# Patient Record
Sex: FEMALE | Race: WHITE | NOT HISPANIC OR LATINO | Employment: PART TIME | URBAN - METROPOLITAN AREA
[De-identification: names, ages, dates, MRNs, and addresses within clinical notes are randomized per-mention and may not be internally consistent; named-entity substitution may affect disease eponyms.]

---

## 2017-01-09 ENCOUNTER — ALLSCRIPTS OFFICE VISIT (OUTPATIENT)
Dept: OTHER | Facility: OTHER | Age: 52
End: 2017-01-09

## 2017-02-01 ENCOUNTER — ALLSCRIPTS OFFICE VISIT (OUTPATIENT)
Dept: OTHER | Facility: OTHER | Age: 52
End: 2017-02-01

## 2017-02-13 ENCOUNTER — GENERIC CONVERSION - ENCOUNTER (OUTPATIENT)
Dept: OTHER | Facility: OTHER | Age: 52
End: 2017-02-13

## 2017-02-14 LAB — GLUCOSE SERPL-MCNC: 100 MG/DL

## 2017-02-20 ENCOUNTER — GENERIC CONVERSION - ENCOUNTER (OUTPATIENT)
Dept: OTHER | Facility: OTHER | Age: 52
End: 2017-02-20

## 2017-03-02 ENCOUNTER — ALLSCRIPTS OFFICE VISIT (OUTPATIENT)
Dept: OTHER | Facility: OTHER | Age: 52
End: 2017-03-02

## 2017-03-02 DIAGNOSIS — N63.0 BREAST LUMP: ICD-10-CM

## 2017-03-10 ENCOUNTER — HOSPITAL ENCOUNTER (OUTPATIENT)
Dept: RADIOLOGY | Facility: HOSPITAL | Age: 52
Discharge: HOME | End: 2017-03-10
Payer: COMMERCIAL

## 2017-03-10 DIAGNOSIS — N63.0 BREAST LUMP: ICD-10-CM

## 2017-03-10 PROCEDURE — 76642 ULTRASOUND BREAST LIMITED: CPT

## 2017-03-10 PROCEDURE — G0204 DX MAMMO INCL CAD BI: HCPCS

## 2017-03-13 ENCOUNTER — GENERIC CONVERSION - ENCOUNTER (OUTPATIENT)
Dept: OTHER | Facility: OTHER | Age: 52
End: 2017-03-13

## 2017-04-18 ENCOUNTER — GENERIC CONVERSION - ENCOUNTER (OUTPATIENT)
Dept: OTHER | Facility: OTHER | Age: 52
End: 2017-04-18

## 2017-04-19 ENCOUNTER — GENERIC CONVERSION - ENCOUNTER (OUTPATIENT)
Dept: OTHER | Facility: OTHER | Age: 52
End: 2017-04-19

## 2017-04-19 LAB
A/G RATIO (HISTORICAL): 1.5 (ref 1.2–2.2)
ALBUMIN SERPL BCP-MCNC: 4.4 G/DL (ref 3.5–5.5)
ALP SERPL-CCNC: 92 IU/L (ref 39–117)
ALT SERPL W P-5'-P-CCNC: 13 IU/L (ref 0–32)
AST SERPL W P-5'-P-CCNC: 15 IU/L (ref 0–40)
BILIRUB SERPL-MCNC: 0.3 MG/DL (ref 0–1.2)
BUN SERPL-MCNC: 18 MG/DL (ref 6–24)
BUN/CREA RATIO (HISTORICAL): 16 (ref 9–23)
CALCIUM SERPL-MCNC: 9.2 MG/DL (ref 8.7–10.2)
CHLORIDE SERPL-SCNC: 103 MMOL/L (ref 96–106)
CO2 SERPL-SCNC: 24 MMOL/L (ref 18–29)
CREAT SERPL-MCNC: 1.13 MG/DL (ref 0.57–1)
EGFR AFRICAN AMERICAN (HISTORICAL): 65 ML/MIN/1.73
EGFR-AMERICAN CALC (HISTORICAL): 56 ML/MIN/1.73
GLUCOSE SERPL-MCNC: 94 MG/DL (ref 65–99)
INTERPRETATION (HISTORICAL): NORMAL
PDF IMAGE (HISTORICAL): NORMAL
POTASSIUM SERPL-SCNC: 4.8 MMOL/L (ref 3.5–5.2)
SODIUM SERPL-SCNC: 141 MMOL/L (ref 134–144)
TOT. GLOBULIN, SERUM (HISTORICAL): 3 G/DL (ref 1.5–4.5)
TOTAL PROTEIN (HISTORICAL): 7.4 G/DL (ref 6–8.5)

## 2017-04-26 ENCOUNTER — GENERIC CONVERSION - ENCOUNTER (OUTPATIENT)
Dept: OTHER | Facility: OTHER | Age: 52
End: 2017-04-26

## 2017-05-05 ENCOUNTER — ALLSCRIPTS OFFICE VISIT (OUTPATIENT)
Dept: OTHER | Facility: OTHER | Age: 52
End: 2017-05-05

## 2017-08-25 ENCOUNTER — GENERIC CONVERSION - ENCOUNTER (OUTPATIENT)
Dept: OTHER | Facility: OTHER | Age: 52
End: 2017-08-25

## 2017-10-13 ENCOUNTER — ALLSCRIPTS OFFICE VISIT (OUTPATIENT)
Dept: OTHER | Facility: OTHER | Age: 52
End: 2017-10-13

## 2017-10-14 NOTE — PROGRESS NOTES
Assessment  1  Painful ankle (719 47) (M25 579)   2  Difficulty walking (719 7) (R26 2)   3  Foot pain, bilateral (729 5) (M79 671,M79 672)    Plan   · Voltaren 1 % Transdermal Gel (Diclofenac Sodium); APPLY TO LOWER  EXTREMITIES, 4 GM OF GEL TO AFFECTED AREA 4 TIMES DAILY  DO  NOT APPLY MORE THAN 16 GM DAILY TO ANY ONE AFFECTED JOINT   · Follow-up PRN Evaluation and Treatment  Follow-up  Status: Complete  Done:  20XQY4847 09:32AM   · Schedule Surgery Treatment  Procedure  Status: Hold For - Scheduling  Requested for:  44IYI6274   · Use a heel insert in both shoes ; Status:Complete;   Done: 51UKG7307 09:32AM   · Wear shoes that give your toes plenty of room ; Status:Complete;   Done: 17MHB0096  09:32AM    Discussion/Summary  The patient was counseled regarding instructions for management,-- prognosis,-- patient and family education,-- risks and benefits of treatment options,-- importance of compliance with treatment  Patient is able to Self-Care  Possible side effects of new medications were reviewed with the patient/guardian today  The treatment plan was reviewed with the patient/guardian  The patient/guardian understands and agrees with the treatment plan      Chief Complaint  FOLLOW UP FROM LAST VISIT      History of Present Illness  HPI: Patient has pain in both feet  Patient is osteoarthritis and psoriatic arthritis  She has a painful right bunion  Her left instep hurts  Review of Systems    Constitutional: No fever, no chills, feels well, no tiredness, no recent weight gain or weight loss  Cardiovascular: No complaints of slow heart rate, no fast heart rate, no chest pain, no palpitations, no leg claudication, no lower extremity edema  Active Problems  1  Allergic rhinitis due to pollen (477 0) (J30 1)   2  Anxiety disorder (300 00) (F41 9)   3  Benign essential hypertension (401 1) (I10)   4  BMI 35 0-35 9,adult (V85 35) (Z68 35)   5   Breast lump on right side at 10 o'clock position (611 72) (N63 11)   6  Colon, diverticulosis (562 10) (K57 30)   7  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)   8  Impaired fasting glucose (790 21) (R73 01)   9  Irritable bowel syndrome (564 1) (K58 9)   10  Obesity (278 00) (E66 9)   11  Painful ankle (719 47) (M25 579)   12  Visit for screening mammogram (V76 12) (Z12 31)    Past Medical History   · History of Acute conjunctivitis of right eye (372 00) (H10 31)   · History of Acute maxillary sinusitis (461 0) (J01 00)   · History of Acute maxillary sinusitis, recurrence not specified (461 0) (J01 00)   · History of Acute URI (465 9) (J06 9)   · History of Herniated Intervertebral Disc (722 2)   · History of acute bronchitis (V12 69) (Z87 09)   · History of acute pharyngitis (V12 69) (Z87 09)   · History of dermatitis (V13 3) (Z87 2)   · Influenza vaccine needed (V04 81) (Z23)   · History of Otitis media, unspecified laterality   · History of Pre-op exam (V72 84) (Z01 818)   · History of Screening for diabetes mellitus (DM) (V77 1) (Z13 1)   · History of Urgency of urination (788 63) (R39 15)    Surgical History   · History of Appendectomy   · History of  Section   · History of Exostosectomy Foot For Spur   · History of Knee Surgery   · History of Tubal Ligation    Family History  Mother    · Family history of Diabetes Mellitus (V18 0)   · Family history of Heart Disease (V17 49)  Father    · Family history of Atrial Fibrillation    Social History   · Never a smoker    Current Meds   1  Aspirin Low Dose 81 MG TABS; TAKE 1 TABLET DAILY; Therapy: 17YAT2766 to (Evaluate:2013); Last Rx:2013 Ordered   2  ClonazePAM 0 5 MG Oral Tablet; 1/2 po Q6 hours prn anxiety; Therapy: 86SYC2862 to (Last Rx:51Nee5540)  Requested for: 2016 Ordered   3  Metaxalone 800 MG Oral Tablet; TAKE 1 TABLET 3 TIMES DAILY AS NEEDED; Therapy: 79HFI1828 to (Evaluate:93Jzu4190)  Requested for: 45FEJ0616; Last   Rx:2017 Ordered   4   Omeprazole 20 MG Oral Capsule Delayed Release; Therapy: 39EGP9410 to (Evaluate:24Ssz2716) Recorded   5  Sertraline HCl - 100 MG Oral Tablet; take 1 tablet by mouth once daily; Therapy: 03WXN1896 to (03 17 74 30 53)  Requested for: 97DYV9434; Last   Rx:28Oct2016 Ordered   6  Verapamil HCl  MG Oral Capsule Extended Release 24 Hour; take 1 capsule by   mouth once daily; Therapy: 28XZA1476 to (03 17 74 30 53)  Requested for: 70LXN3235; Last   Rx:28Oct2016 Ordered   7  VESIcare 10 MG Oral Tablet; TAKE 1 TABLET Bedtime; Therapy: 43JDT5692 to (03 17 74 30 53)  Requested for: 67LXK4405; Last   Rx:28Oct2016 Ordered    Allergies  1  Penicillins  2  Bee sting    Vitals   Recorded: 08ORS6283 56:42LX   Systolic 087   Diastolic 96   Height 5 ft 5 5 in   Weight 218 lb    BMI Calculated 35 73   BSA Calculated 2 06     Physical Exam  Left Foot: Appearance: Normal except as noted:   Tenderness: None except the proximal first metatarsal,-- Heel cuneiform,-- extensor digitorum tendon,-- flexor hallucis longus tendon,-- lisfranc joint-- and-- medial longitudinal arch  ROM: subtalar motion was restricted  Right Foot: Appearance: Normal except as noted: pes planus  Great toe deformities include a bunion  Hallux rigidus noted, with dorsal spurring of the first metatarsophalangeal joint  Tenderness: None except the great toe,-- distal first metatarsal-- and-- medial forefoot  Left Ankle: ROM: limited ROM in all planes   Right Ankle: ROM: limited ROM in all planes   Neurological Exam: performed  Light touch was intact bilaterally  Vibratory sensation was intact bilaterally  Response to monofilament test was intact bilaterally  Deep tendon reflexes: patellar reflex present bilaterally-- and-- achilles reflex present bilaterally  Vascular Exam: performed Dorsalis pedis pulses were present bilaterally  Posterior tibial pulses were present bilaterally  Elevation Pallor: absent bilaterally  Dependence rubor was absent bilaterally   Edema: severe bilaterally-- and-- 5/7 pitting edema bilateral  Negative Homans sign  Toenails: All of the toenails were elongated  Hyperkeratosis: none  Shoe Gear Evaluation: performed ()  Recommendation(s): custom inlays  Results/Data  I personally reviewed the films/images/results in the office today  My interpretation follows  X-ray Review 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 noted  Osteoarthritis Lisfranc joint bilateral noted as well  Procedure  Patient will do as directed and wear the orthotics daily  Trigger point injection was done into the left foot  Through a dorsal approach in the area of the first metatarsophalangeal joint, 1 cc of Kenalog-10 was injected without complication  Patient is been referred back to rheumatology   We will add Voltaren gel      Future Appointments    Date/Time Provider Specialty Site   11/07/2017 02:45 PM Marli Tian, [address] [place] [address]     Signatures   Electronically signed by : Oziel Vicente DPM; Oct 13 2017  9:32AM EST                       (Author)

## 2017-10-18 ENCOUNTER — GENERIC CONVERSION - ENCOUNTER (OUTPATIENT)
Dept: OTHER | Facility: OTHER | Age: 52
End: 2017-10-18

## 2017-10-18 LAB
A/G RATIO (HISTORICAL): 1.3 (ref 1.2–2.2)
ALBUMIN SERPL BCP-MCNC: 4.3 G/DL (ref 3.5–5.5)
ALP SERPL-CCNC: 100 IU/L (ref 39–117)
ALT SERPL W P-5'-P-CCNC: 12 IU/L (ref 0–32)
AST SERPL W P-5'-P-CCNC: 14 IU/L (ref 0–40)
BILIRUB SERPL-MCNC: 0.4 MG/DL (ref 0–1.2)
BUN SERPL-MCNC: 20 MG/DL (ref 6–24)
BUN/CREA RATIO (HISTORICAL): 20 (ref 9–23)
CALCIUM SERPL-MCNC: 9.4 MG/DL (ref 8.7–10.2)
CHLORIDE SERPL-SCNC: 99 MMOL/L (ref 96–106)
CHOLEST SERPL-MCNC: 169 MG/DL (ref 100–199)
CO2 SERPL-SCNC: 23 MMOL/L (ref 18–29)
CREAT SERPL-MCNC: 1.01 MG/DL (ref 0.57–1)
EGFR AFRICAN AMERICAN (HISTORICAL): 74 ML/MIN/1.73
EGFR-AMERICAN CALC (HISTORICAL): 64 ML/MIN/1.73
GLUCOSE SERPL-MCNC: 86 MG/DL (ref 65–99)
HDLC SERPL-MCNC: 51 MG/DL
LDLC SERPL CALC-MCNC: 85 MG/DL (ref 0–99)
POTASSIUM SERPL-SCNC: 4.6 MMOL/L (ref 3.5–5.2)
SODIUM SERPL-SCNC: 137 MMOL/L (ref 134–144)
TOT. GLOBULIN, SERUM (HISTORICAL): 3.2 G/DL (ref 1.5–4.5)
TOTAL PROTEIN (HISTORICAL): 7.5 G/DL (ref 6–8.5)
TRIGL SERPL-MCNC: 166 MG/DL (ref 0–149)

## 2017-10-19 ENCOUNTER — GENERIC CONVERSION - ENCOUNTER (OUTPATIENT)
Dept: OTHER | Facility: OTHER | Age: 52
End: 2017-10-19

## 2017-10-19 LAB
INTERPRETATION (HISTORICAL): NORMAL
TSH SERPL DL<=0.05 MIU/L-ACNC: 3.33 UIU/ML (ref 0.45–4.5)

## 2017-10-23 ENCOUNTER — ALLSCRIPTS OFFICE VISIT (OUTPATIENT)
Dept: OTHER | Facility: OTHER | Age: 52
End: 2017-10-23

## 2017-10-26 ENCOUNTER — ALLSCRIPTS OFFICE VISIT (OUTPATIENT)
Dept: OTHER | Facility: OTHER | Age: 52
End: 2017-10-26

## 2017-10-27 NOTE — PROGRESS NOTES
Assessment  1  Other acute sinusitis (461 8) (J01 80)    Plan  Other acute sinusitis    · Azithromycin 250 MG Oral Tablet; TAKE 2 TABLETS ON DAY 1 THEN TAKE 1  TABLET A DAY FOR 4 DAYS   · MethylPREDNISolone 4 MG Oral Tablet Therapy Pack; as directed    Chief Complaint  head and chest congestion harsh occasional productive cough sinus pressure achy at times rmklpn      History of Present Illness  pt states she was here monday with congestion achyness cough  pt states she also had pink eye  pink eye was treatedstates her symptoms are getting worsefever no chills       Review of Systems    Constitutional: feeling poorly, but-- as noted in HPI  Eyes: No complaints of eye pain, no red eyes, no eyesight problems, no discharge, no dry eyes, no itching of eyes  ENT: nasal discharge-- and-- hoarseness, but-- as noted in HPI  Cardiovascular: No complaints of slow heart rate, no fast heart rate, no chest pain, no palpitations, no leg claudication, no lower extremity edema  Respiratory: cough, but-- as noted in HPI  Active Problems  1  Acute conjunctivitis of both eyes (372 00) (H10 33)   2  Adult BMI 36 0-36 9 kg/sq m (V85 36) (Z68 36)   3  Allergic rhinitis due to pollen (477 0) (J30 1)   4  Anxiety disorder (300 00) (F41 9)   5  Benign essential hypertension (401 1) (I10)   6  Breast lump on right side at 10 o'clock position (611 72) (N63 11)   7  Colon, diverticulosis (562 10) (K57 30)   8  Difficulty walking (719 7) (R26 2)   9  Foot pain, bilateral (729 5) (M79 671,M79 672)   10  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)   11  Impaired fasting glucose (790 21) (R73 01)   12  Irritable bowel syndrome (564 1) (K58 9)   13  Obesity (278 00) (E66 9)   14  Painful ankle (719 47) (M25 579)   15  Visit for screening mammogram (V76 12) (Z12 31)    Past Medical History  1  History of Acute conjunctivitis of right eye (372 00) (H10 31)   2  History of Acute maxillary sinusitis (461 0) (J01 00)   3   History of Acute maxillary sinusitis, recurrence not specified (461 0) (J01 00)   4  History of Acute URI (465 9) (J06 9)   5  History of Herniated Intervertebral Disc (722 2)   6  History of acute bronchitis (V12 69) (Z87 09)   7  History of acute pharyngitis (V12 69) (Z87 09)   8  History of dermatitis (V13 3) (Z87 2)   9  Influenza vaccine needed (V04 81) (Z23)   10  History of Otitis media, unspecified laterality   11  History of Pre-op exam (V72 84) (Z01 818)   12  History of Screening for diabetes mellitus (DM) (V77 1) (Z13 1)   13  History of Urgency of urination (788 63) (R39 15)    The active problems and past medical history were reviewed and updated today  Surgical History  1  History of Appendectomy   2  History of  Section   3  History of Exostosectomy Foot For Spur   4  History of Knee Surgery   5  History of Tubal Ligation    The surgical history was reviewed and updated today  Family History  Mother    1  Family history of Diabetes Mellitus (V18 0)   2  Family history of Heart Disease (V17 49)  Father    3  Family history of Atrial Fibrillation    The family history was reviewed and updated today  Social History   · Never a smoker  The social history was reviewed and updated today  Current Meds   1  Aspirin Low Dose 81 MG TABS; TAKE 1 TABLET DAILY; Therapy: 43YCR3784 to (Evaluate:2013); Last Rx:2013 Ordered   2  ClonazePAM 0 5 MG Oral Tablet; 1/2 po Q6 hours prn anxiety; Therapy: 19KFM4231 to (Last Rx:09Dwv2023)  Requested for: 2016 Ordered   3  Metaxalone 800 MG Oral Tablet; TAKE 1 TABLET 3 TIMES DAILY AS NEEDED; Therapy: 59WBY1939 to (Evaluate:71Vlq8926)  Requested for: 83OMB2229; Last   Rx:75Buh5937 Ordered   4  Omeprazole 20 MG Oral Capsule Delayed Release; Therapy: 79AGA5745 to (Evaluate:04Qva2578) Recorded   5  Sertraline HCl - 100 MG Oral Tablet; take 1 tablet by mouth once daily;    Therapy: 88QCZ7287 to ([phone])  Requested for: 52AMK7193; Last Rx: 28Oct2016 Ordered   6  Verapamil HCl  MG Oral Capsule Extended Release 24 Hour; take 1 capsule by   mouth once daily; Therapy: 52LEN5381 to [phone])  Requested for: 60NIU6971; Last   Rx:28Oct2016 Ordered   7  VESIcare 10 MG Oral Tablet; TAKE 1 TABLET Bedtime; Therapy: 91FNF9860 to [phone])  Requested for: 96VPU8861; Last   Rx:28Oct2016 Ordered   8  Voltaren 1 % Transdermal Gel; APPLY TO LOWER EXTREMITIES, 4 GM OF GEL TO   AFFECTED AREA 4 TIMES DAILY  DO NOT APPLY MORE THAN 16 GM DAILY TO ANY   ONE AFFECTED JOINT; Therapy: 38VCB6944 to (Evaluate:63Rlw2014)  Requested for: 49UDC6524; Last   Rx:13Oct2017 Ordered    The medication list was reviewed and updated today  Allergies  1  Penicillins  2  Bee sting    Vitals  Vital Signs    Recorded: 46KCL6748 03:56PM   Temperature 97 2 F   Heart Rate 76   Respiration 18   Systolic 869   Diastolic 72   Height 5 ft 5 5 in   Weight 221 lb    BMI Calculated 36 22   BSA Calculated 2 08     Physical Exam    Constitutional   General appearance: No acute distress, well appearing and well nourished  Eyes   Conjunctiva and lids: No swelling, erythema or discharge  Pupils and irises: Equal, round and reactive to light  Ears, Nose, Mouth, and Throat   Otoscopic examination: Tympanic membranes translucent with normal light reflex  Canals patent without erythema  Nasal mucosa, septum, and turbinates: Abnormal   The bilateral nasal mucosa was red  Oropharynx: Abnormal   The posterior pharynx was erythematous  Pulmonary   Auscultation of lungs: Clear to auscultation  Cardiovascular   Auscultation of heart: Normal rate and rhythm, normal S1 and S2, without murmurs  Lymphatic   Palpation of lymph nodes in neck: No lymphadenopathy           Future Appointments    Date/Time Provider Specialty Site   11/10/2017 03:00 PM Penelope Kahn DO Family Medicine [facility]     Signatures   Electronically signed by : Nicole Doherty DO; Oct 26 2017  4:38PM EST                       (Author)

## 2017-11-20 ENCOUNTER — GENERIC CONVERSION - ENCOUNTER (OUTPATIENT)
Dept: OTHER | Facility: OTHER | Age: 52
End: 2017-11-20

## 2018-01-10 NOTE — RESULT NOTES
Verified Results  (1) COMPREHENSIVE METABOLIC PANEL [id number] 89:52BI Aaron Dowen     Test Name Result Flag Reference   Glucose, Serum 94 mg/dL  65-99   BUN 18 mg/dL  6-24   Creatinine, Serum 1 13 mg/dL H 0 57-1 00   BUN/Creatinine Ratio 16  9-23   Sodium, Serum 141 mmol/L  134-144   Potassium, Serum 4 8 mmol/L  3 5-5 2   Chloride, Serum 103 mmol/L     Carbon Dioxide, Total 24 mmol/L  18-29   Calcium, Serum 9 2 mg/dL  8 7-10 2   Protein, Total, Serum 7 4 g/dL  6 0-8 5   Albumin, Serum 4 4 g/dL  3 5-5 5   Globulin, Total 3 0 g/dL  1 5-4 5   A/G Ratio 1 5  1 2-2 2   Bilirubin, Total 0 3 mg/dL  0 0-1 2   Alkaline Phosphatase, S 92 IU/L     AST (SGOT) 15 IU/L  0-40   ALT (SGPT) 13 IU/L  0-32   eGFR If NonAfricn Am 56 mL/min/1 73 L >59   eGFR If Africn Am 65 mL/min/1 73  >59       Signatures   Electronically signed by : Anders Cole;  Apr 26 2017  2:15PM EST                       (Author)

## 2018-01-11 NOTE — RESULT NOTES
Message   Appointment 10/28/16   Please print and put in my folder  Dr Ivan Rowland      Verified Results  (1) COMPREHENSIVE METABOLIC PANEL [id number] 72:14LO Dale Moore     Test Name Result Flag Reference   Glucose, Serum 96 mg/dL  65-99   BUN 11 mg/dL  6-24   Creatinine, Serum 1 03 mg/dL H 0 57-1 00   eGFR If NonAfricn Am 63 mL/min/1 73  >59   eGFR If Africn Am 73 mL/min/1 73  >59   BUN/Creatinine Ratio 11  9-23   Sodium, Serum 141 mmol/L  136-144   **Please note reference interval change**   Potassium, Serum 4 5 mmol/L  3 5-5 2   **Please note reference interval change**   Chloride, Serum 102 mmol/L     **Please note reference interval change**   Carbon Dioxide, Total 23 mmol/L  18-29   Calcium, Serum 9 2 mg/dL  8 7-10 2   Protein, Total, Serum 7 2 g/dL  6 0-8 5   Albumin, Serum 4 3 g/dL  3 5-5 5   Globulin, Total 2 9 g/dL  1 5-4 5   A/G Ratio 1 5  1 1-2 5   Bilirubin, Total 0 3 mg/dL  0 0-1 2   Alkaline Phosphatase, S 80 IU/L     AST (SGOT) 17 IU/L  0-40   ALT (SGPT) 12 IU/L  0-32     (1) LIPID PANEL FASTING W DIRECT LDL REFLEX 19Oct2016 07:39AM Dale Moore     Test Name Result Flag Reference   Cholesterol, Total 171 mg/dL  100-199   Triglycerides 93 mg/dL  0-149   HDL Cholesterol 54 mg/dL  >39   According to ATP-III Guidelines, HDL-C >59 mg/dL is considered a  negative risk factor for CHD  LDL Cholesterol Calc 98 mg/dL  0-99     (1) HEMOGLOBIN A1C 19Oct2016 07:39AM Dale Moore     Test Name Result Flag Reference   Hemoglobin A1c 5 5 %  4 8-5 6   Pre-diabetes: 5 7 - 6 4           Diabetes: >6 4           Glycemic control for adults with diabetes: <7 0     [facility]) Cardiovascular Risk Assessment 19Oct2016 07:39AM Dale Moore     Test Name Result Flag Reference   Interpretation Note     Supplement report is available  PDF Image

## 2018-01-12 NOTE — PROGRESS NOTES
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)   8  Impaired fasting glucose (790 21) (R73 01)   9  Irritable bowel syndrome (564 1) (K58 9)   10  Obesity (278 00) (E66 9)   11  Pre-op exam (V72 84) (Z01 818)   12  Visit for screening mammogram (V76 12) (Z12 31)    Current Meds   1  Aspirin Low Dose 81 MG TABS; TAKE 1 TABLET DAILY; Therapy: 69HXW2700 to (Evaluate:17Jun2013); Last Rx:19Mar2013 Ordered   2  ClonazePAM 0 5 MG Oral Tablet; 1/2 po Q6 hours prn anxiety; Therapy: 53COQ8045 to (Last Rx:64Tcn3155)  Requested for: 33Mhi9289 Ordered   3  Omeprazole 20 MG Oral Capsule Delayed Release; Therapy: 20IMY5654 to (Evaluate:83Cqj5435) Recorded   4  Sertraline HCl - 100 MG Oral Tablet; take 1 tablet by mouth once daily; Therapy: 34FOX9599 to (797 8650)  Requested for: 56Tlo6679; Last   Rx:20Moq5769 Ordered   5  Verapamil HCl  MG Oral Capsule Extended Release 24 Hour; take 1 capsule by   mouth once daily; Therapy: 98PQN3528 to (Maxine Castaneda)  Requested for: 30Dec0749; Last   Rx:73Vcb8986 Ordered   6  VESIcare 10 MG Oral Tablet; TAKE 1 TABLET Bedtime; Therapy: 48EQF0494 to (Evaluate:68Lzl7199)  Requested for: 15Gjf3296; Last   Rx:96Okw1308 Ordered    Allergies    1  Penicillins    2   Bee sting    Plan  Health Maintenance    · PPD    Future Appointments    Date/Time Provider Specialty Site   10/28/2016 02:15 PM Mayelin Cabezas[address]   09/29/2016 03:00 PM [employer]     Signatures   Electronically signed by : Emilia Ruiz DO; Sep 27 2016  9:45PM EST                       (Review)

## 2018-01-13 VITALS
SYSTOLIC BLOOD PRESSURE: 122 MMHG | BODY MASS INDEX: 35.2 KG/M2 | HEIGHT: 66 IN | WEIGHT: 219 LBS | HEART RATE: 76 BPM | DIASTOLIC BLOOD PRESSURE: 76 MMHG | RESPIRATION RATE: 16 BRPM | TEMPERATURE: 98 F

## 2018-01-13 VITALS
DIASTOLIC BLOOD PRESSURE: 96 MMHG | WEIGHT: 218 LBS | HEIGHT: 66 IN | BODY MASS INDEX: 35.03 KG/M2 | SYSTOLIC BLOOD PRESSURE: 124 MMHG

## 2018-01-13 NOTE — RESULT NOTES
Discussion/Summary   Appointment 11/7/17   Please print and put in my folder  Dr Rupa Cope      Verified Results  (1) COMPREHENSIVE METABOLIC PANEL [id number] 06:10LP [employer]     Test Name Result Flag Reference   Glucose, Serum 86 mg/dL  65-99   BUN 20 mg/dL  6-24   Creatinine, Serum 1 01 mg/dL H 0 57-1 00   BUN/Creatinine Ratio 20  9-23   Sodium, Serum 137 mmol/L  134-144   Potassium, Serum 4 6 mmol/L  3 5-5 2   Chloride, Serum 99 mmol/L     Carbon Dioxide, Total 23 mmol/L  18-29   Calcium, Serum 9 4 mg/dL  8 7-10 2   Protein, Total, Serum 7 5 g/dL  6 0-8 5   Albumin, Serum 4 3 g/dL  3 5-5 5   Globulin, Total 3 2 g/dL  1 5-4 5   A/G Ratio 1 3  1 2-2 2   Bilirubin, Total 0 4 mg/dL  0 0-1 2   Alkaline Phosphatase, S 100 IU/L     AST (SGOT) 14 IU/L  0-40   ALT (SGPT) 12 IU/L  0-32   eGFR If NonAfricn Am 64 mL/min/1 73  >59   eGFR If Africn Am 74 mL/min/1 73  >59     (1) LIPID PANEL FASTING W DIRECT LDL REFLEX 18Oct2017 09:07AM [employer]     Test Name Result Flag Reference   Cholesterol, Total 169 mg/dL  100-199   Triglycerides 166 mg/dL H 0-149   HDL Cholesterol 51 mg/dL  >39   LDL Cholesterol Calc 85 mg/dL  0-99     (1) TSH 18Oct2017 09:07AM [employer]     Test Name Result Flag Reference   TSH 3 330 uIU/mL  0 450-4 500     [facility]) Cardiovascular Risk Assessment 11GIN0372 09:07AM [employer]     Test Name Result Flag Reference   Interpretation Note     Supplement report is available  PDF Image

## 2018-01-13 NOTE — RESULT NOTES
Verified Results  *US BREAST RIGHT LIMITED (DIAGNOSTIC) [id number] 01:20PM Rosangela Chauhan Order Number: [id number]    - Patient Instructions: To schedule this appointment, please contact Central Scheduling at [phone]  Test Name Result Flag Reference   US BREAST RIGHT LIMITED (Report)     Patient History:   Family history of breast cancer at age 61 in maternal    grandmother  Patient's BMI is 33 7  Reason for exam: clinical finding  Mammo Diagnostic Bilateral W CAD: March 10, 2017 - Check In #:    [de-identified]   Bilateral MLO and CC view(s) were taken  XCCL view(s) were taken   of the right breast      Technologist: MOLLY Bustamante   Prior study comparison: July 28, 2016, mammo diagnostic right W    CAD performed at [place] [address]  July 19, 2016, mammo screening bilateral W CAD performed at [place] [address]  July 27, 2015, left breast diagnostic    mammogram performed at [place] [address]  July 16, 2015, left breast screening mammogram performed at [place] [address]  July 15, 2014, bilateral    screening mammogram performed at [place] [address]  July 11, 2013, bilateral screening mammogram performed    at [place] [address]  June 20, 2012, left    breast diagnostic mammogram performed at [place] [address]  There are scattered fibroglandular densities  There are no    suspicious mass lesions, areas of architectural distortion, or    pleomorphic microcalcifications is either breast to suggest    malignancy  Within the right axillary tail at the site of    palpable concern, 2 adjacent benign-appearing lymph nodes are    identified, stable in size and morphology  Targeted ultrasound    performed the same date confirms this impression  US Breast Right Limited: March 10, 2017 - Check In #: [de-identified]   Standard views       Technologist: Rhett Monroy F  Stas, BS RDMS RVT RDCS   A uniformly echogenic layer of fibroglandular tissue  Targeted    ultrasound of the right axillary tail at the site of palpable    concern confirms 2 adjacent benign fatty-replaced lymph nodes    measuring 1 4 x 0 9 x 1 9 cm and 0 6 x 0 3 x 0 9 cm,    respectively  Each node is reniform in shape with an echogenic    fatty hilum and a thin hypoechoic cortex  No suspicious solid or   cystic mass lesions, areas of architectural distortion, or    abnormal acoustic shadowing in the upper outer breast to suggest    malignancy  Benign right intramammary lymph nodes  ACR BI-RADSï¾® Assessments: BiRad:2 - Benign (Overall)   Diag Mammogram: BiRad:2 - benign finding in the right breast    Right breast Right Brst US: BiRad:2 - benign finding in the left    breast      Recommendation:   Clinical management of the right breast    Routine screening mammogram of both breasts in 1 year  Analyzed by CAD     Transcription Location: [facility] 98: [id number]     Risk Value(s):   Tyrer-Cuzick 10 Year: 4 200%, Tyrer-Cuzick Lifetime: 15 700%,    Myriad Table: 1 5%, SHERI 5 Year: 0 9%, NCI Lifetime: 7 8%   Signed by:   Clayton Valero MD   3/10/17     MAMMO DIAGNOSTIC BILATERAL W CAD [id number] 01:19PM China Cruzvalery Order Number: [id number]    - Patient Instructions: To schedule this appointment, please contact Central Scheduling at [phone]  Do not wear any perfume, powder, lotion or deodorant on breast or underarm area  Please bring your doctors order, referral (if needed) and insurance information with you on the day of the test  Failure to bring this information may result in this test being rescheduled  Arrive 15 minutes prior to your appointment time to register  On the day of your test, please bring any prior mammogram or breast studies with you that were not performed at a [facility]   Failure to bring prior exams may result in your test needing to be rescheduled  Test Name Result Flag Reference   MAMMO DIAGNOSTIC BILATERAL W CAD (Report)     Patient History:   Family history of breast cancer at age 61 in maternal    grandmother  Patient's BMI is 33 7  Reason for exam: clinical finding  Mammo Diagnostic Bilateral W CAD: March 10, 2017 - Check In #:    [de-identified]   Bilateral MLO and CC view(s) were taken  XCCL view(s) were taken   of the right breast      Technologist: MOLLY Novak   Prior study comparison: July 28, 2016, mammo diagnostic right W    CAD performed at [facility]  July 19, 2016, mammo screening bilateral W CAD performed at [facility]  July 27, 2015, left breast diagnostic    mammogram performed at [facility]  July 16, 2015, left breast screening mammogram performed at [facility]  July 15, 2014, bilateral    screening mammogram performed at [facility]  July 11, 2013, bilateral screening mammogram performed    at [facility]  June 20, 2012, left    breast diagnostic mammogram performed at [facility]  There are scattered fibroglandular densities  There are no    suspicious mass lesions, areas of architectural distortion, or    pleomorphic microcalcifications is either breast to suggest    malignancy  Within the right axillary tail at the site of    palpable concern, 2 adjacent benign-appearing lymph nodes are    identified, stable in size and morphology  Targeted ultrasound    performed the same date confirms this impression  US Breast Right Limited: March 10, 2017 - Check In #: [de-identified]   Standard views  Technologist: MODE Yadav RDMS RVT RDCS   A uniformly echogenic layer of fibroglandular tissue   Targeted    ultrasound of the right axillary tail at the site of palpable    concern confirms 2 adjacent benign fatty-replaced lymph nodes    measuring 1 4 x 0 9 x 1 9 cm and 0 6 x 0 3 x 0 9 cm,    respectively  Each node is reniform in shape with an echogenic    fatty hilum and a thin hypoechoic cortex  No suspicious solid or   cystic mass lesions, areas of architectural distortion, or    abnormal acoustic shadowing in the upper outer breast to suggest    malignancy  Benign right intramammary lymph nodes  ACR BI-RADSï¾® Assessments: BiRad:2 - Benign (Overall)   Diag Mammogram: BiRad:2 - benign finding in the right breast    Right breast Right Brst US: BiRad:2 - benign finding in the left    breast      Recommendation:   Clinical management of the right breast    Routine screening mammogram of both breasts in 1 year  Analyzed by CAD     Transcription Location: [facility] 98: [id number]     Risk Value(s):   Tyrer-Cuzick 10 Year: 4 200%, Tyrer-Cuzick Lifetime: 15 700%,    Myriad Table: 1 5%, SHERI 5 Year: 0 9%, NCI Lifetime: 7 8%   Signed by:   Li Barron MD   3/10/17       Discussion/Summary   Borden,   Your breast ultrasound and mammogram are normal   If you still feel the lump in your armpit please follow up in the office     Dr Ac Alfaro

## 2018-01-13 NOTE — RESULT NOTES
Discussion/Summary   Appointment 5/4/17   Please print and put in my folder  Dr Kelby Najjar      Verified Results  (1) HEMOGLOBIN A1C 18Apr2017 07:19AM [facility]     Test Name Result Flag Reference   Hemoglobin A1c 5 5 %  4 8-5 6   Pre-diabetes: 5 7 - 6 4           Diabetes: >6 4           Glycemic control for adults with diabetes: <7 0     () [facility] CKD Program [id number] 07:19AM [facility]     Test Name Result Flag Reference   Interpretation Note     -------------------------------  CHRONIC KIDNEY DISEASE:  EGFR, BLOOD PRESSURE, AND PROTEINURIA ASSESSMENT  Patient's eGFR was previously outside the scope of the  program and now is 64 mL/min/1 73mE2 corresponding to CKD  stage 3a  Multiply eGFR by 1 159 if patient is   American  The regression of eGFR with time is not  statistically significant  Current eGFR is 56 mL/min/1 73mE2  corresponding to CKD stage 3a  Potassium is within goal and  has not changed significantly, was 4 5 and now is 4 8  mmol/L  Hemoglobin A1C is 5 5 %; diabetic status is unknown  Refer to ADA guidelines for clinical practice  recommendations  EGFR, BLOOD PRESSURE, AND PROTEINURIA TREATMENT SUGGESTIONS  -  Guidelines recommend a target blood pressure of 140/90 mmHg  or less in CKD patients to reduce cardiovascular risk and  CKD progression  Assessment of albuminuria (urine  albumin:creatinine ratio or urine protein:creatinine ratio  preferred) is recommended at least annually in CKD patients  for staging and disease prognosis  EGFR, BLOOD PRESSURE, AND PROTEINURIA FOLLOW-UP  -  fasting Renal Panel within 12 months; Spot Urine Panel is  recommended by KDOQI guidelines, at least yearly;  -  BONE and MINERAL ASSESSMENT  Calcium is within goal and has not changed significantly,  was 9 2 and now is 9 2 mg/dL   Carbon Dioxide is within goal  and has not changed significantly, was 23 and now is 24  mmol/L  KDOQI guidelines recommend the measurement of  25-hydroxy vitamin D in patients with CKD  BONE and MINERAL TREATMENT SUGGESTIONS  -  Interpretations require simultaneous measurements of serum  calcium and phosphorus  BONE and MINERAL FOLLOW-UP  -  fasting PTH with Renal Panel and 25-Hydroxy Vitamin D are  recommended by KDOQI guidelines, at least yearly;  -  LIPIDS ASSESSMENT  Most recent order does not include a fasting Lipid Panel  LIPIDS FOLLOW-UP  -  fasting Lipid Panel within 6 months;  -  ANEMIA ASSESSMENT  Most recent order does not include a CBC Panel or iron  studies  ANEMIA FOLLOW-UP  -  CBC is recommended by KDOQI guidelines, at least yearly;  -------------------------------  DISCLAIMER  These assessments and treatment suggestions are provided as  a convenience in support of the physician-patient  relationship and are not intended to replace the physician's  clinical judgment  They are derived from the national  guidelines in addition to other evidence and expert opinion  The clinician should consider this information within the  context of clinical opinion and the individual patient  SEE GUIDANCE FOR CHRONIC KIDNEY DISEASE PROGRAM: National  Kidney Foundation Kidney Disease Outcomes Quality Initiative  (KDOQI (TM)), with its limitations and disclaimers, are at  www kidney  org/professionals/KDOQI  Kidney Disease Improving  Global Outcomes (KDIGO) clinical practice guidelines are at  http://kdigo  org/home/guidelines/  The members of  Gerald Perez national advisory panel are listed at  www  [employer] com  This program is intended for patients who  have been diagnosed with stages 3, 4, or pre-dialysis 5 CKD  It is not intended for children, pregnant patients, or  transplant patients  PDF Image

## 2018-01-13 NOTE — MISCELLANEOUS
Message  Return to work or school:   Fabio Barba is under my professional care  She was seen in my office on 10/23/2017   She is able to return to work on  10/25/2017       GABBY Aguilar        Future Appointments    Signatures   Electronically signed by : ASA Aguilar; Oct 23 2017  3:45PM EST                       (Author)    Electronically signed by : Xavi Rees DO; Oct 23 2017  4:56PM EST                       (Review)

## 2018-01-14 VITALS
RESPIRATION RATE: 18 BRPM | BODY MASS INDEX: 35.52 KG/M2 | HEIGHT: 66 IN | HEART RATE: 76 BPM | DIASTOLIC BLOOD PRESSURE: 72 MMHG | WEIGHT: 221 LBS | TEMPERATURE: 97.2 F | SYSTOLIC BLOOD PRESSURE: 110 MMHG

## 2018-01-14 VITALS
HEIGHT: 66 IN | RESPIRATION RATE: 16 BRPM | SYSTOLIC BLOOD PRESSURE: 120 MMHG | BODY MASS INDEX: 35.2 KG/M2 | DIASTOLIC BLOOD PRESSURE: 82 MMHG | TEMPERATURE: 98.5 F | HEART RATE: 80 BPM | WEIGHT: 219 LBS

## 2018-01-14 VITALS
HEIGHT: 66 IN | WEIGHT: 220 LBS | DIASTOLIC BLOOD PRESSURE: 72 MMHG | HEART RATE: 82 BPM | RESPIRATION RATE: 20 BRPM | SYSTOLIC BLOOD PRESSURE: 118 MMHG | BODY MASS INDEX: 35.36 KG/M2 | TEMPERATURE: 97.8 F

## 2018-01-15 VITALS
SYSTOLIC BLOOD PRESSURE: 106 MMHG | BODY MASS INDEX: 35.03 KG/M2 | HEIGHT: 66 IN | RESPIRATION RATE: 16 BRPM | HEART RATE: 84 BPM | DIASTOLIC BLOOD PRESSURE: 64 MMHG | WEIGHT: 218 LBS | TEMPERATURE: 98.7 F

## 2018-01-15 NOTE — RESULT NOTES
Message   Appointment 4/26/16   Please print and put in my folder   Dr Corey Cisneros     Verified Results  [facility]) Lipid Panel With LDL/HDL Ratio 04Apr2016 09:02AM Julieta New     Test Name Result Flag Reference   Cholesterol, Total 192 mg/dL  100-199   Triglycerides 121 mg/dL  0-149   HDL Cholesterol 51 mg/dL  >39   According to ATP-III Guidelines, HDL-C >59 mg/dL is considered a  negative risk factor for CHD  VLDL Cholesterol Dennys 24 mg/dL  5-40   LDL Cholesterol Calc 117 mg/dL H 0-99   LDL/HDL Ratio 2 3 ratio units  0 0-3 2   LDL/HDL Ratio                                                             Men  Women                                               1/2 Avg  Risk  1 0    1 5                                                   Avg Risk  3 6    3 2                                                2X Avg  Risk  6 2    5 0                                                3X Avg  Risk  8 0    6 1     (1) COMPREHENSIVE METABOLIC PANEL [id number] 79:79LT Julieta New     Test Name Result Flag Reference   Glucose, Serum 101 mg/dL H 65-99   BUN 17 mg/dL  6-24   Creatinine, Serum 1 03 mg/dL H 0 57-1 00   eGFR If NonAfricn Am 63 mL/min/1 73  >59   eGFR If Africn Am 73 mL/min/1 73  >59   BUN/Creatinine Ratio 17  9-23   Sodium, Serum 135 mmol/L  134-144   Potassium, Serum 4 5 mmol/L  3 5-5 2   Chloride, Serum 102 mmol/L     Carbon Dioxide, Total 20 mmol/L  18-29   Calcium, Serum 9 0 mg/dL  8 7-10 2   Protein, Total, Serum 7 4 g/dL  6 0-8 5   Albumin, Serum 4 3 g/dL  3 5-5 5   Globulin, Total 3 1 g/dL  1 5-4 5   A/G Ratio 1 4  1 1-2 5   Bilirubin, Total 0 3 mg/dL  0 0-1 2   Alkaline Phosphatase, S 85 IU/L     AST (SGOT) 17 IU/L  0-40   ALT (SGPT) 17 IU/L  0-32     [facility]) Cardiovascular Risk Assessment 04Apr2016 09:02AM Julieta New     Test Name Result Flag Reference   Interpretation Note     Supplement report is available  PDF Image

## 2018-01-22 VITALS
RESPIRATION RATE: 16 BRPM | HEIGHT: 66 IN | TEMPERATURE: 98.4 F | WEIGHT: 223 LBS | DIASTOLIC BLOOD PRESSURE: 78 MMHG | SYSTOLIC BLOOD PRESSURE: 122 MMHG | BODY MASS INDEX: 35.84 KG/M2 | HEART RATE: 78 BPM

## 2018-01-22 VITALS
HEART RATE: 82 BPM | TEMPERATURE: 97.9 F | HEIGHT: 66 IN | SYSTOLIC BLOOD PRESSURE: 122 MMHG | DIASTOLIC BLOOD PRESSURE: 72 MMHG | WEIGHT: 228 LBS | RESPIRATION RATE: 20 BRPM | BODY MASS INDEX: 36.64 KG/M2

## 2018-04-13 ENCOUNTER — OFFICE VISIT (OUTPATIENT)
Dept: PODIATRY | Facility: CLINIC | Age: 53
End: 2018-04-13
Payer: COMMERCIAL

## 2018-04-13 VITALS
HEIGHT: 66 IN | BODY MASS INDEX: 37.77 KG/M2 | WEIGHT: 235 LBS | SYSTOLIC BLOOD PRESSURE: 131 MMHG | RESPIRATION RATE: 17 BRPM | HEART RATE: 72 BPM | DIASTOLIC BLOOD PRESSURE: 85 MMHG

## 2018-04-13 DIAGNOSIS — M54.16 RADICULOPATHY OF LUMBAR REGION: ICD-10-CM

## 2018-04-13 DIAGNOSIS — Q66.50 CONGENITAL PES PLANUS, UNSPECIFIED LATERALITY: ICD-10-CM

## 2018-04-13 DIAGNOSIS — M72.2 PLANTAR FASCIITIS: Primary | ICD-10-CM

## 2018-04-13 DIAGNOSIS — M25.571 ARTHRALGIA OF RIGHT FOOT: ICD-10-CM

## 2018-04-13 DIAGNOSIS — M21.961 ACQUIRED DEFORMITY OF RIGHT FOOT: ICD-10-CM

## 2018-04-13 DIAGNOSIS — M20.11 HALLUX VALGUS OF RIGHT FOOT: ICD-10-CM

## 2018-04-13 PROCEDURE — 20605 DRAIN/INJ JOINT/BURSA W/O US: CPT | Performed by: PODIATRIST

## 2018-04-13 PROCEDURE — 20550 NJX 1 TENDON SHEATH/LIGAMENT: CPT | Performed by: PODIATRIST

## 2018-04-13 PROCEDURE — 99212 OFFICE O/P EST SF 10 MIN: CPT | Performed by: PODIATRIST

## 2018-04-13 RX ORDER — LIFITEGRAST 50 MG/ML
SOLUTION/ DROPS OPHTHALMIC
Refills: 0 | COMMUNITY
Start: 2018-03-19 | End: 2019-01-28

## 2018-04-13 RX ORDER — VERAPAMIL HYDROCHLORIDE 300 MG/1
300 CAPSULE, EXTENDED RELEASE ORAL
COMMUNITY
Start: 2014-05-27 | End: 2018-08-21 | Stop reason: SDUPTHER

## 2018-04-13 RX ORDER — CELECOXIB 200 MG/1
200 CAPSULE ORAL DAILY
Refills: 0 | COMMUNITY
Start: 2018-03-13 | End: 2018-06-21 | Stop reason: ALTCHOICE

## 2018-04-13 RX ORDER — SERTRALINE HYDROCHLORIDE 100 MG/1
100 TABLET, FILM COATED ORAL
COMMUNITY
Start: 2014-12-11 | End: 2018-07-05 | Stop reason: SDUPTHER

## 2018-04-13 RX ORDER — MELOXICAM 7.5 MG/1
7.5 TABLET ORAL DAILY
Qty: 10 TABLET | Refills: 0 | Status: SHIPPED | OUTPATIENT
Start: 2018-04-13 | End: 2018-06-21 | Stop reason: ALTCHOICE

## 2018-04-13 RX ORDER — CLONAZEPAM 0.5 MG/1
0.5 TABLET ORAL 3 TIMES DAILY
COMMUNITY
Start: 2009-04-06 | End: 2018-11-26 | Stop reason: SDUPTHER

## 2018-04-13 RX ORDER — SOLIFENACIN SUCCINATE 10 MG/1
1 TABLET, FILM COATED ORAL
COMMUNITY
Start: 2014-10-30 | End: 2018-05-21 | Stop reason: CLARIF

## 2018-04-13 RX ORDER — OMEPRAZOLE 20 MG/1
20 CAPSULE, DELAYED RELEASE ORAL DAILY
Refills: 0 | COMMUNITY
Start: 2018-03-16 | End: 2019-07-15 | Stop reason: ALTCHOICE

## 2018-04-13 NOTE — PROGRESS NOTES
Assessment/Plan:  Plantar fasciitis  Osteoarthritis  Pes planus  Patient appears to have radiculopathy  Plan  Patient has been referred for chiropractic care  Right 1st MPJ arthrocentesis done  1 cc of Kenalog 10 was injected into right 1st MPJ without pain or complication  In addition left heel injection done, 1 cc of Kenalog 10 was injected into right heel without pain or complication  Patient will use orthotics  She will take Mobic  No problem-specific Assessment & Plan notes found for this encounter  Review of Systems     Constitutional: No fever, no chills, feels well, no tiredness, no recent weight gain or weight loss  Cardiovascular: No complaints of slow heart rate, no fast heart rate, no chest pain, no palpitations, no leg claudication, no lower extremity edema  Active Problems  1  Allergic rhinitis due to pollen (477 0) (J30 1)   2  Anxiety disorder (300 00) (F41 9)   3  Benign essential hypertension (401 1) (I10)   4  BMI 35 0-35 9,adult (V85 35) (Z68 35)   5  Breast lump on right side at 10 o'clock position (611 72) (N63 11)   6  Colon, diverticulosis (562 10) (K57 30)   7  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)   8  Impaired fasting glucose (790 21) (R73 01)   9  Irritable bowel syndrome (564 1) (K58 9)   10  Obesity (278 00) (E66 9)   11  Painful ankle (719 47) (M25 579)   12   Visit for screening mammogram (V76 12) (Z12 31)     Past Medical History   · History of Acute conjunctivitis of right eye (372 00) (H10 31)   · History of Acute maxillary sinusitis (461 0) (J01 00)   · History of Acute maxillary sinusitis, recurrence not specified (461 0) (J01 00)   · History of Acute URI (465 9) (J06 9)   · History of Herniated Intervertebral Disc (722 2)   · History of acute bronchitis (V12 69) (Z87 09)   · History of acute pharyngitis (V12 69) (Z87 09)   · History of dermatitis (V13 3) (Z87 2)   · Influenza vaccine needed (V04 81) (Z23)   · History of Otitis media, unspecified laterality   · History of Pre-op exam (V72 84) (Z01 818)   · History of Screening for diabetes mellitus (DM) (V77 1) (Z13 1)   · History of Urgency of urination (788 63) (R39 15)     Surgical History   · History of Appendectomy   · History of  Section   · History of Exostosectomy Foot For Spur   · History of Knee Surgery   · History of Tubal Ligation     Family History  Mother    · Family history of Diabetes Mellitus (V18 0)   · Family history of Heart Disease (V17 49)  Father    · Family history of Atrial Fibrillation     Social History   · Never a smoker     Current Meds   1  Aspirin Low Dose 81 MG TABS; TAKE 1 TABLET DAILY; Therapy: 94XBI0873 to (Evaluate:2013); Last Rx:2013 Ordered   2  ClonazePAM 0 5 MG Oral Tablet; 1/2 po Q6 hours prn anxiety; Therapy: 82TYB0216 to (Last Rx:31Lqw4149)  Requested for: 2016 Ordered   3  Metaxalone 800 MG Oral Tablet; TAKE 1 TABLET 3 TIMES DAILY AS NEEDED; Therapy: 51GBE2594 to (Evaluate:09Tdf3933)  Requested for: 21GAQ9961; Last   Rx:75Svi7133 Ordered   4  Omeprazole 20 MG Oral Capsule Delayed Release; Therapy: 54NBT0914 to (Evaluate:53Kqi3154) Recorded   5  Sertraline HCl - 100 MG Oral Tablet; take 1 tablet by mouth once daily; Therapy: 15TQC4598 to [phone])  Requested for: 97ZXP9067; Last   Rx:2016 Ordered   6  Verapamil HCl  MG Oral Capsule Extended Release 24 Hour; take 1 capsule by   mouth once daily; Therapy: 40RWG6681 to [phone])  Requested for: 58SJF1720; Last   Rx:2016 Ordered   7  VESIcare 10 MG Oral Tablet; TAKE 1 TABLET Bedtime; Therapy: 01SSC0400 to [phone])  Requested for: 84MKX8998; Last   Rx:2016 Ordered     Allergies  1  Penicillins  2  Bee sting     Vitals    Recorded: 87HPO2349 20:49VF   Systolic 471   Diastolic 96   Height 5 ft 5 5 in   Weight 218 lb    BMI Calculated 35 73   BSA Calculated 2 06      Physical Exam  Left Foot: Appearance: Normal except as noted: Lynita Ped Tenderness: None except the proximal first metatarsal,-- Heel cuneiform,-- extensor digitorum tendon,-- flexor hallucis longus tendon,-- lisfranc joint-- and-- medial longitudinal arch  ROM: subtalar motion was restricted  Right Foot: Appearance: Normal except as noted: pes planus  Great toe deformities include a bunion  Hallux rigidus noted, with dorsal spurring of the first metatarsophalangeal joint  Tenderness: None except the great toe,-- distal first metatarsal-- and-- medial forefoot  Left Ankle: ROM: limited ROM in all planes   Right Ankle: ROM: limited ROM in all planes   Neurological Exam: performed  Light touch was intact bilaterally  Vibratory sensation was intact bilaterally  Response to monofilament test was intact bilaterally  Deep tendon reflexes: patellar reflex present bilaterally-- and-- achilles reflex present bilaterally  Vascular Exam: performed Dorsalis pedis pulses were present bilaterally  Posterior tibial pulses were present bilaterally  Elevation Pallor: absent bilaterally  Dependence rubor was absent bilaterally  Edema: severe bilaterally-- and-- 5/7 pitting edema bilateral  Negative Homans sign  Toenails: All of the toenails were elongated  Hyperkeratosis: none  Shoe Gear Evaluation: performed ()  Recommendation(s): custom inlays  Results/Data  I personally reviewed the films/images/results in the office today  My interpretation follows  X-ray Review 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 noted  Osteoarthritis Lisfranc joint bilateral noted as well  There are no diagnoses linked to this encounter  Subjective:      Patient ID: Venita Sims is a 48 y o  female  Patient has return of pain in her right foot  She has pain in the right big toe joint  She is requesting injection therapy  In addition, patient has new pain  She has pain in her right heel    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        The following portions of the patient's history were reviewed and updated as appropriate: allergies, current medications, past family history, past medical history, past social history, past surgical history and problem list     Review of Systems      Objective:      Foot Exam    General  General Appearance: appears stated age and healthy   Orientation: alert and oriented to person, place, and time   Affect: appropriate   Gait: antalgic       Right Foot/Ankle     Inspection and Palpation  Tenderness: great toe metatarsophalangeal joint and plantar fascia   Arch: pes planus  Hammertoes: second toe and fifth toe  Hallux valgus: yes  Hallux limitus: yes      Left Foot/Ankle      Inspection and Palpation  Tenderness: none   Arch: pes planus  Hallux valgus: yes  Hallux limitus: yes        Physical Exam

## 2018-05-03 ENCOUNTER — OFFICE VISIT (OUTPATIENT)
Dept: PODIATRY | Facility: CLINIC | Age: 53
End: 2018-05-03
Payer: COMMERCIAL

## 2018-05-03 VITALS — HEIGHT: 66 IN | BODY MASS INDEX: 37.77 KG/M2 | WEIGHT: 235 LBS

## 2018-05-03 DIAGNOSIS — M54.16 RADICULOPATHY OF LUMBAR REGION: Primary | ICD-10-CM

## 2018-05-03 DIAGNOSIS — M72.2 PLANTAR FASCIITIS: ICD-10-CM

## 2018-05-03 DIAGNOSIS — G57.51 TARSAL TUNNEL SYNDROME, RIGHT: ICD-10-CM

## 2018-05-03 DIAGNOSIS — Q66.50 CONGENITAL PES PLANUS, UNSPECIFIED LATERALITY: ICD-10-CM

## 2018-05-03 PROCEDURE — 99213 OFFICE O/P EST LOW 20 MIN: CPT | Performed by: PODIATRIST

## 2018-05-03 PROCEDURE — 29540 STRAPPING ANKLE &/FOOT: CPT | Performed by: PODIATRIST

## 2018-05-03 RX ORDER — GABAPENTIN 100 MG/1
100 CAPSULE ORAL 3 TIMES DAILY
Qty: 90 CAPSULE | Refills: 0 | Status: SHIPPED | OUTPATIENT
Start: 2018-05-03 | End: 2018-11-26 | Stop reason: SINTOL

## 2018-05-03 RX ORDER — METHYLPREDNISOLONE 4 MG/1
TABLET ORAL
Qty: 21 TABLET | Refills: 0 | Status: SHIPPED | OUTPATIENT
Start: 2018-05-03 | End: 2018-06-21 | Stop reason: ALTCHOICE

## 2018-05-03 NOTE — PROGRESS NOTES
Assessment/Plan:  Recalcitrant plantar fasciitis  Rule out tarsal tunnel syndrome  Rule out calcaneal stress fracture  Plan  We will change to Medrol Dosepak  We will add gabapentin  Physical therapy will be ordered  Today, right arch bound a low dye arch strapping fashion  Patient may need MRI and/or surgery    No problem-specific Assessment & Plan notes found for this encounter    Active Problems  1  Allergic rhinitis due to pollen (477 0) (J30 1)   2  Anxiety disorder (300 00) (F41 9)   3  Benign essential hypertension (401 1) (I10)   4  BMI 35 0-35 9,adult (V85 35) (Z68 35)   5  Breast lump on right side at 10 o'clock position (611 72) (N63 11)   6  Colon, diverticulosis (562 10) (K57 30)   7  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)   8  Impaired fasting glucose (790 21) (R73 01)   9  Irritable bowel syndrome (564 1) (K58 9)   10  Obesity (278 00) (E66 9)   11  Painful ankle (719 47) (M25 579)   12  Visit for screening mammogram (V76 12) (Z12 31)     Past Medical History   · History of Acute conjunctivitis of right eye (372 00) (H10 31)   · History of Acute maxillary sinusitis (461 0) (J01 00)   · History of Acute maxillary sinusitis, recurrence not specified (461 0) (J01 00)   · History of Acute URI (465 9) (J06 9)   · History of Herniated Intervertebral Disc (722 2)   · History of acute bronchitis (V12 69) (Z87 09)   · History of acute pharyngitis (V12 69) (Z87 09)   · History of dermatitis (V13 3) (Z87 2)   · Influenza vaccine needed (V04 81) (Z23)   · History of Otitis media, unspecified laterality   · History of Pre-op exam (V72 84) (Z01 818)   · History of Screening for diabetes mellitus (DM) (V77 1) (Z13 1)   · History of Urgency of urination (788 63) (R39 15)     Surgical History   · History of Appendectomy   · History of  Section   · History of Exostosectomy Foot For Spur   · History of Knee Surgery   · History of Tubal Ligation     Family History  Mother    · Family history of Diabetes Mellitus (V18 0)   · Family history of Heart Disease (V17 49)  Father    · Family history of Atrial Fibrillation     Social History   · Never a smoker     Current Meds   1  Aspirin Low Dose 81 MG TABS; TAKE 1 TABLET DAILY;   Therapy: 35AOB3080 to (Evaluate:17Jun2013); Last Rx:19Mar2013 Ordered   2  ClonazePAM 0 5 MG Oral Tablet; 1/2 po Q6 hours prn anxiety;   Therapy: 58PLC7579 to (Last Rx:58Dvl6777)  Requested for: 28Oct2016 Ordered   3  Metaxalone 800 MG Oral Tablet; TAKE 1 TABLET 3 TIMES DAILY AS NEEDED;   Therapy: 31EHW4652 to (Evaluate:11Feb2017)  Requested for: 19OYL9624; Last   Rx:01Feb2017 Ordered   4  Omeprazole 20 MG Oral Capsule Delayed Release;   Therapy: 74VHJ9747 to (Evaluate:11Apr2015) Recorded   5  Sertraline HCl - 100 MG Oral Tablet; take 1 tablet by mouth once daily;   Therapy: 33XIF6634 to (Evaluate:23Oct2017)  Requested for: 01WPY9866; Last   Rx:28Oct2016 Ordered   6  Verapamil HCl  MG Oral Capsule Extended Release 24 Hour; take 1 capsule by   mouth once daily;   Therapy: 88NBJ7332 to (Evaluate:23Oct2017)  Requested for: 82DAG5377; Last   Rx:28Oct2016 Ordered   7  VESIcare 10 MG Oral Tablet; TAKE 1 TABLET Bedtime;   Therapy: 37QAA7394 to (Evaluate:23Oct2017)  Requested for: 39FNB4322; Last   Rx:28Oct2016 Ordered     Allergies  1  Penicillins  2  Bee sting     Vitals       Systolic 115   Diastolic 96   Height 5 ft 5 5 in   Weight 218 lb    BMI Calculated 35 73   BSA Calculated 2 06      Physical Exam  Left Foot: Appearance: Normal except as noted:   Tenderness: None except the proximal first metatarsal,-- Heel cuneiform,-- extensor digitorum tendon,-- flexor hallucis longus tendon,-- lisfranc joint-- and-- medial longitudinal arch  ROM: subtalar motion was restricted  Right Foot: Appearance: Normal except as noted: pes planus  Great toe deformities include a bunion  Hallux rigidus noted, with dorsal spurring of the first metatarsophalangeal joint   Tenderness: None except the great toe,-- distal first metatarsal-- and-- medial forefoot  Left Ankle: ROM: limited ROM in all planes   Right Ankle: ROM: limited ROM in all planes   Neurological Exam: performed  Light touch was intact bilaterally  Vibratory sensation was intact bilaterally  Response to monofilament test was intact bilaterally  Deep tendon reflexes: patellar reflex present bilaterally-- and-- achilles reflex present bilaterally  Vascular Exam: performed Dorsalis pedis pulses were present bilaterally  Posterior tibial pulses were present bilaterally  Elevation Pallor: absent bilaterally  Dependence rubor was absent bilaterally  Edema: severe bilaterally-- and-- 5/7 pitting edema bilateral  Negative Homans sign  Toenails: All of the toenails were elongated  Hyperkeratosis: none  Shoe Gear Evaluation: performed ()  Recommendation(s): custom inlays       Results/Data  I personally reviewed the films/images/results in the office today  My interpretation follows  X-ray Review 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 noted  Osteoarthritis Lisfranc joint bilateral noted as well              There are no diagnoses linked to this encounter        Subjective:       Patient ID: Nathan Cody is a 48 y o  female      Patient has return of pain in her right foot  She has pain in the right big toe joint  She is requesting injection therapy  In addition, patient has new pain  She has pain in her right heel    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        The following portions of the patient's history were reviewed and updated as appropriate: allergies, current medications, past family history, past medical history, past social history, past surgical history and problem list      Review of Systems       Objective:      Foot Exam     General  General Appearance: appears stated age and healthy   Orientation: alert and oriented to person, place, and time   Affect: appropriate   Gait: antalgic         Right Foot/Ankle    Inspection and Palpation  Tenderness: great toe metatarsophalangeal joint and plantar fascia   Arch: pes planus  Hammertoes: second toe and fifth toe  Hallux valgus: yes  Hallux limitus: yes        Left Foot/Ankle       Inspection and Palpation  Tenderness: none   Arch: pes planus  Hallux valgus: yes  Hallux limitus: yes          There are no diagnoses linked to this encounter  Subjective:      Patient ID: Venita Sims is a 48 y o  female  HPI    The following portions of the patient's history were reviewed and updated as appropriate: allergies, current medications, past family history, past medical history, past social history, past surgical history and problem list     Review of Systems      Objective:      Foot ExamPhysical Exam

## 2018-05-11 ENCOUNTER — TRANSCRIBE ORDERS (OUTPATIENT)
Dept: ADMINISTRATIVE | Facility: HOSPITAL | Age: 53
End: 2018-05-11

## 2018-05-11 DIAGNOSIS — N63.20 LEFT BREAST LUMP: Primary | ICD-10-CM

## 2018-05-11 DIAGNOSIS — Z12.31 VISIT FOR SCREENING MAMMOGRAM: ICD-10-CM

## 2018-05-18 ENCOUNTER — HOSPITAL ENCOUNTER (OUTPATIENT)
Dept: RADIOLOGY | Facility: HOSPITAL | Age: 53
Discharge: HOME | End: 2018-05-18
Payer: COMMERCIAL

## 2018-05-18 ENCOUNTER — OFFICE VISIT (OUTPATIENT)
Dept: PODIATRY | Facility: CLINIC | Age: 53
End: 2018-05-18
Payer: COMMERCIAL

## 2018-05-18 VITALS
HEART RATE: 78 BPM | RESPIRATION RATE: 16 BRPM | DIASTOLIC BLOOD PRESSURE: 84 MMHG | HEIGHT: 66 IN | BODY MASS INDEX: 37.77 KG/M2 | SYSTOLIC BLOOD PRESSURE: 126 MMHG | WEIGHT: 235 LBS

## 2018-05-18 DIAGNOSIS — G57.51 TARSAL TUNNEL SYNDROME, RIGHT: ICD-10-CM

## 2018-05-18 DIAGNOSIS — M79.671 RIGHT FOOT PAIN: ICD-10-CM

## 2018-05-18 DIAGNOSIS — N63.20 LEFT BREAST LUMP: ICD-10-CM

## 2018-05-18 DIAGNOSIS — M72.2 PLANTAR FASCIITIS: ICD-10-CM

## 2018-05-18 DIAGNOSIS — M21.961 ACQUIRED DEFORMITY OF RIGHT FOOT: ICD-10-CM

## 2018-05-18 DIAGNOSIS — M54.16 RADICULOPATHY OF LUMBAR REGION: Primary | ICD-10-CM

## 2018-05-18 PROCEDURE — 20550 NJX 1 TENDON SHEATH/LIGAMENT: CPT | Performed by: PODIATRIST

## 2018-05-18 PROCEDURE — 99212 OFFICE O/P EST SF 10 MIN: CPT | Performed by: PODIATRIST

## 2018-05-18 PROCEDURE — 77066 DX MAMMO INCL CAD BI: CPT

## 2018-05-18 PROCEDURE — 29540 STRAPPING ANKLE &/FOOT: CPT | Performed by: PODIATRIST

## 2018-05-18 RX ORDER — GABAPENTIN 300 MG/1
300 CAPSULE ORAL 2 TIMES DAILY
Qty: 60 CAPSULE | Refills: 0 | Status: SHIPPED | OUTPATIENT
Start: 2018-05-18 | End: 2018-11-26 | Stop reason: SINTOL

## 2018-05-18 NOTE — PROGRESS NOTES
Assessment/Plan:   Planta fasciitis  Radiculopathy    Plan  Right heel trigger point injection done  1 25 cc of Kenalog 10 was injected into right heel without pain or complication  Patient will continue therapy  We will increase gabapentin to 300 mg b i d   Patient return 1 month  In addition patient's right arch was bound a low dye arch strapping fashion There are no diagnoses linked to this encounter  Subjective:     Patient ID: Niko Roy is a 48 y o  female      No problem-specific Assessment & Plan notes found for this encounter    Active Problems  1  Allergic rhinitis due to pollen (477 0) (J30 1)   2  Anxiety disorder (300 00) (F41 9)   3  Benign essential hypertension (401 1) (I10)   4  BMI 35 0-35 9,adult (V85 35) (Z68 35)   5  Breast lump on right side at 10 o'clock position (611 72) (N63 11)   6  Colon, diverticulosis (562 10) (K57 30)   7  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)   8  Impaired fasting glucose (790 21) (R73 01)   9  Irritable bowel syndrome (564 1) (K58 9)   10  Obesity (278 00) (E66 9)   11  Painful ankle (719 47) (M25 579)   12  Visit for screening mammogram (V76 12) (Z12 31)     Past Medical History   · History of Acute conjunctivitis of right eye (372 00) (H10 31)   · History of Acute maxillary sinusitis (461 0) (J01 00)   · History of Acute maxillary sinusitis, recurrence not specified (461 0) (J01 00)   · History of Acute URI (465 9) (J06 9)   · History of Herniated Intervertebral Disc (722 2)   · History of acute bronchitis (V12 69) (Z87 09)   · History of acute pharyngitis (V12 69) (Z87 09)   · History of dermatitis (V13 3) (Z87 2)   · Influenza vaccine needed (V04 81) (Z23)   · History of Otitis media, unspecified laterality   · History of Pre-op exam (V72 84) (Z01 818)   · History of Screening for diabetes mellitus (DM) (V77 1) (Z13 1)   · History of Urgency of urination (788 63) (R39 15)     Surgical History   · History of Appendectomy   · History of  Section   · History of Exostosectomy Foot For Spur   · History of Knee Surgery   · History of Tubal Ligation     Family History  Mother    · Family history of Diabetes Mellitus (V18 0)   · Family history of Heart Disease (V17 49)  Father    · Family history of Atrial Fibrillation     Social History   · Never a smoker     Current Meds   1  Aspirin Low Dose 81 MG TABS; TAKE 1 TABLET DAILY;   Therapy: 34TCA0769 to (Evaluate:17Jun2013); Last Rx:19Mar2013 Ordered   2  ClonazePAM 0 5 MG Oral Tablet; 1/2 po Q6 hours prn anxiety;   Therapy: 24KMA3843 to (Last Rx:39Nbq8412)  Requested for: 28Oct2016 Ordered   3  Metaxalone 800 MG Oral Tablet; TAKE 1 TABLET 3 TIMES DAILY AS NEEDED;   Therapy: 40ECR9355 to (Evaluate:11Feb2017)  Requested for: 01KRB7457; Last   Rx:01Feb2017 Ordered   4  Omeprazole 20 MG Oral Capsule Delayed Release;   Therapy: 98JBC5410 to (Evaluate:11Apr2015) Recorded   5  Sertraline HCl - 100 MG Oral Tablet; take 1 tablet by mouth once daily;   Therapy: 60XSO2726 to (Evaluate:23Oct2017)  Requested for: 86JAK3587; Last   Rx:28Oct2016 Ordered   6  Verapamil HCl  MG Oral Capsule Extended Release 24 Hour; take 1 capsule by   mouth once daily;   Therapy: 54REF0458 to (Evaluate:23Oct2017)  Requested for: 73QAA7498; Last   Rx:28Oct2016 Ordered   7  VESIcare 10 MG Oral Tablet; TAKE 1 TABLET Bedtime;   Therapy: 96SPO8575 to (Evaluate:23Oct2017)  Requested for: 30DHR8334; Last   Rx:28Oct2016 Ordered     Allergies  1  Penicillins  2  Bee sting     Vitals        Systolic 904   Diastolic 96   Height 5 ft 5 5 in   Weight 218 lb    BMI Calculated 35 73   BSA Calculated 2 06      Physical Exam  Left Foot: Appearance: Normal except as noted:   Tenderness: None except the proximal first metatarsal,-- Heel cuneiform,-- extensor digitorum tendon,-- flexor hallucis longus tendon,-- lisfranc joint-- and-- medial longitudinal arch  ROM: subtalar motion was restricted     Right Foot: Appearance: Normal except as noted: pes planus  Great toe deformities include a bunion  Hallux rigidus noted, with dorsal spurring of the first metatarsophalangeal joint  Tenderness: None except the great toe,-- distal first metatarsal-- and-- medial forefoot  Left Ankle: ROM: limited ROM in all planes   Right Ankle: ROM: limited ROM in all planes   Neurological Exam: performed  Light touch was intact bilaterally  Vibratory sensation was intact bilaterally  Response to monofilament test was intact bilaterally  Deep tendon reflexes: patellar reflex present bilaterally-- and-- achilles reflex present bilaterally  Vascular Exam: performed Dorsalis pedis pulses were present bilaterally  Posterior tibial pulses were present bilaterally  Elevation Pallor: absent bilaterally  Dependence rubor was absent bilaterally  Edema: severe bilaterally-- and-- 5/7 pitting edema bilateral  Negative Homans sign  Toenails: All of the toenails were elongated  Hyperkeratosis: none  Shoe Gear Evaluation: performed ()  Recommendation(s): custom inlays  Patient feels significantly better  However she still has minor heel pain  She has low back pain  She is at therapy  She is taking gabapentin  The following portions of the patient's history were reviewed and updated as appropriate: allergies, current medications, past family history, past medical history, past social history, past surgical history and problem list     Review of Systems      Objective:      Foot ExamPhysical Exam

## 2018-05-20 PROBLEM — N90.4 LICHEN SCLEROSUS OF FEMALE GENITALIA: Status: ACTIVE | Noted: 2017-02-20

## 2018-05-20 PROBLEM — L40.9 PSORIASIS: Status: ACTIVE | Noted: 2017-11-20

## 2018-05-20 PROBLEM — N95.0 PMB (POSTMENOPAUSAL BLEEDING): Status: ACTIVE | Noted: 2018-04-25

## 2018-05-21 ENCOUNTER — OFFICE VISIT (OUTPATIENT)
Dept: FAMILY MEDICINE CLINIC | Facility: CLINIC | Age: 53
End: 2018-05-21
Payer: COMMERCIAL

## 2018-05-21 VITALS
TEMPERATURE: 97.5 F | HEIGHT: 66 IN | SYSTOLIC BLOOD PRESSURE: 126 MMHG | BODY MASS INDEX: 37.12 KG/M2 | DIASTOLIC BLOOD PRESSURE: 80 MMHG | RESPIRATION RATE: 16 BRPM | HEART RATE: 82 BPM | WEIGHT: 231 LBS

## 2018-05-21 DIAGNOSIS — Z11.59 NEED FOR HEPATITIS C SCREENING TEST: ICD-10-CM

## 2018-05-21 DIAGNOSIS — I10 BENIGN ESSENTIAL HYPERTENSION: Primary | ICD-10-CM

## 2018-05-21 DIAGNOSIS — R73.01 IMPAIRED FASTING GLUCOSE: ICD-10-CM

## 2018-05-21 DIAGNOSIS — F41.1 GENERALIZED ANXIETY DISORDER: ICD-10-CM

## 2018-05-21 PROCEDURE — 3008F BODY MASS INDEX DOCD: CPT | Performed by: FAMILY MEDICINE

## 2018-05-21 PROCEDURE — 3074F SYST BP LT 130 MM HG: CPT | Performed by: FAMILY MEDICINE

## 2018-05-21 PROCEDURE — 3079F DIAST BP 80-89 MM HG: CPT | Performed by: FAMILY MEDICINE

## 2018-05-21 PROCEDURE — 99214 OFFICE O/P EST MOD 30 MIN: CPT | Performed by: FAMILY MEDICINE

## 2018-06-15 ENCOUNTER — OFFICE VISIT (OUTPATIENT)
Dept: PODIATRY | Facility: CLINIC | Age: 53
End: 2018-06-15
Payer: COMMERCIAL

## 2018-06-15 VITALS
SYSTOLIC BLOOD PRESSURE: 125 MMHG | DIASTOLIC BLOOD PRESSURE: 83 MMHG | RESPIRATION RATE: 17 BRPM | WEIGHT: 231 LBS | HEIGHT: 66 IN | HEART RATE: 89 BPM | BODY MASS INDEX: 37.12 KG/M2

## 2018-06-15 DIAGNOSIS — M20.11 HALLUX VALGUS OF RIGHT FOOT: Primary | ICD-10-CM

## 2018-06-15 DIAGNOSIS — M54.16 RADICULOPATHY OF LUMBAR REGION: ICD-10-CM

## 2018-06-15 DIAGNOSIS — M72.2 PLANTAR FASCIITIS: ICD-10-CM

## 2018-06-15 DIAGNOSIS — M21.961 ACQUIRED DEFORMITY OF RIGHT FOOT: ICD-10-CM

## 2018-06-15 DIAGNOSIS — M25.571 ARTHRALGIA OF RIGHT FOOT: ICD-10-CM

## 2018-06-15 PROCEDURE — 99212 OFFICE O/P EST SF 10 MIN: CPT | Performed by: PODIATRIST

## 2018-06-15 PROCEDURE — 20605 DRAIN/INJ JOINT/BURSA W/O US: CPT | Performed by: PODIATRIST

## 2018-06-15 RX ORDER — GABAPENTIN 600 MG/1
600 TABLET ORAL 2 TIMES DAILY
Qty: 60 TABLET | Refills: 1 | Status: SHIPPED | OUTPATIENT
Start: 2018-06-15 | End: 2018-11-26 | Stop reason: SINTOL

## 2018-06-15 NOTE — PROGRESS NOTES
Procedures   Foot Exam patient has pain in her right big toe  She is requesting injection therapy  She is considering surgical intervention  Patient is doing very well and gabapentin  She has much less back and leg pain  Plan  Right 1st MPJ arthrocentesis done  1 cc of Kenalog 10 was injected into right 1st MPJ without pain or complication  We will increase gabapentin dosing to 600 mg b i d   Patient may need foot surgery in the future       Patient ID: Jacoby Peguero is a 48 y o  female      No problem-specific Assessment & Plan notes found for this encounter    Active Problems  1  Allergic rhinitis due to pollen (477 0) (J30 1)   2  Anxiety disorder (300 00) (F41 9)   3  Benign essential hypertension (401 1) (I10)   4  BMI 35 0-35 9,adult (V85 35) (Z68 35)   5  Breast lump on right side at 10 o'clock position (611 72) (N63 11)   6  Colon, diverticulosis (562 10) (K57 30)   7  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)   8  Impaired fasting glucose (790 21) (R73 01)   9  Irritable bowel syndrome (564 1) (K58 9)   10  Obesity (278 00) (E66 9)   11  Painful ankle (719 47) (M25 579)   12  Visit for screening mammogram (V76 12) (Z12 31)     Past Medical History   · History of Acute conjunctivitis of right eye (372 00) (H10 31)   · History of Acute maxillary sinusitis (461 0) (J01 00)   · History of Acute maxillary sinusitis, recurrence not specified (461 0) (J01 00)   · History of Acute URI (465 9) (J06 9)   · History of Herniated Intervertebral Disc (722 2)   · History of acute bronchitis (V12 69) (Z87 09)   · History of acute pharyngitis (V12 69) (Z87 09)   · History of dermatitis (V13 3) (Z87 2)   · Influenza vaccine needed (V04 81) (Z23)   · History of Otitis media, unspecified laterality   · History of Pre-op exam (V72 84) (Z01 818)   · History of Screening for diabetes mellitus (DM) (V77 1) (Z13 1)   · History of Urgency of urination (788 63) (R39 15)     Surgical History   · History of Appendectomy   · History of  Section   · History of Exostosectomy Foot For Spur   · History of Knee Surgery   · History of Tubal Ligation     Family History  Mother    · Family history of Diabetes Mellitus (V18 0)   · Family history of Heart Disease (V17 49)  Father    · Family history of Atrial Fibrillation     Social History   · Never a smoker     Current Meds   1  Aspirin Low Dose 81 MG TABS; TAKE 1 TABLET DAILY;   Therapy: 41EMT2390 to (Evaluate:2013); Last Rx:2013 Ordered   2  ClonazePAM 0 5 MG Oral Tablet; 1/2 po Q6 hours prn anxiety;   Therapy: 06QQG1906 to (Last Rx:23Fic0350)  Requested for: 2016 Ordered   3  Metaxalone 800 MG Oral Tablet; TAKE 1 TABLET 3 TIMES DAILY AS NEEDED;   Therapy: 19JAW9009 to (Evaluate:2017)  Requested for: 94LGM2300; Last   Rx:2017 Ordered   4  Omeprazole 20 MG Oral Capsule Delayed Release;   Therapy: 89PMK4799 to (Evaluate:2015) Recorded   5  Sertraline HCl - 100 MG Oral Tablet; take 1 tablet by mouth once daily;   Therapy: 43RSX7116 to (Evaluate:2017)  Requested for: 59SNX4443; Last   Rx:2016 Ordered   6  Verapamil HCl  MG Oral Capsule Extended Release 24 Hour; take 1 capsule by   mouth once daily;   Therapy: 81NMS4930 to (Evaluate:2017)  Requested for: 07AXX4267; Last   Rx:2016 Ordered   7  VESIcare 10 MG Oral Tablet; TAKE 1 TABLET Bedtime;   Therapy: 53QVO1300 to (Evaluate:2017)  Requested for: 55NMG2288; Last   Rx:2016 Ordered     Allergies  1  Penicillins  2  Bee sting     Vitals        Systolic 177   Diastolic 96   Height 5 ft 5 5 in   Weight 218 lb    BMI Calculated 35 73   BSA Calculated 2 06      Physical Exam  Left Foot: Appearance: Normal except as noted:   Tenderness: None except the proximal first metatarsal,-- Heel cuneiform,-- extensor digitorum tendon,-- flexor hallucis longus tendon,-- lisfranc joint-- and-- medial longitudinal arch  ROM: subtalar motion was restricted     Right Foot: Appearance: Normal except as noted: pes planus  Great toe deformities include a bunion  Hallux rigidus noted, with dorsal spurring of the first metatarsophalangeal joint  Tenderness: None except the great toe,-- distal first metatarsal-- and-- medial forefoot  Left Ankle: ROM: limited ROM in all planes   Right Ankle: ROM: limited ROM in all planes   Neurological Exam: performed  Light touch was intact bilaterally  Vibratory sensation was intact bilaterally  Response to monofilament test was intact bilaterally  Deep tendon reflexes: patellar reflex present bilaterally-- and-- achilles reflex present bilaterally  Vascular Exam: performed Dorsalis pedis pulses were present bilaterally  Posterior tibial pulses were present bilaterally  Elevation Pallor: absent bilaterally  Dependence rubor was absent bilaterally  Edema: severe bilaterally-- and-- 5/7 pitting edema bilateral  Negative Homans sign  Toenails: All of the toenails were elongated  Hyperkeratosis: none  Shoe Gear Evaluation: performed ()  Recommendation(s): custom inlays       Patient feels significantly better  However she still has minor heel pain  She has low back pain  She is at therapy    She is taking gabapentin         The following portions of the patient's history were reviewed and updated as appropriate: allergies, current medications, past family history, past medical history, past social history, past surgical history and problem list

## 2018-06-20 ENCOUNTER — TELEPHONE (OUTPATIENT)
Dept: FAMILY MEDICINE CLINIC | Facility: CLINIC | Age: 53
End: 2018-06-20

## 2018-06-20 DIAGNOSIS — N28.9 ABNORMAL KIDNEY FUNCTION: Primary | ICD-10-CM

## 2018-06-20 LAB
ALBUMIN SERPL-MCNC: 4.4 G/DL (ref 3.5–5.5)
ALBUMIN/GLOB SERPL: 1.5 {RATIO} (ref 1.2–2.2)
ALP SERPL-CCNC: 103 IU/L (ref 39–117)
ALT SERPL-CCNC: 16 IU/L (ref 0–32)
AST SERPL-CCNC: 13 IU/L (ref 0–40)
BILIRUB SERPL-MCNC: 0.2 MG/DL (ref 0–1.2)
BUN SERPL-MCNC: 18 MG/DL (ref 6–24)
BUN/CREAT SERPL: 17 (ref 9–23)
CALCIUM SERPL-MCNC: 9.5 MG/DL (ref 8.7–10.2)
CHLORIDE SERPL-SCNC: 101 MMOL/L (ref 96–106)
CHOLEST SERPL-MCNC: 187 MG/DL (ref 100–199)
CO2 SERPL-SCNC: 22 MMOL/L (ref 20–29)
CREAT SERPL-MCNC: 1.07 MG/DL (ref 0.57–1)
ERYTHROCYTE [DISTWIDTH] IN BLOOD BY AUTOMATED COUNT: 16.2 % (ref 12.3–15.4)
GLOBULIN SER-MCNC: 2.9 G/DL (ref 1.5–4.5)
GLUCOSE SERPL-MCNC: 93 MG/DL (ref 65–99)
HCT VFR BLD AUTO: 40.5 % (ref 34–46.6)
HCV AB S/CO SERPL IA: 0.1 S/CO RATIO (ref 0–0.9)
HDLC SERPL-MCNC: 57 MG/DL
HGB BLD-MCNC: 13.6 G/DL (ref 11.1–15.9)
LDLC SERPL CALC-MCNC: 112 MG/DL (ref 0–99)
LDLC/HDLC SERPL: 2 RATIO (ref 0–3.2)
MCH RBC QN AUTO: 27.5 PG (ref 26.6–33)
MCHC RBC AUTO-ENTMCNC: 33.6 G/DL (ref 31.5–35.7)
MCV RBC AUTO: 82 FL (ref 79–97)
MICRODELETION SYND BLD/T FISH: NORMAL
MICRODELETION SYND BLD/T FISH: NORMAL
PLATELET # BLD AUTO: 318 X10E3/UL (ref 150–379)
POTASSIUM SERPL-SCNC: 4.5 MMOL/L (ref 3.5–5.2)
PROT SERPL-MCNC: 7.3 G/DL (ref 6–8.5)
RBC # BLD AUTO: 4.95 X10E6/UL (ref 3.77–5.28)
SL AMB EGFR AFRICAN AMERICAN: 68 ML/MIN/1.73
SL AMB EGFR NON AFRICAN AMERICAN: 59 ML/MIN/1.73
SL AMB PDF IMAGE: NORMAL
SL AMB VLDL CHOLESTEROL CALC: 18 MG/DL (ref 5–40)
SODIUM SERPL-SCNC: 142 MMOL/L (ref 134–144)
TRIGL SERPL-MCNC: 89 MG/DL (ref 0–149)
TSH SERPL DL<=0.005 MIU/L-ACNC: 2.75 UIU/ML (ref 0.45–4.5)
WBC # BLD AUTO: 10.6 X10E3/UL (ref 3.4–10.8)

## 2018-06-20 NOTE — TELEPHONE ENCOUNTER
Pt called back again, wanted to know results so she could sleep tonight  Gave her the info on the kidney function and she knows she needs to recheck in 3 months  Can be reached on her cell anytime tomorrow if need be

## 2018-06-20 NOTE — TELEPHONE ENCOUNTER
6/20/2018 4:18 PM called Jeff Simpson regarding her lab results  Her kidney function is slightly abnormal   Her creatinine is up to 1 07  I would like her to get it rechecked in 3 months  Her sugar, liver enzymes, blood count, TSH and cholesterol are normal      Message left on her voice mail to call the office    Darin Chahal DO

## 2018-06-21 NOTE — TELEPHONE ENCOUNTER
6/21/2018 12:05 PM spoke with Lady Mendoza  Reviewed labs  Advised to avoid Advil and Aleve  Increased water intake recommended  She agreed to get her kidney function rechecked in 3 months    Tosha Esquivel, DO

## 2018-06-21 NOTE — TELEPHONE ENCOUNTER
6/21/2018 8:49 AM Returned call to patient  Message left on voice mail to call the office     Slip for BMP mailed   Taylor Smyth DO

## 2018-07-05 DIAGNOSIS — F41.1 GENERALIZED ANXIETY DISORDER: Primary | ICD-10-CM

## 2018-07-05 RX ORDER — SERTRALINE HYDROCHLORIDE 100 MG/1
TABLET, FILM COATED ORAL
Qty: 90 TABLET | Refills: 1 | Status: SHIPPED | OUTPATIENT
Start: 2018-07-05 | End: 2018-11-26 | Stop reason: SDUPTHER

## 2018-07-22 DIAGNOSIS — M54.16 RADICULOPATHY OF LUMBAR REGION: Primary | ICD-10-CM

## 2018-07-23 RX ORDER — METAXALONE 800 MG/1
TABLET ORAL
Qty: 30 TABLET | Refills: 0 | Status: SHIPPED | OUTPATIENT
Start: 2018-07-23 | End: 2019-07-24 | Stop reason: SDUPTHER

## 2018-07-31 LAB
BUN SERPL-MCNC: 14 MG/DL (ref 6–24)
BUN/CREAT SERPL: 13 (ref 9–23)
CALCIUM SERPL-MCNC: 9.2 MG/DL (ref 8.7–10.2)
CHLORIDE SERPL-SCNC: 104 MMOL/L (ref 96–106)
CO2 SERPL-SCNC: 22 MMOL/L (ref 20–29)
CREAT SERPL-MCNC: 1.05 MG/DL (ref 0.57–1)
GLUCOSE SERPL-MCNC: 89 MG/DL (ref 65–99)
POTASSIUM SERPL-SCNC: 4.5 MMOL/L (ref 3.5–5.2)
SL AMB EGFR AFRICAN AMERICAN: 70 ML/MIN/1.73
SL AMB EGFR NON AFRICAN AMERICAN: 61 ML/MIN/1.73
SODIUM SERPL-SCNC: 141 MMOL/L (ref 134–144)

## 2018-08-21 DIAGNOSIS — I10 BENIGN ESSENTIAL HYPERTENSION: Primary | ICD-10-CM

## 2018-08-21 RX ORDER — VERAPAMIL HYDROCHLORIDE 300 MG/1
CAPSULE, EXTENDED RELEASE ORAL
Qty: 90 CAPSULE | Refills: 1 | Status: SHIPPED | OUTPATIENT
Start: 2018-08-21 | End: 2018-11-26 | Stop reason: SDUPTHER

## 2018-11-05 ENCOUNTER — TELEPHONE (OUTPATIENT)
Dept: FAMILY MEDICINE CLINIC | Facility: CLINIC | Age: 53
End: 2018-11-05

## 2018-11-05 DIAGNOSIS — R73.01 IMPAIRED FASTING GLUCOSE: ICD-10-CM

## 2018-11-05 DIAGNOSIS — I10 BENIGN ESSENTIAL HYPERTENSION: Primary | ICD-10-CM

## 2018-11-05 NOTE — TELEPHONE ENCOUNTER
Patient aware and stated she will  on Friday 11/9 when she is here for her flu shot    No further action needed

## 2018-11-09 ENCOUNTER — IMMUNIZATION (OUTPATIENT)
Dept: FAMILY MEDICINE CLINIC | Facility: CLINIC | Age: 53
End: 2018-11-09
Payer: COMMERCIAL

## 2018-11-09 DIAGNOSIS — Z23 ENCOUNTER FOR IMMUNIZATION: ICD-10-CM

## 2018-11-09 PROCEDURE — 90682 RIV4 VACC RECOMBINANT DNA IM: CPT

## 2018-11-09 PROCEDURE — 90471 IMMUNIZATION ADMIN: CPT

## 2018-11-20 LAB
ALBUMIN SERPL-MCNC: 4.4 G/DL (ref 3.5–5.5)
ALBUMIN/GLOB SERPL: 1.4 {RATIO} (ref 1.2–2.2)
ALP SERPL-CCNC: 98 IU/L (ref 39–117)
ALT SERPL-CCNC: 24 IU/L (ref 0–32)
AST SERPL-CCNC: 21 IU/L (ref 0–40)
BILIRUB SERPL-MCNC: 0.3 MG/DL (ref 0–1.2)
BUN SERPL-MCNC: 17 MG/DL (ref 6–24)
BUN/CREAT SERPL: 18 (ref 9–23)
CALCIUM SERPL-MCNC: 9.1 MG/DL (ref 8.7–10.2)
CHLORIDE SERPL-SCNC: 105 MMOL/L (ref 96–106)
CHOLEST SERPL-MCNC: 190 MG/DL (ref 100–199)
CO2 SERPL-SCNC: 21 MMOL/L (ref 20–29)
CREAT SERPL-MCNC: 0.97 MG/DL (ref 0.57–1)
ERYTHROCYTE [DISTWIDTH] IN BLOOD BY AUTOMATED COUNT: 15.3 % (ref 12.3–15.4)
EST. AVERAGE GLUCOSE BLD GHB EST-MCNC: 108 MG/DL
GLOBULIN SER-MCNC: 3.1 G/DL (ref 1.5–4.5)
GLUCOSE SERPL-MCNC: 106 MG/DL (ref 65–99)
HBA1C MFR BLD: 5.4 % (ref 4.8–5.6)
HCT VFR BLD AUTO: 40.5 % (ref 34–46.6)
HDLC SERPL-MCNC: 59 MG/DL
HGB BLD-MCNC: 13.3 G/DL (ref 11.1–15.9)
LDLC SERPL CALC-MCNC: 110 MG/DL (ref 0–99)
LDLC/HDLC SERPL: 1.9 RATIO (ref 0–3.2)
MCH RBC QN AUTO: 26.9 PG (ref 26.6–33)
MCHC RBC AUTO-ENTMCNC: 32.8 G/DL (ref 31.5–35.7)
MCV RBC AUTO: 82 FL (ref 79–97)
MICRODELETION SYND BLD/T FISH: NORMAL
PLATELET # BLD AUTO: 326 X10E3/UL (ref 150–379)
POTASSIUM SERPL-SCNC: 4.6 MMOL/L (ref 3.5–5.2)
PROT SERPL-MCNC: 7.5 G/DL (ref 6–8.5)
RBC # BLD AUTO: 4.95 X10E6/UL (ref 3.77–5.28)
SL AMB EGFR AFRICAN AMERICAN: 77 ML/MIN/1.73
SL AMB EGFR NON AFRICAN AMERICAN: 67 ML/MIN/1.73
SL AMB VLDL CHOLESTEROL CALC: 21 MG/DL (ref 5–40)
SODIUM SERPL-SCNC: 141 MMOL/L (ref 134–144)
TRIGL SERPL-MCNC: 106 MG/DL (ref 0–149)
WBC # BLD AUTO: 7.1 X10E3/UL (ref 3.4–10.8)

## 2018-11-26 ENCOUNTER — OFFICE VISIT (OUTPATIENT)
Dept: FAMILY MEDICINE CLINIC | Facility: CLINIC | Age: 53
End: 2018-11-26
Payer: COMMERCIAL

## 2018-11-26 VITALS
WEIGHT: 232 LBS | HEART RATE: 82 BPM | SYSTOLIC BLOOD PRESSURE: 132 MMHG | HEIGHT: 66 IN | BODY MASS INDEX: 37.28 KG/M2 | DIASTOLIC BLOOD PRESSURE: 70 MMHG | RESPIRATION RATE: 16 BRPM | TEMPERATURE: 98.8 F

## 2018-11-26 DIAGNOSIS — E66.01 SEVERE OBESITY (BMI 35.0-39.9) WITH COMORBIDITY (HCC): ICD-10-CM

## 2018-11-26 DIAGNOSIS — I10 BENIGN ESSENTIAL HYPERTENSION: Primary | ICD-10-CM

## 2018-11-26 DIAGNOSIS — M54.16 RADICULOPATHY OF LUMBAR REGION: ICD-10-CM

## 2018-11-26 DIAGNOSIS — F41.1 GENERALIZED ANXIETY DISORDER: ICD-10-CM

## 2018-11-26 DIAGNOSIS — R73.01 IMPAIRED FASTING GLUCOSE: ICD-10-CM

## 2018-11-26 PROCEDURE — 3075F SYST BP GE 130 - 139MM HG: CPT | Performed by: FAMILY MEDICINE

## 2018-11-26 PROCEDURE — 1036F TOBACCO NON-USER: CPT | Performed by: FAMILY MEDICINE

## 2018-11-26 PROCEDURE — 3008F BODY MASS INDEX DOCD: CPT | Performed by: FAMILY MEDICINE

## 2018-11-26 PROCEDURE — 99214 OFFICE O/P EST MOD 30 MIN: CPT | Performed by: FAMILY MEDICINE

## 2018-11-26 PROCEDURE — 3078F DIAST BP <80 MM HG: CPT | Performed by: FAMILY MEDICINE

## 2018-11-26 RX ORDER — CLOBETASOL PROPIONATE 0.5 MG/G
CREAM TOPICAL 2 TIMES DAILY PRN
COMMUNITY

## 2018-11-26 RX ORDER — VERAPAMIL HYDROCHLORIDE 300 MG/1
300 CAPSULE, EXTENDED RELEASE ORAL DAILY
Qty: 90 CAPSULE | Refills: 1 | Status: SHIPPED | OUTPATIENT
Start: 2018-11-26 | End: 2019-08-23 | Stop reason: SDUPTHER

## 2018-11-26 RX ORDER — SERTRALINE HYDROCHLORIDE 100 MG/1
100 TABLET, FILM COATED ORAL DAILY
Qty: 90 TABLET | Refills: 1 | Status: SHIPPED | OUTPATIENT
Start: 2018-11-26 | End: 2019-11-01 | Stop reason: SDUPTHER

## 2018-11-26 RX ORDER — CLONAZEPAM 0.5 MG/1
0.5 TABLET ORAL 2 TIMES DAILY PRN
Qty: 20 TABLET | Refills: 0 | Status: SHIPPED | OUTPATIENT
Start: 2018-11-26 | End: 2019-09-17 | Stop reason: SDUPTHER

## 2018-11-26 NOTE — PATIENT INSTRUCTIONS
Recent Results (from the past 672 hour(s))   CBC    Collection Time: 11/19/18  8:30 AM   Result Value Ref Range    White Blood Cell Count 7 1 3 4 - 10 8 x10E3/uL    Red Blood Cell Count 4 95 3 77 - 5 28 x10E6/uL    Hemoglobin 13 3 11 1 - 15 9 g/dL    HCT 40 5 34 0 - 46 6 %    MCV 82 79 - 97 fL    MCH 26 9 26 6 - 33 0 pg    MCHC 32 8 31 5 - 35 7 g/dL    RDW 15 3 12 3 - 15 4 %    Platelet Count 033 300 - 379 x10E3/uL   Comprehensive metabolic panel    Collection Time: 11/19/18  8:30 AM   Result Value Ref Range    Glucose, Random 106 (H) 65 - 99 mg/dL    BUN 17 6 - 24 mg/dL    Creatinine 0 97 0 57 - 1 00 mg/dL    eGFR Non  67 >59 mL/min/1 73    SL AMB EGFR AFRICAN AMERICAN 77 >59 mL/min/1 73    SL AMB BUN/CREATININE RATIO 18 9 - 23    Sodium 141 134 - 144 mmol/L    Potassium 4 6 3 5 - 5 2 mmol/L    Chloride 105 96 - 106 mmol/L    CO2 21 20 - 29 mmol/L    CALCIUM 9 1 8 7 - 10 2 mg/dL    SL AMB PROTEIN, TOTAL 7 5 6 0 - 8 5 g/dL    Albumin 4 4 3 5 - 5 5 g/dL    Globulin, Total 3 1 1 5 - 4 5 g/dL    Albumin/Globulin Ratio 1 4 1 2 - 2 2    TOTAL BILIRUBIN 0 3 0 0 - 1 2 mg/dL    Alk Phos Isoenzymes 98 39 - 117 IU/L    SL AMB AST 21 0 - 40 IU/L    SL AMB ALT 24 0 - 32 IU/L   Hemoglobin A1C    Collection Time: 11/19/18  8:30 AM   Result Value Ref Range    Hemoglobin A1C 5 4 4 8 - 5 6 %    Estimated Average Glucose 108 mg/dL   Lipid Panel with Direct LDL reflex    Collection Time: 11/19/18  8:30 AM   Result Value Ref Range    Cholesterol, Total 190 100 - 199 mg/dL    Triglycerides 106 0 - 149 mg/dL    HDL 59 >39 mg/dL    VLDL Cholesterol Calculated 21 5 - 40 mg/dL    LDL Direct 110 (H) 0 - 99 mg/dL    LDl/HDL Ratio 1 9 0 0 - 3 2 ratio   Cardiovascular Report    Collection Time: 11/19/18  8:30 AM   Result Value Ref Range    Interpretation Note

## 2018-11-26 NOTE — PROGRESS NOTES
Assessment/Plan:    Problem List Items Addressed This Visit     Benign essential hypertension - Primary     Well controlled         Relevant Medications    verapamil (VERELAN) 300 MG CP24    Other Relevant Orders    CBC    Comprehensive metabolic panel    Lipid Panel with Direct LDL reflex    Generalized anxiety disorder     Stable          Relevant Medications    clonazePAM (KlonoPIN) 0 5 mg tablet    sertraline (ZOLOFT) 100 mg tablet    Impaired fasting glucose     Fasting sugar is up to 106, but A1c is normal          Relevant Orders    Hemoglobin A1C    Radiculopathy of lumbar region    Relevant Orders    Ambulatory Referral to Comprehensive Spine Program      Other Visit Diagnoses     Severe obesity (BMI 35 0-39  9) with comorbidity ([address] )        unchanged          Patient Instructions     Recent Results (from the past 672 hour(s))   CBC    Collection Time: 11/19/18  8:30 AM   Result Value Ref Range    White Blood Cell Count 7 1 3 4 - 10 8 x10E3/uL    Red Blood Cell Count 4 95 3 77 - 5 28 x10E6/uL    Hemoglobin 13 3 11 1 - 15 9 g/dL    HCT 40 5 34 0 - 46 6 %    MCV 82 79 - 97 fL    MCH 26 9 26 6 - 33 0 pg    MCHC 32 8 31 5 - 35 7 g/dL    RDW 15 3 12 3 - 15 4 %    Platelet Count 637 639 - 379 x10E3/uL   Comprehensive metabolic panel    Collection Time: 11/19/18  8:30 AM   Result Value Ref Range    Glucose, Random 106 (H) 65 - 99 mg/dL    BUN 17 6 - 24 mg/dL    Creatinine 0 97 0 57 - 1 00 mg/dL    eGFR Non  67 >59 mL/min/1 73    SL AMB EGFR AFRICAN AMERICAN 77 >59 mL/min/1 73    SL AMB BUN/CREATININE RATIO 18 9 - 23    Sodium 141 134 - 144 mmol/L    Potassium 4 6 3 5 - 5 2 mmol/L    Chloride 105 96 - 106 mmol/L    CO2 21 20 - 29 mmol/L    CALCIUM 9 1 8 7 - 10 2 mg/dL    SL AMB PROTEIN, TOTAL 7 5 6 0 - 8 5 g/dL    Albumin 4 4 3 5 - 5 5 g/dL    Globulin, Total 3 1 1 5 - 4 5 g/dL    Albumin/Globulin Ratio 1 4 1 2 - 2 2    TOTAL BILIRUBIN 0 3 0 0 - 1 2 mg/dL    Alk Phos Isoenzymes 98 39 - 117 IU/L    SL AMB AST 21 0 - 40 IU/L    SL AMB ALT 24 0 - 32 IU/L   Hemoglobin A1C    Collection Time: 11/19/18  8:30 AM   Result Value Ref Range    Hemoglobin A1C 5 4 4 8 - 5 6 %    Estimated Average Glucose 108 mg/dL   Lipid Panel with Direct LDL reflex    Collection Time: 11/19/18  8:30 AM   Result Value Ref Range    Cholesterol, Total 190 100 - 199 mg/dL    Triglycerides 106 0 - 149 mg/dL    HDL 59 >39 mg/dL    VLDL Cholesterol Calculated 21 5 - 40 mg/dL    LDL Direct 110 (H) 0 - 99 mg/dL    LDl/HDL Ratio 1 9 0 0 - 3 2 ratio   Cardiovascular Report    Collection Time: 11/19/18  8:30 AM   Result Value Ref Range    Interpretation Note            Return in about 6 months (around 5/26/2019) for Annual physical     Subjective:      Patient ID: Mirza Husbands is a 48 y o  female  Chief Complaint   Patient presents with    Follow-up     medications     Results       Her anxiety has been controlled with the zoloft  She has not needed any clonazepam        Hypertension   This is a chronic problem  The current episode started more than 1 year ago  The problem is unchanged  The problem is controlled  Past treatments include calcium channel blockers  The current treatment provides moderate improvement  There are no compliance problems  The following portions of the patient's history were reviewed and updated as appropriate:  past social history    Review of Systems   Respiratory: Negative  Cardiovascular: Negative            Current Outpatient Prescriptions   Medication Sig Dispense Refill    aspirin (ASPIRIN LOW DOSE) 81 MG tablet Take 1 tablet by mouth daily      clobetasol (TEMOVATE) 0 05 % cream Apply topically 2 (two) times a day as needed      clonazePAM (KlonoPIN) 0 5 mg tablet Take 1 tablet (0 5 mg total) by mouth 2 (two) times a day as needed for seizures 20 tablet 0    diclofenac sodium (VOLTAREN) 1 % Place on the skin      metaxalone (SKELAXIN) 800 mg tablet take 1 tablet by mouth three times a day if needed 30 tablet 0    omeprazole (PriLOSEC) 20 mg delayed release capsule Take 20 mg by mouth daily  0    sertraline (ZOLOFT) 100 mg tablet Take 1 tablet (100 mg total) by mouth daily 90 tablet 1    verapamil (VERELAN) 300 MG CP24 Take 1 capsule (300 mg total) by mouth daily 90 capsule 1    XIIDRA 5 % op solution   0     No current facility-administered medications for this visit  Objective:    /70   Pulse 82   Temp 98 8 °F (37 1 °C)   Resp 16   Ht 5' 5 5" (1 664 m)   Wt 105 kg (232 lb)   BMI 38 02 kg/m²        Physical Exam   Constitutional: She appears well-developed and well-nourished  HENT:   Head: Normocephalic and atraumatic  Right Ear: External ear normal    Left Ear: External ear normal    Mouth/Throat: Oropharynx is clear and moist    Cardiovascular: Normal rate, regular rhythm and normal heart sounds  Exam reveals no friction rub  No murmur heard  Pulmonary/Chest: Effort normal and breath sounds normal  No respiratory distress  She has no wheezes  She has no rales  Musculoskeletal: She exhibits no edema or deformity  Psychiatric: She has a normal mood and affect  Her behavior is normal  Judgment and thought content normal    Nursing note and vitals reviewed  Sg Lester DO    BMI Counseling: Body mass index is 38 02 kg/m²  Discussed with patient's BMI with her  The BMI is above average  BMI counseling and education was provided to the patient  Nutrition recommendations include 3-5 servings of fruits/vegetables daily and moderation in carbohydrate intake

## 2018-11-27 ENCOUNTER — TELEPHONE (OUTPATIENT)
Dept: PHYSICAL THERAPY | Facility: OTHER | Age: 53
End: 2018-11-27

## 2018-11-27 NOTE — TELEPHONE ENCOUNTER
Unsuccessful attempt at reaching pt  Left vm with [facility] c/b# asking for return call  Also provided CS hours

## 2018-12-03 ENCOUNTER — TELEPHONE (OUTPATIENT)
Dept: PHYSICAL THERAPY | Facility: OTHER | Age: 53
End: 2018-12-03

## 2018-12-03 NOTE — TELEPHONE ENCOUNTER
2nd attempt at reaching pt unsuccessful  Left vm with [facility] c/b# asking for return call  Also provided CS hours

## 2019-01-25 ENCOUNTER — NURSE TRIAGE (OUTPATIENT)
Dept: PHYSICAL THERAPY | Facility: OTHER | Age: 54
End: 2019-01-25

## 2019-01-25 DIAGNOSIS — M54.2 CHRONIC NECK PAIN: ICD-10-CM

## 2019-01-25 DIAGNOSIS — M54.9 UPPER BACK PAIN ON LEFT SIDE: ICD-10-CM

## 2019-01-25 DIAGNOSIS — M54.5 CHRONIC RIGHT-SIDED LOW BACK PAIN, WITH SCIATICA PRESENCE UNSPECIFIED: Primary | ICD-10-CM

## 2019-01-25 DIAGNOSIS — R20.0 NUMBNESS AND TINGLING IN LEFT ARM: ICD-10-CM

## 2019-01-25 DIAGNOSIS — R20.2 NUMBNESS AND TINGLING IN LEFT ARM: ICD-10-CM

## 2019-01-25 DIAGNOSIS — G89.29 CHRONIC NECK PAIN: ICD-10-CM

## 2019-01-25 DIAGNOSIS — G89.29 CHRONIC RIGHT-SIDED LOW BACK PAIN, WITH SCIATICA PRESENCE UNSPECIFIED: Primary | ICD-10-CM

## 2019-01-25 NOTE — TELEPHONE ENCOUNTER
Reason for Disposition   Is this a chronic condition? Background - Initial Assessment  Clinical complaint: Lower back pain on the R side; neck pain traveling down L side causing arm numbness/tingling  Date of onset: 15 years low back pain; neck pain - 4 years  Mechanism of injury: 15 years ago patient was at beach carrying nieces and lifted nieces to avoid a wave and herniated discs in back; neck pain - patient reports arthritis and spondylosis    Previous Treatment - Previous Treatment  Previous evaluation: PCP, PT, orthopedic eval 15 yrs ago through [address] [place] [address]  Current provider: Dr Laboy Isaiah: MRI 15 years   Previous treatment: PT for both lower back and neck pain; Rx for metaxalone PRN    Protocols used:  AMB COMPREHENSIVE SPINE PROGRAM PROTOCOL    Patient called in looking to make an appointment with a spine specialist  RN provided patient an overview of the Comprehensive Spine Program including: triage assessment, physical therapy, and additional specialist referral options based on symptoms and chronicity  Research has shown great results when starting physical therapy sooner than later which helps reduce imaging and costs to patients  Patient states she is experiencing a chronic issue and has done multiple rounds of physical therapy in the past  Patient is interested in completing triage assessment, and discussing options  Patient reports chronic low back pain for 15 years, primarily on the R side  Patient reports herniated discs in her lumbar spine  She states she was at the beach 15 years ago carrying both of her nieces and lifted them up to avoid a "crazy" wave  Patient is also reporting chronic neck and upper back pain primarily on the L side with intermittent L arm numbness and tingling, for 4 years  Patient reports she began noticing symptoms after sitting and driving for a period of time, and was then diagnosed with spondylosis and arthritis   Patient reports a spot on L outer thigh she gets a deep aching pain  Patient reports she has done PT for both her lower back and neck pain, and would like an evaluation prior to restarting therapy  Discussed options for physiatry with patient as patient reports she has not had imaging or up to date evaluation   RN explained that physiatry involves treating a wide variety of medical conditions affecting the brain, spinal cord, nerves, bones, joints, ligaments, muscles, and tendons  The PA-c would conduct a full exam and if needed, order additional tests / place referrals  Patient agreeable to care plan and verbalized understanding of advice  Patient states after evaluation, if indicated, she would be open to re exploring PT

## 2019-01-25 NOTE — PROGRESS NOTES
Assessment/Plan:      Diagnoses and all orders for this visit:    Chronic right-sided low back pain without sciatica  -     XR spine lumbar minimum 4 views non injury; Future  -     Ambulatory referral to Pain Management; Future    Chronic right-sided low back pain, with sciatica presence unspecified  -     Ambulatory referral to Physical Medicine Rehab    Chronic neck pain  -     Ambulatory referral to Physical Medicine Rehab    Upper back pain on left side  -     Ambulatory referral to Physical Medicine Rehab    Numbness and tingling in left arm  -     Ambulatory referral to Physical Medicine Rehab  -     XR shoulder 2+ vw left; Future  -     XR spine cervical 2 or 3 vw injury; Future    Chronic left shoulder pain    Cervical radiculopathy  -     XR spine cervical 2 or 3 vw injury; Future    Meralgia paresthetica of left side    Other orders  -     celecoxib (CeleBREX) 200 mg capsule;   -     RESTASIS 0 05 % ophthalmic emulsion; Discussion: 47 yo left hand dominant female presenting for chronic neck and LPB  Will order update XR imaging to evaluate for arthritic changes  She states she would like to address LB before any further treatments for her neck  T/C further physical therapy/imaging for neck and left shoulder as well as orthopedic referral for possible left shoulder impingement  Regarding low back I will refer to pain management for possible lumbar facet injections  She has tried numerous round of physical therapy and is very diligent with HEP and she no longer finds relief  She declines new PT referral at this time but will consider in the future after consult with pain management  She is to continue follow up with rheumatology for other large joint OA and continue Celebrex use and heat as needed for pain relief  The patient was advised that NSAID-type medications have two very important potential side effects: gastrointestinal irritation including hemorrhage and renal injuries   She was asked to take the medication with food and to stop if she experiences any GI upset  I asked her to call for vomiting, abdominal pain or black/bloody stools  The patient expresses understanding of these issues and questions were answered  The patient was advised that muscle relaxant medications have very important potential side effects including dry mouth, dizziness, and drowsiness/fatigue  She was asked to stop medication if she experiences any adverse side effects, and additionally advised to avoid alcohol, and driving/operating heavy machinery while taking this medication  The patient expresses understanding of these issues and questions were answered  Subjective:     Patient ID: Nataliia Ndiaye is a 48 y o  female  HPI Referral from Comprehensive Spine program for chronic neck and LBP      LBP>neck pain  Rates average pain as a 4-5/10 on pain scale  LBP x 15 years due to herniated discs due to lifting injury at beach  Left LBP described as a constant achyness with occasional axial 'lightning bolt pain' with particular movements  Left lateral thigh numbness feels like internal bruise  Pain into left buttocks, no pain in legs  No N/T into LE/ feet, denies weakness, B/B incontinence/retention, or saddle anesthesia  + increased pain with Valsalva  Summer months pain is less, but exacerbated in winter  Pain exacerbated with with any sudden movements and lifting  Relieved with lying flat and legs tucked  Finds mornings are most bothersome  Neck pain x 5 years with pain and tingling down left arm  Known stenosis and spondylosis through MRI 5 years ago  PTx with limited  N/T to elbow  Denies any weakness  No N/T in hands  Long standing hx of intermittent physical therapy  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 Background - Initial Assessment  Clinical complaint: Lower back pain on the R side; neck pain traveling down L side causing arm numbness/tingling  Date of onset: 15 years low back pain; neck pain - 4 years  Mechanism of injury: 15 years ago patient was at beach carrying nieces and lifted nieces to avoid a wave and herniated discs in back; neck pain - patient reports arthritis and spondylosis    Previous Treatment - Previous Treatment  Previous evaluation: PCP, PT, orthopedic eval 15 yrs ago through [address] [place] [address]  Current provider: Dr Andi Jimenez: MRI 15 years   Previous treatment: PT for both lower back and neck pain; Rx for metaxalone PRN    Protocols used:  AMB COMPREHENSIVE SPINE PROGRAM PROTOCOL     Patient called in looking to make an appointment with a spine specialist  RN provided patient an overview of the Comprehensive Spine Program including: triage assessment, physical therapy, and additional specialist referral options based on symptoms and chronicity  Research has shown great results when starting physical therapy sooner than later which helps reduce imaging and costs to patients  Patient states she is experiencing a chronic issue and has done multiple rounds of physical therapy in the past  Patient is interested in completing triage assessment, and discussing options      Patient reports chronic low back pain for 15 years, primarily on the R side  Patient reports herniated discs in her lumbar spine  She states she was at the beach 15 years ago carrying both of her nieces and lifted them up to avoid a "crazy" wave   Patient is also reporting chronic neck and upper back pain primarily on the L side with intermittent L arm numbness and tingling, for 4 years  Patient reports she began noticing symptoms after sitting and driving for a period of time, and was then diagnosed with spondylosis and arthritis  Patient reports a spot on L outer thigh she gets a deep aching pain  Patient reports she has done PT for both her lower back and neck pain, and would like an evaluation prior to restarting therapy  Discussed options for physiatry with patient as patient reports she has not had imaging or up to date evaluation   RN explained that physiatry involves treating a wide variety of medical conditions affecting the brain, spinal cord, nerves, bones, joints, ligaments, muscles, and tendons  The PA-c would conduct a full exam and if needed, order additional tests / place referrals  Patient agreeable to care plan and verbalized understanding of advice   Patient states after evaluation, if indicated, she would be open to re exploring PT            PAST MEDICAL HISTORY  Past Medical History:   Diagnosis Date    History of herniated intervertebral disc 10/19/2005     PAST SURGICAL HISTORY  Past Surgical History:   Procedure Laterality Date    APPENDECTOMY       SECTION      FOOT SURGERY      exostosectomy for spur    KNEE SURGERY      TUBAL LIGATION         FAMILY HISTORY  Family History   Problem Relation Age of Onset    Diabetes Mother     Heart disease Mother     Atrial fibrillation Father      HOME MEDICATIONS  Current Outpatient Prescriptions   Medication Sig Dispense Refill    celecoxib (CeleBREX) 200 mg capsule   0    clobetasol (TEMOVATE) 0 05 % cream Apply topically 2 (two) times a day as needed      clonazePAM (KlonoPIN) 0 5 mg tablet Take 1 tablet (0 5 mg total) by mouth 2 (two) times a day as needed for seizures 20 tablet 0    diclofenac sodium (VOLTAREN) 1 % Place on the skin      metaxalone (SKELAXIN) 800 mg tablet take 1 tablet by mouth three times a day if needed 30 tablet 0    omeprazole (PriLOSEC) 20 mg delayed release capsule Take 20 mg by mouth daily  0    RESTASIS 0 05 % ophthalmic emulsion   0    sertraline (ZOLOFT) 100 mg tablet Take 1 tablet (100 mg total) by mouth daily 90 tablet 1    verapamil (VERELAN) 300 MG CP24 Take 1 capsule (300 mg total) by mouth daily 90 capsule 1    aspirin (ASPIRIN LOW DOSE) 81 MG tablet Take 1 tablet by mouth daily       No current facility-administered medications for this visit  ALLERGIES  Bee venom; Meperidine; and Penicillins      SOCIAL HISTORY  History   Alcohol use Not on file     History   Drug use: Unknown     History   Smoking Status    Never Smoker   Smokeless Tobacco    Never Used       Review of Systems   Constitutional: Positive for fatigue  Negative for chills, diaphoresis, fever and unexpected weight change  Respiratory: Negative for shortness of breath  Cardiovascular: Negative for chest pain  Gastrointestinal: Negative for constipation and diarrhea  Genitourinary: Negative for decreased urine volume and difficulty urinating  Musculoskeletal: Positive for arthralgias (known large joint OA), back pain, neck pain and neck stiffness  Negative for gait problem and joint swelling  Skin: Negative for rash  Neurological: Positive for numbness (left arm)  Negative for weakness  Psychiatric/Behavioral: Positive for sleep disturbance  Objective:  Vitals:    01/28/19 1450   BP: 124/90   Pulse: 100        Physical Exam   Constitutional: She is oriented to person, place, and time  She appears well-developed and well-nourished  No distress  HENT:   Head: Normocephalic and atraumatic  Musculoskeletal:        Left shoulder: She exhibits tenderness  She exhibits normal range of motion, no bony tenderness, no effusion, no crepitus, no deformity and no spasm  Cervical back: She exhibits decreased range of motion  She exhibits no tenderness          Lumbar back: She exhibits decreased range of motion, tenderness and bony tenderness  TTP left lumbar facet margins and piriformis     + Mickey's left to LB    Markedly tight hip flexors     + left Empty can test, - Hawking's       Neurological: She is alert and oriented to person, place, and time  She has normal strength  She displays no atrophy  A sensory deficit (hypoasthesia left C6) is present  She exhibits normal muscle tone  Coordination and gait normal    Reflex Scores:       Tricep reflexes are 2+ on the right side and 2+ on the left side  Bicep reflexes are 2+ on the right side and 2+ on the left side  Brachioradialis reflexes are 2+ on the right side and 2+ on the left side  Patellar reflexes are 2+ on the right side and 2+ on the left side  Achilles reflexes are 2+ on the right side and 2+ on the left side   - SLR bilaterally     Able to heel stand, toe stand, knee bend     - Lainez's bilaterally    - Spurling's     + left Tinel's sign    Skin: Skin is warm and dry  No rash noted  She is not diaphoretic  No erythema  Psychiatric: She has a normal mood and affect  Her behavior is normal  Judgment and thought content normal    Vitals reviewed

## 2019-01-28 ENCOUNTER — OFFICE VISIT (OUTPATIENT)
Dept: PAIN MEDICINE | Facility: CLINIC | Age: 54
End: 2019-01-28
Payer: COMMERCIAL

## 2019-01-28 ENCOUNTER — HOSPITAL ENCOUNTER (OUTPATIENT)
Dept: RADIOLOGY | Facility: HOSPITAL | Age: 54
Discharge: HOME | End: 2019-01-28
Payer: COMMERCIAL

## 2019-01-28 VITALS
HEART RATE: 100 BPM | HEIGHT: 65 IN | SYSTOLIC BLOOD PRESSURE: 124 MMHG | DIASTOLIC BLOOD PRESSURE: 90 MMHG | BODY MASS INDEX: 38.49 KG/M2 | WEIGHT: 231 LBS

## 2019-01-28 DIAGNOSIS — G57.12 MERALGIA PARESTHETICA OF LEFT SIDE: ICD-10-CM

## 2019-01-28 DIAGNOSIS — R20.0 NUMBNESS AND TINGLING IN LEFT ARM: ICD-10-CM

## 2019-01-28 DIAGNOSIS — M54.5 CHRONIC RIGHT-SIDED LOW BACK PAIN, WITH SCIATICA PRESENCE UNSPECIFIED: ICD-10-CM

## 2019-01-28 DIAGNOSIS — M54.50 CHRONIC RIGHT-SIDED LOW BACK PAIN WITHOUT SCIATICA: ICD-10-CM

## 2019-01-28 DIAGNOSIS — R20.2 NUMBNESS AND TINGLING IN LEFT ARM: ICD-10-CM

## 2019-01-28 DIAGNOSIS — G89.29 CHRONIC LEFT SHOULDER PAIN: ICD-10-CM

## 2019-01-28 DIAGNOSIS — G89.29 CHRONIC NECK PAIN: ICD-10-CM

## 2019-01-28 DIAGNOSIS — M54.9 UPPER BACK PAIN ON LEFT SIDE: ICD-10-CM

## 2019-01-28 DIAGNOSIS — M54.12 CERVICAL RADICULOPATHY: ICD-10-CM

## 2019-01-28 DIAGNOSIS — G89.29 CHRONIC RIGHT-SIDED LOW BACK PAIN WITHOUT SCIATICA: ICD-10-CM

## 2019-01-28 DIAGNOSIS — M25.512 CHRONIC LEFT SHOULDER PAIN: ICD-10-CM

## 2019-01-28 DIAGNOSIS — G89.29 CHRONIC RIGHT-SIDED LOW BACK PAIN WITHOUT SCIATICA: Primary | ICD-10-CM

## 2019-01-28 DIAGNOSIS — G89.29 CHRONIC RIGHT-SIDED LOW BACK PAIN, WITH SCIATICA PRESENCE UNSPECIFIED: ICD-10-CM

## 2019-01-28 DIAGNOSIS — M54.50 CHRONIC RIGHT-SIDED LOW BACK PAIN WITHOUT SCIATICA: Primary | ICD-10-CM

## 2019-01-28 DIAGNOSIS — M54.2 CHRONIC NECK PAIN: ICD-10-CM

## 2019-01-28 PROCEDURE — 72040 X-RAY EXAM NECK SPINE 2-3 VW: CPT

## 2019-01-28 PROCEDURE — 72110 X-RAY EXAM L-2 SPINE 4/>VWS: CPT

## 2019-01-28 PROCEDURE — 73030 X-RAY EXAM OF SHOULDER: CPT

## 2019-01-28 PROCEDURE — 99204 OFFICE O/P NEW MOD 45 MIN: CPT | Performed by: PHYSICIAN ASSISTANT

## 2019-01-28 RX ORDER — CYCLOSPORINE 0.5 MG/ML
EMULSION OPHTHALMIC EVERY 12 HOURS
Refills: 0 | COMMUNITY
Start: 2018-12-31

## 2019-01-28 RX ORDER — CELECOXIB 200 MG/1
CAPSULE ORAL
Refills: 0 | COMMUNITY
Start: 2018-12-29 | End: 2020-09-18 | Stop reason: ALTCHOICE

## 2019-01-28 NOTE — PATIENT INSTRUCTIONS
1  XRs, will call with abnormal results  2  Referral to pain management for possible facet injections   3   To consider additional physical therapy for neck and shoulder

## 2019-02-20 ENCOUNTER — CONSULT (OUTPATIENT)
Dept: PAIN MEDICINE | Facility: CLINIC | Age: 54
End: 2019-02-20
Payer: COMMERCIAL

## 2019-02-20 VITALS
HEART RATE: 84 BPM | WEIGHT: 231 LBS | SYSTOLIC BLOOD PRESSURE: 132 MMHG | DIASTOLIC BLOOD PRESSURE: 76 MMHG | BODY MASS INDEX: 38.44 KG/M2

## 2019-02-20 DIAGNOSIS — M54.12 CERVICAL RADICULOPATHY: Primary | ICD-10-CM

## 2019-02-20 DIAGNOSIS — G89.29 CHRONIC RIGHT-SIDED LOW BACK PAIN WITHOUT SCIATICA: ICD-10-CM

## 2019-02-20 DIAGNOSIS — M54.16 LUMBAR RADICULOPATHY: ICD-10-CM

## 2019-02-20 DIAGNOSIS — M54.50 CHRONIC RIGHT-SIDED LOW BACK PAIN WITHOUT SCIATICA: ICD-10-CM

## 2019-02-20 DIAGNOSIS — M47.22 OSTEOARTHRITIS OF SPINE WITH RADICULOPATHY, CERVICAL REGION: ICD-10-CM

## 2019-02-20 PROCEDURE — 99244 OFF/OP CNSLTJ NEW/EST MOD 40: CPT | Performed by: ANESTHESIOLOGY
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
## 2019-03-02 ENCOUNTER — HOSPITAL ENCOUNTER (OUTPATIENT)
Dept: RADIOLOGY | Facility: HOSPITAL | Age: 54
Discharge: HOME | End: 2019-03-02
Payer: COMMERCIAL

## 2019-03-02 DIAGNOSIS — M47.22 OSTEOARTHRITIS OF SPINE WITH RADICULOPATHY, CERVICAL REGION: ICD-10-CM

## 2019-03-02 DIAGNOSIS — M54.16 LUMBAR RADICULOPATHY: ICD-10-CM

## 2019-03-02 DIAGNOSIS — M54.12 CERVICAL RADICULOPATHY: ICD-10-CM

## 2019-03-02 PROCEDURE — 72141 MRI NECK SPINE W/O DYE: CPT

## 2019-03-02 PROCEDURE — 72148 MRI LUMBAR SPINE W/O DYE: CPT

## 2019-03-05 ENCOUNTER — TELEPHONE (OUTPATIENT)
Dept: PAIN MEDICINE | Facility: MEDICAL CENTER | Age: 54
End: 2019-03-05

## 2019-03-05 DIAGNOSIS — M51.16 INTERVERTEBRAL DISC DISORDERS WITH RADICULOPATHY, LUMBAR REGION: Primary | ICD-10-CM

## 2019-03-06 ENCOUNTER — TELEPHONE (OUTPATIENT)
Dept: RADIOLOGY | Facility: CLINIC | Age: 54
End: 2019-03-06

## 2019-03-06 NOTE — TELEPHONE ENCOUNTER
Called patient to schedule her procedure and she was very upset that she had to be scheduled in April  I did explain that if there was any cancellations I would try to get her in  She wanted to know if there was anything in the meantime that you could prescribe for her  Also her insurance is a real stickler for authorizations, meaning when I get the auth I have to put the date of the procedure in and they don't like to budge if we try to move a patient from that date  They want you to withdrawal the request and start a new one  Please call her at [phone]  I also made her a follow up with you to discuss her MRI's

## 2019-03-06 NOTE — TELEPHONE ENCOUNTER
I called the patient back and left messages on her home and mobile phone numbers  We can discuss medication options at her next office visit on 03/12/2019 or I can start her on gabapentin to help with her pain and symptoms in the interim  The most common side effects are   Dizziness, drowsiness,   Possible swelling of the hands and feet  If she is agreeable I can sent a prescription to her pharmacy

## 2019-03-07 NOTE — TELEPHONE ENCOUNTER
Left vm for pt on number provided of Annie's rec as stated in her previous task  Pt to c/b with her decision  C/B # and OH provided

## 2019-03-07 NOTE — TELEPHONE ENCOUNTER
Pt advised of LEONG's rec and she said she actually has gabapentin 300 mg capsules and gabapentin 600 mg tablets that her foot doctor prescribed for neuropathy and foot pain  The 300 mg BID dosing wasn't helping so the foot doctor inc to the 600 mg tabs BID but that made her dizzy  I placed pt on hold and discussed with DANG [facility] rec starting with gabapentin 300 mg (1) in afternoon and (2) at bedtime  Pt informed of the same and told that she can give update to DANG at  on 3/12/19

## 2019-03-12 ENCOUNTER — OFFICE VISIT (OUTPATIENT)
Dept: PAIN MEDICINE | Facility: CLINIC | Age: 54
End: 2019-03-12
Payer: COMMERCIAL

## 2019-03-12 VITALS
HEART RATE: 100 BPM | RESPIRATION RATE: 16 BRPM | WEIGHT: 236 LBS | HEIGHT: 65 IN | DIASTOLIC BLOOD PRESSURE: 86 MMHG | SYSTOLIC BLOOD PRESSURE: 132 MMHG | BODY MASS INDEX: 39.32 KG/M2

## 2019-03-12 DIAGNOSIS — M48.02 CERVICAL SPINAL STENOSIS: ICD-10-CM

## 2019-03-12 DIAGNOSIS — M50.120 CERVICAL DISC DISORDER WITH RADICULOPATHY OF MID-CERVICAL REGION: ICD-10-CM

## 2019-03-12 DIAGNOSIS — M51.16 LUMBAR DISC DISEASE WITH RADICULOPATHY: ICD-10-CM

## 2019-03-12 DIAGNOSIS — M51.16 INTERVERTEBRAL DISC DISORDERS WITH RADICULOPATHY, LUMBAR REGION: ICD-10-CM

## 2019-03-12 DIAGNOSIS — M54.16 LUMBAR RADICULOPATHY: Primary | ICD-10-CM

## 2019-03-12 PROCEDURE — 99214 OFFICE O/P EST MOD 30 MIN: CPT | Performed by: NURSE PRACTITIONER

## 2019-03-12 RX ORDER — GABAPENTIN 300 MG/1
CAPSULE ORAL
Qty: 90 CAPSULE | Refills: 2 | Status: SHIPPED | OUTPATIENT
Start: 2019-03-12 | End: 2019-05-09 | Stop reason: SINTOL

## 2019-03-12 RX ORDER — GABAPENTIN 300 MG/1
100 CAPSULE ORAL 3 TIMES DAILY
COMMUNITY
End: 2019-03-12

## 2019-03-12 NOTE — PROGRESS NOTES
Pt c/o left shoulder pain that radiates to the arm and lower back pain    Assessment:  1  Lumbar radiculopathy    2  Cervical spinal stenosis    3  Cervical disc disorder with radiculopathy of mid-cervical region    4  Intervertebral disc disorders with radiculopathy, lumbar region    5  Lumbar disc disease with radiculopathy        Plan:  Alyx Frances is a 47 y o  female with a history of cervical radiculopathy, lumbar radiculopathy and cervical spondylosis  The patient presents today with ongoing neck and low back pain, which has slightly improved since last office  Her lumbar and cervical MRI were reviewed with her in the office today  She was also called last week with the results of her cervical and lumbar MRI and was ordered to undergo a left L5-S1 transforaminal epidural steroid injection  Her lumbar MRI showed a disc bulge at L5-S1 as well as spondylosis which correlates for left L5-S1 radiculopathy  She was again during her office visit today educated on the risks of this procedure and verbalized understanding  She also reports left-sided neck pain that radiates into her left shoulder and left biceps muscles  He most recent cervical MRI showed multilevel disc bulges, with a left paracentral disc protrusion at C5-C6  I discussed with her about proceeding with a cervical epidural steroid injection to help with her pain and symptoms  She was educated on the procedures most common risks and was given a brochure on the procedure  I instructed her that we can proceed with a neck injection after the completion of her left L5-S1 transforaminal injection  She verbalized an understanding  The patient will continue on gabapentin 300 mg 1 capsule in the afternoon and 2 capsules at bedtime  A refill for this medication was sent electronically to the patient's pharmacy on file  Complete risks and benefits including bleeding, infection, tissue reaction, nerve injury and allergic reaction were discussed  The approach was demonstrated using models and literature was provided  The patient will follow up after the completion of her left L5-S1 transforaminal epidural steroid injection, or sooner with the worsening of symptoms  My impressions and treatment recommendations were discussed in detail with the patient who verbalized understanding and had no further questions  Discharge instructions were provided  I personally saw and examined the patient and I agree with the above discussed plan of care  No orders of the defined types were placed in this encounter  New Medications Ordered This Visit   Medications    gabapentin (NEURONTIN) 300 mg capsule     Sig: Take 1 capsule in the afternoon and 2 capsules at bedtime  Dispense:  90 capsule     Refill:  2       History of Present Illness:  Marilyn Byrd is a 47 y o  female with a history of cervical radiculopathy, lumbar radiculopathy and cervical spondylosis  The patient was last seen office on 2/20/2019 where she was ordered an MRI of her lumbar spine and cervical spine for further evaluation  She presents for a follow up office visit in regards to Arm Pain; Shoulder Pain (radiates to the left arm); and Back Pain (lower)  The patients current symptoms include low back pain that starts in the left side of her low back and radiates down the lateral aspect of her left leg to her knee  She also complains of left neck pain that radiates into her left shoulder and down into her left biceps  She denies bilateral leg and bilateral arm weakness  She describes her pain as sharp, shooting, numbness pain that is constant nature  The patient reports that her pain is symptoms have slightly improved since last office visit  She currently rates her pain a 4/10 numeric pain scale  Current pain medications includes:  Gabapentin 300 mg 1 capsule in the afternoon and 2 capsules at bedtime     The patient reports that this regimen is providing 20 % pain relief  The patient is reporting no side effects from this pain medication regimen  I have personally reviewed and/or updated the patient's past medical history, past surgical history, family history, social history, current medications, allergies, and vital signs today  Review of Systems   Respiratory: Negative for shortness of breath  Cardiovascular: Negative for chest pain  Gastrointestinal: Negative for constipation, diarrhea, nausea and vomiting  Musculoskeletal: Negative for arthralgias, gait problem, joint swelling and myalgias  Decreased ROM   Skin: Negative for rash  Neurological: Positive for dizziness  Negative for seizures and weakness  All other systems reviewed and are negative        Patient Active Problem List   Diagnosis    Plantar fasciitis    Acquired deformity of right foot    Arthralgia of right foot    Hallux valgus of right foot    Radiculopathy of lumbar region    Congenital pes planus    Tarsal tunnel syndrome, right    Right foot pain    Psoriasis    PMB (postmenopausal bleeding)    Overactive bladder    Lichen sclerosus of female genitalia    Irritable bowel syndrome    Impaired fasting glucose    Allergic rhinitis due to pollen    Generalized anxiety disorder    Benign essential hypertension    Meralgia paresthetica of left side       Past Medical History:   Diagnosis Date    History of herniated intervertebral disc 10/19/2005       Past Surgical History:   Procedure Laterality Date    APPENDECTOMY       SECTION      FOOT SURGERY      exostosectomy for spur    KNEE SURGERY      TUBAL LIGATION         Family History   Problem Relation Age of Onset    Diabetes Mother     Heart disease Mother     Atrial fibrillation Father        Social History     Occupational History    Not on file   Tobacco Use    Smoking status: Never Smoker    Smokeless tobacco: Never Used   Substance and Sexual Activity    Alcohol use: Not on file    Drug use: Not on file    Sexual activity: Not on file       Current Outpatient Medications on File Prior to Visit   Medication Sig    aspirin (ASPIRIN LOW DOSE) 81 MG tablet Take 1 tablet by mouth daily    celecoxib (CeleBREX) 200 mg capsule     clobetasol (TEMOVATE) 0 05 % cream Apply topically 2 (two) times a day as needed    clonazePAM (KlonoPIN) 0 5 mg tablet Take 1 tablet (0 5 mg total) by mouth 2 (two) times a day as needed for seizures    diclofenac sodium (VOLTAREN) 1 % Place on the skin    metaxalone (SKELAXIN) 800 mg tablet take 1 tablet by mouth three times a day if needed    omeprazole (PriLOSEC) 20 mg delayed release capsule Take 20 mg by mouth daily    RESTASIS 0 05 % ophthalmic emulsion     sertraline (ZOLOFT) 100 mg tablet Take 1 tablet (100 mg total) by mouth daily    verapamil (VERELAN) 300 MG CP24 Take 1 capsule (300 mg total) by mouth daily     No current facility-administered medications on file prior to visit  Allergies   Allergen Reactions    Bee Venom Anaphylaxis     Reaction Date: [id number];   Reaction Date: 23Oct2006;     Meperidine GI Intolerance     DEMEROL vomiting  DEMEROL vomiting    Penicillins      Reaction Date: 25Jul2005; Annotation - [id number]: as infant - throat closes, difficult breathing       Physical Exam:    /86 (BP Location: Right arm, Patient Position: Sitting, Cuff Size: Standard)   Pulse 100   Resp 16   Ht 5' 5" (1 651 m)   Wt 107 kg (236 lb)   BMI 39 27 kg/m²     Constitutional:normal, well developed, well nourished, alert, in no distress and non-toxic and no overt pain behavior   and obese  Eyes:anicteric  HEENT:grossly intact  Neck:supple, symmetric, trachea midline and no masses   Pulmonary:even and unlabored  Cardiovascular:No edema or pitting edema present  Skin:Normal without rashes or lesions and well hydrated  Psychiatric:Mood and affect appropriate  Neurologic:Cranial Nerves II-XII grossly intact  Musculoskeletal:normal     Lumbar Spine Exam    Appearance:  Normal lordosis  Palpation/Tenderness:  left lumbar paraspinal tenderness  Sensory:  no sensory deficits noted  Range of Motion:  Flexion:  Minimally limited  with pain  Extension:  Minimally limited  with pain  Lateral Flexion - Left:  Minimally limited  with pain  Lateral Flexion - Right:  Minimally limited  with pain  Rotation - Left:  Minimally limited  with pain  Rotation - Right:  Minimally limited  with pain  Motor Strength:  Left hip flexion:  5/5  Right hip flexion:  5/5  Left knee extension:  5/5  Right knee extension:  5/5  Left foot dorsiflexion:  5/5  Left foot plantar flexion:  5/5  Right foot dorsiflexion:  5/5  Right foot plantar flexion:  5/5      Cervical Spine Exam    Appearance:  Normal lordosis  Palpation/Tenderness:  left cervical paraspinal tenderness  left trapezium tenderness  Sensory:  no sensory deficits noted  Range of Motion:  Flexion:  Minimally limited  with pain  Extension:  Minimally limited  with pain  Lateral Flexion - Left:  Minimally limited  with pain  Lateral Flexion - Right:  Minimally limited  with pain  Rotation - Left:  Minimally limited  with pain  Rotation - Right:  Minimally limited  with pain  Motor Strength:  Left Arm Flexion  5/5  Left Arm Extension  5/5  Right Arm Flexion  5/5  Right Arm Extension  5/5  Left Wrist Flexion  5/5  Left Wrist Extension  5/5  Left Finger Abduction  5/5  Right Finger Abduction  5/5  Left    5/5  Right   5/5    Imaging    MRI LUMBAR SPINE WITHOUT CONTRAST     INDICATION: M54 16: Radiculopathy, lumbar region      COMPARISON:  MRI lumbar spine study of March 23, 2012      TECHNIQUE:  Sagittal T1, sagittal T2, sagittal inversion recovery, axial T1 and axial T2, coronal T2        IMAGE QUALITY:  Diagnostic     FINDINGS:     VERTEBRAL BODIES:  Mild levoscoliosis of the lumbar spine with preservation of vertebral body height and marrow signal   There is no focal suspicious marrow signal abnormality or evidence of marrow edema  A 1 2 cm hemangioma is identified within the   left L1 vertebral body      SACRUM:  3 mm hemangioma within the S2 vertebral body  No marrow edema  Incidentally noted Tarlov cysts at the S2 level      DISTAL CORD AND CONUS:  Normal size and signal within the distal cord and conus  The cauda equina is unremarkable in signal intensity in the lumbar spine         PARASPINAL SOFT TISSUES:  Paraspinal soft tissues are unremarkable      LOWER THORACIC DISC SPACES:  Normal disc height and signal   No disc herniation, canal stenosis or foraminal narrowing      LUMBAR DISC SPACES:  Mild disc desiccation in the lower lumbar spine with mild disc height loss at L5-S1      L1-L2:  Minimal bulging of the disc annulus, posteriorly contained by the PLL  No spinal canal or foraminal stenosis      L2-L3:  Normal      L3-L4:  Minimal bulging of the disc annulus, posteriorly contained by the PLL  No spinal canal or foraminal stenosis      L4-L5:  Minimal bulging of the disc annulus with mild bilateral facet arthropathy  No spinal canal or foraminal stenosis      L5-S1:  Disc bulge, slightly eccentric to the left with left far lateral osteophytic ridging  There is mild left-sided facet arthropathy as well  The findings result in left greater than right subarticular recess stenosis, correlate for left S1   radiculopathy  There is also mild to moderate left foraminal stenosis       IMPRESSION:     Lower lumbar degenerative discogenic disease, most notably at L5-S1 where there is encroachment of the left subarticular recess and foramen; correlate for left S1 and L5 radiculopathy               MRI CERVICAL SPINE WITHOUT CONTRAST     INDICATION: M54 12: Radiculopathy, cervical region  M47 22:  Other spondylosis with radiculopathy, cervical region      COMPARISON:  3/27/2012     TECHNIQUE:  Sagittal T1, sagittal T2, sagittal inversion recovery, axial T2, axial  2D merge     IMAGE QUALITY: Diagnostic     FINDINGS:     ALIGNMENT:  Normal alignment of the cervical spine  No compression fracture  No subluxation  No scoliosis      MARROW SIGNAL:  Normal marrow signal is identified within the visualized bony structures  No discrete marrow lesion      CERVICAL AND VISUALIZED THORACIC CORD:  Normal signal within the visualized cord      PREVERTEBRAL AND PARASPINAL SOFT TISSUES:  Normal      VISUALIZED POSTERIOR FOSSA:  The visualized posterior fossa demonstrates no abnormal signal      CERVICAL DISC SPACES:     C2-C3:  Normal      C3-C4:  Normal      C4-C5:  Mild annular bulging and bilateral uncinate joint hypertrophic degenerative change  Mild canal stenosis with partial effacement of the CSF space but no cord compression  There is moderate bilateral foraminal narrowing      C5-C6:  Annular bulging with a broad-based left paracentral disc protrusion and right greater than left uncinate joint hypertrophic osteophyte formation  The left paracentral disc protrusion has slightly decreased in size since the prior examination  Moderate canal stenosis with partial effacement of the ventral and dorsal CSF space  Moderately severe right greater than left foraminal narrowing which is more pronounced than the prior study      C6-C7:  Mild annular bulging  No disc herniation  Minimal canal stenosis  Minimal left foraminal narrowing      C7-T1:  Normal disc height and signal   Slight facet arthropathy  No canal stenosis or foraminal narrowing  Incidentally noted small root sleeve cysts      UPPER THORACIC DISC SPACES:  Normal      IMPRESSION:     Annular bulging and uncinate joint hypertrophic change C4-5, C5-6 and C6-7  At the C5-6 level there is also a small left paracentral disc protrusion  Moderate canal stenosis and moderate to severe bilateral foraminal narrowing at the C4-5 and C5-6   levels    Foraminal narrowing has progressed since the prior examination

## 2019-04-04 ENCOUNTER — HOSPITAL ENCOUNTER (OUTPATIENT)
Dept: RADIOLOGY | Facility: CLINIC | Age: 54
Discharge: HOME | End: 2019-04-04
Attending: ANESTHESIOLOGY | Admitting: ANESTHESIOLOGY
Payer: COMMERCIAL

## 2019-04-04 VITALS
SYSTOLIC BLOOD PRESSURE: 123 MMHG | TEMPERATURE: 98.7 F | DIASTOLIC BLOOD PRESSURE: 86 MMHG | RESPIRATION RATE: 20 BRPM | OXYGEN SATURATION: 95 % | HEART RATE: 100 BPM

## 2019-04-04 DIAGNOSIS — M51.16 INTERVERTEBRAL DISC DISORDERS WITH RADICULOPATHY, LUMBAR REGION: ICD-10-CM

## 2019-04-04 PROCEDURE — 64484 NJX AA&/STRD TFRM EPI L/S EA: CPT | Performed by: ANESTHESIOLOGY

## 2019-04-04 PROCEDURE — 64483 NJX AA&/STRD TFRM EPI L/S 1: CPT | Performed by: ANESTHESIOLOGY

## 2019-04-04 RX ORDER — METHYLPREDNISOLONE ACETATE 80 MG/ML
80 INJECTION, SUSPENSION INTRA-ARTICULAR; INTRALESIONAL; INTRAMUSCULAR; PARENTERAL; SOFT TISSUE ONCE
Status: COMPLETED | OUTPATIENT
Start: 2019-04-04 | End: 2019-04-04

## 2019-04-04 RX ORDER — 0.9 % SODIUM CHLORIDE 0.9 %
10 VIAL (ML) INJECTION ONCE
Status: COMPLETED | OUTPATIENT
Start: 2019-04-04 | End: 2019-04-04

## 2019-04-04 RX ORDER — BUPIVACAINE HCL/PF 2.5 MG/ML
10 VIAL (ML) INJECTION ONCE
Status: COMPLETED | OUTPATIENT
Start: 2019-04-04 | End: 2019-04-04

## 2019-04-04 RX ADMIN — IOHEXOL 1 ML: 300 INJECTION, SOLUTION INTRAVENOUS at 11:38

## 2019-04-04 RX ADMIN — METHYLPREDNISOLONE ACETATE 80 MG: 80 INJECTION, SUSPENSION INTRA-ARTICULAR; INTRALESIONAL; INTRAMUSCULAR; PARENTERAL; SOFT TISSUE at 11:38

## 2019-04-04 RX ADMIN — Medication 5 ML: at 11:36

## 2019-04-04 RX ADMIN — BUPIVACAINE HYDROCHLORIDE 2 ML: 2.5 INJECTION, SOLUTION EPIDURAL; INFILTRATION; INTRACAUDAL at 11:38

## 2019-04-04 RX ADMIN — SODIUM CHLORIDE 5 ML: 9 INJECTION, SOLUTION INTRAMUSCULAR; INTRAVENOUS; SUBCUTANEOUS at 11:36

## 2019-04-11 ENCOUNTER — TELEPHONE (OUTPATIENT)
Dept: PAIN MEDICINE | Facility: CLINIC | Age: 54
End: 2019-04-11

## 2019-05-09 ENCOUNTER — HOSPITAL ENCOUNTER (OUTPATIENT)
Dept: RADIOLOGY | Facility: CLINIC | Age: 54
Discharge: HOME | End: 2019-05-09
Attending: ANESTHESIOLOGY | Admitting: ANESTHESIOLOGY
Payer: COMMERCIAL

## 2019-05-09 VITALS
OXYGEN SATURATION: 96 % | DIASTOLIC BLOOD PRESSURE: 67 MMHG | HEART RATE: 94 BPM | RESPIRATION RATE: 20 BRPM | TEMPERATURE: 99.3 F | SYSTOLIC BLOOD PRESSURE: 137 MMHG

## 2019-05-09 DIAGNOSIS — M51.16 INTERVERTEBRAL DISC DISORDER WITH RADICULOPATHY OF LUMBAR REGION: ICD-10-CM

## 2019-05-09 PROCEDURE — 64483 NJX AA&/STRD TFRM EPI L/S 1: CPT | Performed by: ANESTHESIOLOGY

## 2019-05-09 PROCEDURE — 64484 NJX AA&/STRD TFRM EPI L/S EA: CPT | Performed by: ANESTHESIOLOGY

## 2019-05-09 RX ORDER — METHYLPREDNISOLONE ACETATE 80 MG/ML
80 INJECTION, SUSPENSION INTRA-ARTICULAR; INTRALESIONAL; INTRAMUSCULAR; PARENTERAL; SOFT TISSUE ONCE
Status: COMPLETED | OUTPATIENT
Start: 2019-05-09 | End: 2019-05-09

## 2019-05-09 RX ORDER — BUPIVACAINE HCL/PF 2.5 MG/ML
10 VIAL (ML) INJECTION ONCE
Status: COMPLETED | OUTPATIENT
Start: 2019-05-09 | End: 2019-05-09

## 2019-05-09 RX ORDER — 0.9 % SODIUM CHLORIDE 0.9 %
10 VIAL (ML) INJECTION ONCE
Status: COMPLETED | OUTPATIENT
Start: 2019-05-09 | End: 2019-05-09

## 2019-05-09 RX ADMIN — METHYLPREDNISOLONE ACETATE 80 MG: 80 INJECTION, SUSPENSION INTRA-ARTICULAR; INTRALESIONAL; INTRAMUSCULAR; PARENTERAL; SOFT TISSUE at 11:33

## 2019-05-09 RX ADMIN — SODIUM CHLORIDE 5 ML: 9 INJECTION, SOLUTION INTRAMUSCULAR; INTRAVENOUS; SUBCUTANEOUS at 11:30

## 2019-05-09 RX ADMIN — IOHEXOL 1 ML: 300 INJECTION, SOLUTION INTRAVENOUS at 11:32

## 2019-05-09 RX ADMIN — BUPIVACAINE HYDROCHLORIDE 2 ML: 2.5 INJECTION, SOLUTION EPIDURAL; INFILTRATION; INTRACAUDAL at 11:33

## 2019-05-09 RX ADMIN — Medication 5 ML: at 11:30

## 2019-05-16 ENCOUNTER — TELEPHONE (OUTPATIENT)
Dept: PAIN MEDICINE | Facility: CLINIC | Age: 54
End: 2019-05-16

## 2019-05-16 DIAGNOSIS — M51.17 INTERVERTEBRAL DISC DISORDER WITH RADICULOPATHY OF LUMBOSACRAL REGION: Primary | ICD-10-CM

## 2019-05-29 ENCOUNTER — OFFICE VISIT (OUTPATIENT)
Dept: OBGYN CLINIC | Facility: CLINIC | Age: 54
End: 2019-05-29
Payer: COMMERCIAL

## 2019-05-29 VITALS
HEART RATE: 108 BPM | HEIGHT: 65 IN | DIASTOLIC BLOOD PRESSURE: 86 MMHG | WEIGHT: 235 LBS | BODY MASS INDEX: 39.15 KG/M2 | SYSTOLIC BLOOD PRESSURE: 125 MMHG

## 2019-05-29 DIAGNOSIS — M54.16 RADICULOPATHY OF LUMBAR REGION: Primary | ICD-10-CM

## 2019-05-29 DIAGNOSIS — M51.17 INTERVERTEBRAL DISC DISORDER WITH RADICULOPATHY OF LUMBOSACRAL REGION: ICD-10-CM

## 2019-05-29 PROCEDURE — 99204 OFFICE O/P NEW MOD 45 MIN: CPT | Performed by: ORTHOPAEDIC SURGERY

## 2019-06-10 ENCOUNTER — HOSPITAL ENCOUNTER (OUTPATIENT)
Dept: RADIOLOGY | Facility: HOSPITAL | Age: 54
Discharge: HOME | End: 2019-06-10
Payer: COMMERCIAL

## 2019-06-10 VITALS — BODY MASS INDEX: 36.16 KG/M2 | HEIGHT: 66 IN | WEIGHT: 225 LBS

## 2019-06-10 DIAGNOSIS — Z12.31 VISIT FOR SCREENING MAMMOGRAM: ICD-10-CM

## 2019-06-10 PROCEDURE — 77067 SCR MAMMO BI INCL CAD: CPT

## 2019-06-10 PROCEDURE — 77063 BREAST TOMOSYNTHESIS BI: CPT

## 2019-06-13 LAB
ALBUMIN SERPL-MCNC: 4.4 G/DL (ref 3.5–5.5)
ALBUMIN/GLOB SERPL: 1.4 {RATIO} (ref 1.2–2.2)
ALP SERPL-CCNC: 91 IU/L (ref 39–117)
ALT SERPL-CCNC: 17 IU/L (ref 0–32)
AST SERPL-CCNC: 17 IU/L (ref 0–40)
BASOPHILS # BLD AUTO: 0 X10E3/UL (ref 0–0.2)
BASOPHILS NFR BLD AUTO: 1 %
BILIRUB SERPL-MCNC: 0.4 MG/DL (ref 0–1.2)
BUN SERPL-MCNC: 15 MG/DL (ref 6–24)
BUN/CREAT SERPL: 14 (ref 9–23)
CALCIUM SERPL-MCNC: 9.3 MG/DL (ref 8.7–10.2)
CHLORIDE SERPL-SCNC: 103 MMOL/L (ref 96–106)
CHOLEST SERPL-MCNC: 204 MG/DL (ref 100–199)
CO2 SERPL-SCNC: 21 MMOL/L (ref 20–29)
CREAT SERPL-MCNC: 1.08 MG/DL (ref 0.57–1)
EOSINOPHIL # BLD AUTO: 0.3 X10E3/UL (ref 0–0.4)
EOSINOPHIL NFR BLD AUTO: 4 %
ERYTHROCYTE [DISTWIDTH] IN BLOOD BY AUTOMATED COUNT: 15.4 % (ref 12.3–15.4)
GLOBULIN SER-MCNC: 3.2 G/DL (ref 1.5–4.5)
GLUCOSE SERPL-MCNC: 98 MG/DL (ref 65–99)
HBA1C MFR BLD: 5.6 % (ref 4.8–5.6)
HCT VFR BLD AUTO: 40.1 % (ref 34–46.6)
HDLC SERPL-MCNC: 55 MG/DL
HGB BLD-MCNC: 13.7 G/DL (ref 11.1–15.9)
IMM GRANULOCYTES # BLD: 0 X10E3/UL (ref 0–0.1)
IMM GRANULOCYTES NFR BLD: 0 %
LABCORP COMMENT: NORMAL
LDLC SERPL CALC-MCNC: 125 MG/DL (ref 0–99)
LYMPHOCYTES # BLD AUTO: 2 X10E3/UL (ref 0.7–3.1)
LYMPHOCYTES NFR BLD AUTO: 32 %
MCH RBC QN AUTO: 27.6 PG (ref 26.6–33)
MCHC RBC AUTO-ENTMCNC: 34.2 G/DL (ref 31.5–35.7)
MCV RBC AUTO: 81 FL (ref 79–97)
MICRODELETION SYND BLD/T FISH: NORMAL
MICRODELETION SYND BLD/T FISH: NORMAL
MONOCYTES # BLD AUTO: 0.4 X10E3/UL (ref 0.1–0.9)
MONOCYTES NFR BLD AUTO: 7 %
NEUTROPHILS # BLD AUTO: 3.6 X10E3/UL (ref 1.4–7)
NEUTROPHILS NFR BLD AUTO: 56 %
PLATELET # BLD AUTO: 324 X10E3/UL (ref 150–450)
POTASSIUM SERPL-SCNC: 4.4 MMOL/L (ref 3.5–5.2)
PROT SERPL-MCNC: 7.6 G/DL (ref 6–8.5)
RBC # BLD AUTO: 4.96 X10E6/UL (ref 3.77–5.28)
SL AMB EGFR AFRICAN AMERICAN: 67 ML/MIN/1.73
SL AMB EGFR NON AFRICAN AMERICAN: 58 ML/MIN/1.73
SL AMB PDF IMAGE: NORMAL
SODIUM SERPL-SCNC: 139 MMOL/L (ref 134–144)
TRIGL SERPL-MCNC: 119 MG/DL (ref 0–149)
WBC # BLD AUTO: 6.3 X10E3/UL (ref 3.4–10.8)

## 2019-07-15 ENCOUNTER — OFFICE VISIT (OUTPATIENT)
Dept: FAMILY MEDICINE CLINIC | Facility: CLINIC | Age: 54
End: 2019-07-15
Payer: COMMERCIAL

## 2019-07-15 VITALS
WEIGHT: 234.2 LBS | HEIGHT: 66 IN | TEMPERATURE: 98.3 F | RESPIRATION RATE: 18 BRPM | SYSTOLIC BLOOD PRESSURE: 128 MMHG | BODY MASS INDEX: 37.64 KG/M2 | HEART RATE: 96 BPM | DIASTOLIC BLOOD PRESSURE: 86 MMHG

## 2019-07-15 DIAGNOSIS — R73.01 IMPAIRED FASTING GLUCOSE: ICD-10-CM

## 2019-07-15 DIAGNOSIS — Z23 NEED FOR VACCINATION: ICD-10-CM

## 2019-07-15 DIAGNOSIS — I10 BENIGN ESSENTIAL HYPERTENSION: ICD-10-CM

## 2019-07-15 DIAGNOSIS — Z00.00 ANNUAL PHYSICAL EXAM: Primary | ICD-10-CM

## 2019-07-15 PROCEDURE — 90715 TDAP VACCINE 7 YRS/> IM: CPT

## 2019-07-15 PROCEDURE — 99396 PREV VISIT EST AGE 40-64: CPT | Performed by: FAMILY MEDICINE

## 2019-07-15 PROCEDURE — 90471 IMMUNIZATION ADMIN: CPT

## 2019-07-15 NOTE — PROGRESS NOTES
FAMILY PRACTICE HEALTH MAINTENANCE OFFICE VISIT  [facility]    NAME: Idalmis Luna  AGE: 47 y o  SEX: female  : 1965     DATE: 7/15/2019    Assessment and Plan     Problem List Items Addressed This Visit     Benign essential hypertension     Well controlled          Relevant Orders    CBC    Comprehensive metabolic panel    Lipid Panel with Direct LDL reflex    TSH, 3rd generation    Impaired fasting glucose    Relevant Orders    Hemoglobin A1C      Other Visit Diagnoses     Annual physical exam    -  Primary    Need for vaccination        Relevant Orders    TDAP VACCINE GREATER THAN OR EQUAL TO 8YO IM (Completed)            · Patient Counseling:   · Nutrition: Stressed importance of a well balanced diet, moderation of sodium/saturated fat, caloric balance and sufficient intake of fiber  · Exercise: Stressed the importance of regular exercise with a goal of 150 minutes per week  · Dental Health: Discussed daily flossing and brushing and regular dental visits     · Immunizations reviewed and Adacel is due  · Discussed benefits of screening mammogram    BMI Counseling: Body mass index is 37 8 kg/m²  Discussed with patient's BMI with her  The BMI is above average  BMI counseling and education was provided to the patient  Exercise recommendations include exercising 3-5 times per week  Return in about 6 months (around 1/15/2020) for Next scheduled follow up  Chief Complaint     Chief Complaint   Patient presents with    Physical Exam     no pap  [facility] lpn       History of Present Illness     She is still struggling with back pain  She has had injections done and had a surgical consultation         Well Adult Physical   Patient here for a comprehensive physical exam       Diet and Physical Activity  Diet: well balanced diet  Exercise: infrequently      Depression Screen  PHQ-9 Depression Screening    PHQ-9:    Frequency of the following problems over the past two weeks:       Little interest or pleasure in doing things:  0 - not at all  Feeling down, depressed, or hopeless:  0 - not at all  PHQ-2 Score:  0          General Health  Hearing: Normal:  bilateral  Vision: wears glasses  Dental: regular dental visits    Reproductive Health  Hysterectomy      The following portions of the patient's history were reviewed and updated as appropriate: allergies, current medications, past family history, past medical history, past social history, past surgical history and problem list     Review of Systems     Review of Systems   Musculoskeletal: Positive for back pain         Past Medical History     Past Medical History:   Diagnosis Date    History of herniated intervertebral disc 10/19/2005       Past Surgical History     Past Surgical History:   Procedure Laterality Date    APPENDECTOMY       SECTION      FOOT SURGERY      exostosectomy for spur    HYSTERECTOMY      KNEE SURGERY      TUBAL LIGATION         Social History     Social History     Socioeconomic History    Marital status: /Civil Union     Spouse name: None    Number of children: None    Years of education: None    Highest education level: None   Occupational History    None   Social Needs    Financial resource strain: None    Food insecurity:     Worry: None     Inability: None    Transportation needs:     Medical: None     Non-medical: None   Tobacco Use    Smoking status: Never Smoker    Smokeless tobacco: Never Used   Substance and Sexual Activity    Alcohol use: Yes     Frequency: Monthly or less     Comment: rare use     Drug use: None    Sexual activity: None   Lifestyle    Physical activity:     Days per week: None     Minutes per session: None    Stress: None   Relationships    Social connections:     Talks on phone: None     Gets together: None     Attends Restorationist service: None     Active member of club or organization: None     Attends meetings of clubs or organizations: None     Relationship status: None    Intimate partner violence:     Fear of current or ex partner: None     Emotionally abused: None     Physically abused: None     Forced sexual activity: None   Other Topics Concern    None   Social History Narrative    None       Family History     Family History   Problem Relation Age of Onset    Diabetes Mother     Heart disease Mother     Arthritis Mother     Anxiety disorder Mother     Atrial fibrillation Father     Arthritis Father     No Known Problems Sister     Breast cancer Maternal Grandmother     No Known Problems Maternal Aunt     Colon cancer Neg Hx        Current Medications       Current Outpatient Medications:     celecoxib (CeleBREX) 200 mg capsule, , Disp: , Rfl: 0    clobetasol (TEMOVATE) 0 05 % cream, Apply topically 2 (two) times a day as needed, Disp: , Rfl:     clonazePAM (KlonoPIN) 0 5 mg tablet, Take 1 tablet (0 5 mg total) by mouth 2 (two) times a day as needed for seizures, Disp: 20 tablet, Rfl: 0    diclofenac sodium (VOLTAREN) 1 %, Place on the skin, Disp: , Rfl:     metaxalone (SKELAXIN) 800 mg tablet, take 1 tablet by mouth three times a day if needed, Disp: 30 tablet, Rfl: 0    RESTASIS 0 05 % ophthalmic emulsion, , Disp: , Rfl: 0    sertraline (ZOLOFT) 100 mg tablet, Take 1 tablet (100 mg total) by mouth daily, Disp: 90 tablet, Rfl: 1    verapamil (VERELAN) 300 MG CP24, Take 1 capsule (300 mg total) by mouth daily, Disp: 90 capsule, Rfl: 1     Allergies     Allergies   Allergen Reactions    Bee Venom Anaphylaxis     Reaction Date: 23Oct2006;   Reaction Date: 23Oct2006;     Meperidine GI Intolerance     DEMEROL vomiting  DEMEROL vomiting    Penicillins      Reaction Date: 25Jul2005;  Annotation - [id number]: as infant - throat closes, difficult breathing       Objective     /86   Pulse 96   Temp 98 3 °F (36 8 °C)   Resp 18   Ht 5' 6" (1 676 m)   Wt 106 kg (234 lb 3 2 oz)   BMI 37 80 kg/m² Physical Exam   Constitutional: She is oriented to person, place, and time  She appears well-developed and well-nourished  HENT:   Head: Normocephalic and atraumatic  Right Ear: External ear normal    Left Ear: External ear normal    Mouth/Throat: Oropharynx is clear and moist    Eyes: Pupils are equal, round, and reactive to light  Neck: Normal range of motion  Cardiovascular: Normal rate, regular rhythm and normal heart sounds  Exam reveals no friction rub  No murmur heard  Pulmonary/Chest: Effort normal and breath sounds normal  No respiratory distress  She has no wheezes  She has no rales  Abdominal: Soft  Bowel sounds are normal  She exhibits no distension and no mass  There is no tenderness  There is no rebound and no guarding  Musculoskeletal: Normal range of motion  She exhibits no edema  Neurological: She is alert and oriented to person, place, and time  She has normal reflexes  Skin: Skin is warm  Nursing note and vitals reviewed           Visual Acuity Screening    Right eye Left eye Both eyes   Without correction:      With correction: 20/20 20/20 1 [facility], [address] [place] [address]

## 2019-07-24 DIAGNOSIS — M54.16 RADICULOPATHY OF LUMBAR REGION: ICD-10-CM

## 2019-07-24 RX ORDER — METAXALONE 800 MG/1
TABLET ORAL
Qty: 30 TABLET | Refills: 0 | Status: SHIPPED | OUTPATIENT
Start: 2019-07-24 | End: 2020-01-20 | Stop reason: ALTCHOICE

## 2019-08-23 DIAGNOSIS — I10 BENIGN ESSENTIAL HYPERTENSION: ICD-10-CM

## 2019-08-23 RX ORDER — VERAPAMIL HYDROCHLORIDE 300 MG/1
300 CAPSULE, EXTENDED RELEASE ORAL DAILY
Qty: 90 CAPSULE | Refills: 0 | Status: SHIPPED | OUTPATIENT
Start: 2019-08-23 | End: 2019-12-28 | Stop reason: SDUPTHER

## 2019-09-17 DIAGNOSIS — F41.1 GENERALIZED ANXIETY DISORDER: ICD-10-CM

## 2019-09-17 RX ORDER — CLONAZEPAM 0.5 MG/1
0.5 TABLET ORAL 2 TIMES DAILY PRN
Qty: 20 TABLET | Refills: 0 | Status: SHIPPED | OUTPATIENT
Start: 2019-09-17 | End: 2019-11-20 | Stop reason: SDUPTHER

## 2019-11-01 DIAGNOSIS — F41.1 GENERALIZED ANXIETY DISORDER: ICD-10-CM

## 2019-11-01 RX ORDER — SERTRALINE HYDROCHLORIDE 100 MG/1
TABLET, FILM COATED ORAL
Qty: 90 TABLET | Refills: 1 | Status: SHIPPED | OUTPATIENT
Start: 2019-11-01 | End: 2020-05-14

## 2019-11-05 ENCOUNTER — IMMUNIZATIONS (OUTPATIENT)
Dept: FAMILY MEDICINE CLINIC | Facility: CLINIC | Age: 54
End: 2019-11-05
Payer: COMMERCIAL

## 2019-11-05 DIAGNOSIS — Z23 NEED FOR INFLUENZA VACCINATION: Primary | ICD-10-CM

## 2019-11-05 PROCEDURE — 90471 IMMUNIZATION ADMIN: CPT

## 2019-11-05 PROCEDURE — 90682 RIV4 VACC RECOMBINANT DNA IM: CPT

## 2019-11-20 ENCOUNTER — OFFICE VISIT (OUTPATIENT)
Dept: FAMILY MEDICINE CLINIC | Facility: CLINIC | Age: 54
End: 2019-11-20
Payer: COMMERCIAL

## 2019-11-20 VITALS
RESPIRATION RATE: 16 BRPM | SYSTOLIC BLOOD PRESSURE: 124 MMHG | HEART RATE: 106 BPM | WEIGHT: 220.8 LBS | DIASTOLIC BLOOD PRESSURE: 87 MMHG | TEMPERATURE: 99.2 F | BODY MASS INDEX: 35.48 KG/M2 | HEIGHT: 66 IN

## 2019-11-20 DIAGNOSIS — F41.1 GENERALIZED ANXIETY DISORDER: Primary | ICD-10-CM

## 2019-11-20 PROCEDURE — 99213 OFFICE O/P EST LOW 20 MIN: CPT | Performed by: FAMILY MEDICINE

## 2019-11-20 PROCEDURE — 1036F TOBACCO NON-USER: CPT | Performed by: FAMILY MEDICINE

## 2019-11-20 RX ORDER — CLONAZEPAM 0.5 MG/1
0.5 TABLET ORAL 3 TIMES DAILY PRN
Qty: 90 TABLET | Refills: 2 | Status: SHIPPED | OUTPATIENT
Start: 2019-11-20 | End: 2021-05-24

## 2019-11-20 NOTE — ASSESSMENT & PLAN NOTE
Worsening anxiety from family stress  Will temporarily raise dose of clonazepam    She is going to work on her home mindfulness technique

## 2019-11-20 NOTE — PROGRESS NOTES
Assessment/Plan:    1  Generalized anxiety disorder  Assessment & Plan:  Worsening anxiety from family stress  Will temporarily raise dose of clonazepam    She is going to work on her home mindfulness technique     Orders:  -     clonazePAM (KlonoPIN) 0 5 mg tablet; Take 1 tablet (0 5 mg total) by mouth 3 (three) times a day as needed for anxiety    Risks and benefits of medication discussed  BMI Counseling: Body mass index is 35 64 kg/m²  The BMI is above normal  Exercise recommendations include exercising 3-5 times per week  [place] prescription monitoring program report reviewed and is appropriate  PA and [place] included in search criteria  There are no Patient Instructions on file for this visit  No follow-ups on file  Subjective:      Patient ID: Jayna Valadez is a 47 y o  female  Chief Complaint   Patient presents with   [address]       Her daughter and her  are getting a divorce  She has been having a hard time dealing with it  She is trying to be there for her daughter and she is internalizing every thing  She is now feeling anxious all the time  It has been worse for the past 2 weeks  She is trying to stay strong for her daughter         The following portions of the patient's history were reviewed and updated as appropriate:  past social history    Review of Systems      Current Outpatient Medications   Medication Sig Dispense Refill    clobetasol (TEMOVATE) 0 05 % cream Apply topically 2 (two) times a day as needed      clonazePAM (KlonoPIN) 0 5 mg tablet Take 1 tablet (0 5 mg total) by mouth 3 (three) times a day as needed for anxiety 90 tablet 2    diclofenac sodium (VOLTAREN) 1 % Place on the skin      metaxalone (SKELAXIN) 800 mg tablet take 1 tablet by mouth three times a day if needed 30 tablet 0    RESTASIS 0 05 % ophthalmic emulsion   0    sertraline (ZOLOFT) 100 mg tablet take 1 tablet by mouth once daily 90 tablet 1    verapamil (VERELAN) 300 MG CP24 Take 1 capsule (300 mg total) by mouth daily 90 capsule 0    celecoxib (CeleBREX) 200 mg capsule   0     No current facility-administered medications for this visit  Objective:    /87   Pulse (!) 106   Temp 99 2 °F (37 3 °C)   Resp 16   Ht 5' 6" (1 676 m)   Wt 100 kg (220 lb 12 8 oz)   BMI 35 64 kg/m²      Physical Exam   Constitutional: She appears well-developed and well-nourished  HENT:   Head: Normocephalic and atraumatic  Right Ear: External ear normal    Left Ear: External ear normal    Mouth/Throat: Oropharynx is clear and moist    Cardiovascular: Normal rate, regular rhythm and normal heart sounds  Exam reveals no friction rub  No murmur heard  Pulmonary/Chest: Effort normal and breath sounds normal  No respiratory distress  She has no wheezes  She has no rales  Musculoskeletal: She exhibits no edema or deformity  Psychiatric: Her mood appears anxious  Nursing note and vitals reviewed            Reji Hatfield, DO

## 2019-12-28 DIAGNOSIS — I10 BENIGN ESSENTIAL HYPERTENSION: ICD-10-CM

## 2020-01-01 RX ORDER — VERAPAMIL HYDROCHLORIDE 300 MG/1
CAPSULE, EXTENDED RELEASE ORAL
Qty: 90 CAPSULE | Refills: 1 | Status: SHIPPED | OUTPATIENT
Start: 2020-01-01 | End: 2020-07-20

## 2020-01-20 ENCOUNTER — OFFICE VISIT (OUTPATIENT)
Dept: FAMILY MEDICINE CLINIC | Facility: CLINIC | Age: 55
End: 2020-01-20
Payer: COMMERCIAL

## 2020-01-20 VITALS
HEIGHT: 66 IN | TEMPERATURE: 97.9 F | OXYGEN SATURATION: 96 % | RESPIRATION RATE: 16 BRPM | SYSTOLIC BLOOD PRESSURE: 100 MMHG | WEIGHT: 221 LBS | DIASTOLIC BLOOD PRESSURE: 76 MMHG | BODY MASS INDEX: 35.52 KG/M2 | HEART RATE: 86 BPM

## 2020-01-20 DIAGNOSIS — I10 BENIGN ESSENTIAL HYPERTENSION: Primary | ICD-10-CM

## 2020-01-20 DIAGNOSIS — E66.01 SEVERE OBESITY (BMI 35.0-39.9) WITH COMORBIDITY (HCC): ICD-10-CM

## 2020-01-20 DIAGNOSIS — R73.01 IMPAIRED FASTING GLUCOSE: ICD-10-CM

## 2020-01-20 DIAGNOSIS — F41.1 GENERALIZED ANXIETY DISORDER: ICD-10-CM

## 2020-01-20 PROCEDURE — 99214 OFFICE O/P EST MOD 30 MIN: CPT | Performed by: FAMILY MEDICINE

## 2020-01-20 PROCEDURE — 3074F SYST BP LT 130 MM HG: CPT | Performed by: FAMILY MEDICINE

## 2020-01-20 PROCEDURE — 3078F DIAST BP <80 MM HG: CPT | Performed by: FAMILY MEDICINE

## 2020-01-20 PROCEDURE — 3008F BODY MASS INDEX DOCD: CPT | Performed by: FAMILY MEDICINE

## 2020-01-20 PROCEDURE — 1036F TOBACCO NON-USER: CPT | Performed by: FAMILY MEDICINE

## 2020-01-20 NOTE — PROGRESS NOTES
Assessment/Plan:    1  Benign essential hypertension  Assessment & Plan:  Stable  Continue verapamil 300 mg a day    Orders:  -     CBC; Future; Expected date: 07/03/2020  -     Comprehensive metabolic panel; Future; Expected date: 07/03/2020  -     Lipid Panel with Direct LDL reflex; Future; Expected date: 07/03/2020  -     TSH, 3rd generation; Future; Expected date: 07/03/2020  -     CBC  -     Comprehensive metabolic panel  -     Lipid Panel with Direct LDL reflex  -     TSH, 3rd generation    2  Impaired fasting glucose  -     Comprehensive metabolic panel; Future; Expected date: 07/03/2020  -     Hemoglobin A1C; Future; Expected date: 07/03/2020  -     Comprehensive metabolic panel  -     Hemoglobin A1C    3  Generalized anxiety disorder  Assessment & Plan:  Much improved  Will continue wean down the clonazepam  She is down to 1/2 twice a day      4  Severe obesity (BMI 35 0-39  9) with comorbidity [facility])  Assessment & Plan:  Has lost weight since last summer  Encouraged further weight loss and exercise       BMI Counseling: Body mass index is 35 67 kg/m²  The BMI is above normal  Exercise recommendations include exercising 3-5 times per week  She had her lab work done this morning  There are no Patient Instructions on file for this visit  Return in about 6 months (around 7/20/2020) for Annual physical     Subjective:      Patient ID: Negin Pearson is a 47 y o  female  Chief Complaint   Patient presents with    Follow-up     6 month [id number]       She is feeling much better  Her anxiety is much better  She is working and doing well  Impaired fasting glucose - she is trying to watch her diet  Hypertension   This is a chronic problem  The current episode started more than 1 year ago  The problem is unchanged  The problem is controlled  Pertinent negatives include no peripheral edema or shortness of breath  Past treatments include calcium channel blockers   The current treatment provides moderate improvement  There are no compliance problems  The following portions of the patient's history were reviewed and updated as appropriate:  past social history    Review of Systems   Respiratory: Negative for shortness of breath  Cardiovascular: Negative  Current Outpatient Medications   Medication Sig Dispense Refill    clobetasol (TEMOVATE) 0 05 % cream Apply topically 2 (two) times a day as needed      clonazePAM (KlonoPIN) 0 5 mg tablet Take 1 tablet (0 5 mg total) by mouth 3 (three) times a day as needed for anxiety 90 tablet 2    diclofenac sodium (VOLTAREN) 1 % Place on the skin      RESTASIS 0 05 % ophthalmic emulsion   0    sertraline (ZOLOFT) 100 mg tablet take 1 tablet by mouth once daily 90 tablet 1    verapamil (VERELAN) 300 MG CP24 take 1 capsule by mouth once daily 90 capsule 1    celecoxib (CeleBREX) 200 mg capsule   0     No current facility-administered medications for this visit  Objective:    /76   Pulse 86   Temp 97 9 °F (36 6 °C)   Resp 16   Ht 5' 6" (1 676 m)   Wt 100 kg (221 lb)   SpO2 96%   BMI 35 67 kg/m²      Physical Exam   Constitutional: She appears well-developed and well-nourished  HENT:   Head: Normocephalic and atraumatic  Right Ear: External ear normal    Left Ear: External ear normal    Mouth/Throat: Oropharynx is clear and moist    Cardiovascular: Normal rate, regular rhythm and normal heart sounds  Exam reveals no friction rub  No murmur heard  Pulmonary/Chest: Effort normal and breath sounds normal  No respiratory distress  She has no wheezes  She has no rales  Musculoskeletal: She exhibits no edema or deformity  Nursing note and vitals reviewed            Nguyễn Alegria DO

## 2020-01-21 LAB
ALBUMIN SERPL-MCNC: 4.8 G/DL (ref 3.8–4.9)
ALBUMIN/GLOB SERPL: 1.7 {RATIO} (ref 1.2–2.2)
ALP SERPL-CCNC: 98 IU/L (ref 39–117)
ALT SERPL-CCNC: 26 IU/L (ref 0–32)
AST SERPL-CCNC: 23 IU/L (ref 0–40)
BASOPHILS # BLD AUTO: 0 X10E3/UL (ref 0–0.2)
BASOPHILS NFR BLD AUTO: 0 %
BILIRUB SERPL-MCNC: 0.5 MG/DL (ref 0–1.2)
BUN SERPL-MCNC: 12 MG/DL (ref 6–24)
BUN/CREAT SERPL: 11 (ref 9–23)
CALCIUM SERPL-MCNC: 9.8 MG/DL (ref 8.7–10.2)
CHLORIDE SERPL-SCNC: 99 MMOL/L (ref 96–106)
CHOLEST SERPL-MCNC: 195 MG/DL (ref 100–199)
CO2 SERPL-SCNC: 23 MMOL/L (ref 20–29)
CREAT SERPL-MCNC: 1.14 MG/DL (ref 0.57–1)
EOSINOPHIL # BLD AUTO: 0.3 X10E3/UL (ref 0–0.4)
EOSINOPHIL NFR BLD AUTO: 3 %
ERYTHROCYTE [DISTWIDTH] IN BLOOD BY AUTOMATED COUNT: 15 % (ref 11.7–15.4)
GLOBULIN SER-MCNC: 2.8 G/DL (ref 1.5–4.5)
GLUCOSE SERPL-MCNC: 95 MG/DL (ref 65–99)
HBA1C MFR BLD: 5.2 % (ref 4.8–5.6)
HCT VFR BLD AUTO: 42.8 % (ref 34–46.6)
HDLC SERPL-MCNC: 58 MG/DL
HGB BLD-MCNC: 14.5 G/DL (ref 11.1–15.9)
IMM GRANULOCYTES # BLD: 0 X10E3/UL (ref 0–0.1)
IMM GRANULOCYTES NFR BLD: 0 %
LDLC SERPL CALC-MCNC: 108 MG/DL (ref 0–99)
LYMPHOCYTES # BLD AUTO: 2.9 X10E3/UL (ref 0.7–3.1)
LYMPHOCYTES NFR BLD AUTO: 36 %
MCH RBC QN AUTO: 28.3 PG (ref 26.6–33)
MCHC RBC AUTO-ENTMCNC: 33.9 G/DL (ref 31.5–35.7)
MCV RBC AUTO: 83 FL (ref 79–97)
MICRODELETION SYND BLD/T FISH: NORMAL
MICRODELETION SYND BLD/T FISH: NORMAL
MONOCYTES # BLD AUTO: 0.4 X10E3/UL (ref 0.1–0.9)
MONOCYTES NFR BLD AUTO: 5 %
NEUTROPHILS # BLD AUTO: 4.6 X10E3/UL (ref 1.4–7)
NEUTROPHILS NFR BLD AUTO: 56 %
PLATELET # BLD AUTO: 325 X10E3/UL (ref 150–450)
POTASSIUM SERPL-SCNC: 5.2 MMOL/L (ref 3.5–5.2)
PROT SERPL-MCNC: 7.6 G/DL (ref 6–8.5)
RBC # BLD AUTO: 5.13 X10E6/UL (ref 3.77–5.28)
SL AMB EGFR AFRICAN AMERICAN: 63 ML/MIN/1.73
SL AMB EGFR NON AFRICAN AMERICAN: 55 ML/MIN/1.73
SL AMB PDF IMAGE: NORMAL
SODIUM SERPL-SCNC: 138 MMOL/L (ref 134–144)
TRIGL SERPL-MCNC: 147 MG/DL (ref 0–149)
TSH SERPL DL<=0.005 MIU/L-ACNC: 3.03 UIU/ML (ref 0.45–4.5)
WBC # BLD AUTO: 8.2 X10E3/UL (ref 3.4–10.8)

## 2020-02-17 ENCOUNTER — TRANSCRIBE ORDERS (OUTPATIENT)
Dept: ADMINISTRATIVE | Facility: HOSPITAL | Age: 55
End: 2020-02-17

## 2020-02-17 DIAGNOSIS — N63.20 LEFT BREAST MASS: Primary | ICD-10-CM

## 2020-02-20 ENCOUNTER — HOSPITAL ENCOUNTER (OUTPATIENT)
Dept: RADIOLOGY | Facility: HOSPITAL | Age: 55
Discharge: HOME | End: 2020-02-20

## 2020-03-05 ENCOUNTER — HOSPITAL ENCOUNTER (OUTPATIENT)
Dept: RADIOLOGY | Facility: HOSPITAL | Age: 55
Discharge: HOME | End: 2020-03-05
Payer: COMMERCIAL

## 2020-03-05 VITALS — WEIGHT: 221 LBS | BODY MASS INDEX: 35.52 KG/M2 | HEIGHT: 66 IN

## 2020-03-05 DIAGNOSIS — N63.20 LEFT BREAST MASS: ICD-10-CM

## 2020-03-05 PROCEDURE — 77065 DX MAMMO INCL CAD UNI: CPT

## 2020-03-05 PROCEDURE — 76642 ULTRASOUND BREAST LIMITED: CPT

## 2020-03-05 PROCEDURE — G0279 TOMOSYNTHESIS, MAMMO: HCPCS

## 2020-05-14 DIAGNOSIS — F41.1 GENERALIZED ANXIETY DISORDER: ICD-10-CM

## 2020-05-14 RX ORDER — SERTRALINE HYDROCHLORIDE 100 MG/1
TABLET, FILM COATED ORAL
Qty: 90 TABLET | Refills: 1 | Status: SHIPPED | OUTPATIENT
Start: 2020-05-14 | End: 2020-11-23

## 2020-07-20 DIAGNOSIS — I10 BENIGN ESSENTIAL HYPERTENSION: ICD-10-CM

## 2020-07-20 RX ORDER — VERAPAMIL HYDROCHLORIDE 300 MG/1
CAPSULE, EXTENDED RELEASE ORAL
Qty: 90 CAPSULE | Refills: 0 | Status: SHIPPED | OUTPATIENT
Start: 2020-07-20 | End: 2020-10-22

## 2020-09-10 LAB
ALBUMIN SERPL-MCNC: 4.6 G/DL (ref 3.8–4.9)
ALBUMIN/GLOB SERPL: 1.7 {RATIO} (ref 1.2–2.2)
ALP SERPL-CCNC: 86 IU/L (ref 39–117)
ALT SERPL-CCNC: 19 IU/L (ref 0–32)
AST SERPL-CCNC: 18 IU/L (ref 0–40)
BILIRUB SERPL-MCNC: 0.4 MG/DL (ref 0–1.2)
BUN SERPL-MCNC: 14 MG/DL (ref 6–24)
BUN/CREAT SERPL: 13 (ref 9–23)
CALCIUM SERPL-MCNC: 9.3 MG/DL (ref 8.7–10.2)
CHLORIDE SERPL-SCNC: 101 MMOL/L (ref 96–106)
CHOLEST SERPL-MCNC: 224 MG/DL (ref 100–199)
CO2 SERPL-SCNC: 23 MMOL/L (ref 20–29)
CREAT SERPL-MCNC: 1.09 MG/DL (ref 0.57–1)
ERYTHROCYTE [DISTWIDTH] IN BLOOD BY AUTOMATED COUNT: 13.5 % (ref 11.7–15.4)
EST. AVERAGE GLUCOSE BLD GHB EST-MCNC: 111 MG/DL
GLOBULIN SER-MCNC: 2.7 G/DL (ref 1.5–4.5)
GLUCOSE SERPL-MCNC: 87 MG/DL (ref 65–99)
HBA1C MFR BLD: 5.5 % (ref 4.8–5.6)
HCT VFR BLD AUTO: 40.2 % (ref 34–46.6)
HDLC SERPL-MCNC: 56 MG/DL
HGB BLD-MCNC: 13.9 G/DL (ref 11.1–15.9)
LDLC SERPL CALC-MCNC: 141 MG/DL (ref 0–99)
LDLC/HDLC SERPL: 2.5 RATIO (ref 0–3.2)
MCH RBC QN AUTO: 28.5 PG (ref 26.6–33)
MCHC RBC AUTO-ENTMCNC: 34.6 G/DL (ref 31.5–35.7)
MCV RBC AUTO: 82 FL (ref 79–97)
MICRODELETION SYND BLD/T FISH: NORMAL
MICRODELETION SYND BLD/T FISH: NORMAL
PLATELET # BLD AUTO: 318 X10E3/UL (ref 150–450)
POTASSIUM SERPL-SCNC: 4.9 MMOL/L (ref 3.5–5.2)
PROT SERPL-MCNC: 7.3 G/DL (ref 6–8.5)
RBC # BLD AUTO: 4.88 X10E6/UL (ref 3.77–5.28)
SL AMB EGFR AFRICAN AMERICAN: 66 ML/MIN/1.73
SL AMB EGFR NON AFRICAN AMERICAN: 57 ML/MIN/1.73
SL AMB PDF IMAGE: NORMAL
SL AMB VLDL CHOLESTEROL CALC: 27 MG/DL (ref 5–40)
SODIUM SERPL-SCNC: 137 MMOL/L (ref 134–144)
TRIGL SERPL-MCNC: 149 MG/DL (ref 0–149)
TSH SERPL DL<=0.005 MIU/L-ACNC: 2.05 UIU/ML (ref 0.45–4.5)
WBC # BLD AUTO: 7.9 X10E3/UL (ref 3.4–10.8)

## 2020-09-18 ENCOUNTER — OFFICE VISIT (OUTPATIENT)
Dept: FAMILY MEDICINE CLINIC | Facility: CLINIC | Age: 55
End: 2020-09-18
Payer: COMMERCIAL

## 2020-09-18 VITALS
DIASTOLIC BLOOD PRESSURE: 72 MMHG | WEIGHT: 230 LBS | HEIGHT: 66 IN | RESPIRATION RATE: 16 BRPM | HEART RATE: 72 BPM | SYSTOLIC BLOOD PRESSURE: 116 MMHG | TEMPERATURE: 96.7 F | BODY MASS INDEX: 36.96 KG/M2

## 2020-09-18 DIAGNOSIS — N18.30 CKD (CHRONIC KIDNEY DISEASE) STAGE 3, GFR 30-59 ML/MIN (HCC): ICD-10-CM

## 2020-09-18 DIAGNOSIS — I10 BENIGN ESSENTIAL HYPERTENSION: Primary | ICD-10-CM

## 2020-09-18 DIAGNOSIS — R73.01 IMPAIRED FASTING GLUCOSE: ICD-10-CM

## 2020-09-18 DIAGNOSIS — E66.01 SEVERE OBESITY (BMI 35.0-39.9) WITH COMORBIDITY (HCC): ICD-10-CM

## 2020-09-18 PROCEDURE — 99214 OFFICE O/P EST MOD 30 MIN: CPT | Performed by: FAMILY MEDICINE

## 2020-09-18 PROCEDURE — 3078F DIAST BP <80 MM HG: CPT | Performed by: FAMILY MEDICINE

## 2020-09-18 PROCEDURE — 3725F SCREEN DEPRESSION PERFORMED: CPT | Performed by: FAMILY MEDICINE

## 2020-09-18 PROCEDURE — 3074F SYST BP LT 130 MM HG: CPT | Performed by: FAMILY MEDICINE

## 2020-09-18 PROCEDURE — 1036F TOBACCO NON-USER: CPT | Performed by: FAMILY MEDICINE

## 2020-09-18 NOTE — PROGRESS NOTES
Assessment/Plan:    1  Benign essential hypertension  Assessment & Plan:  Well controlled  Continue verapamil    Orders:  -     CBC; Future; Expected date: 03/02/2021  -     Comprehensive metabolic panel; Future; Expected date: 03/02/2021  -     Lipid Panel with Direct LDL reflex; Future; Expected date: 03/02/2021  -     TSH, 3rd generation; Future; Expected date: 03/02/2021  -     CBC  -     Comprehensive metabolic panel  -     Lipid Panel with Direct LDL reflex  -     TSH, 3rd generation    2  Impaired fasting glucose  Assessment & Plan:  Fasting sugar is normal  Will monitor    Orders:  -     Hemoglobin A1C; Future; Expected date: 03/02/2021  -     Hemoglobin A1C    3  Severe obesity (BMI 35 0-39  9) with comorbidity ([facility] [address] )  Assessment & Plan:  worsening      4  CKD (chronic kidney disease) stage 3, GFR 30-59 ml/min ([facility])  Assessment & Plan:  Renal function is stable  Advised to avoid all NSAIDs including Advil and Aleve    Orders:  -     Comprehensive metabolic panel; Future; Expected date: 03/02/2021  -     Comprehensive metabolic panel         There are no Patient Instructions on file for this visit  Return in about 6 months (around 3/18/2021) for Annual physical     Subjective:      Patient ID: Alyx Frances is a 54 y o  female  Chief Complaint   Patient presents with    Follow-up     new labs--lj       Patient reports that she has had anxiety from [place] quarantine  It has been very hard for her  She is trying to make sure she leaves the house could she does not get to be where she is afraid to leave her home  Hypertension-patient is taking her blood pressure medicine daily  She is not having problems with leg swelling  Obesity-patient reports that she has been eating more due to stress  The following portions of the patient's history were reviewed and updated as appropriate:  past social history    Review of Systems   Respiratory: Negative  Cardiovascular: Negative  Psychiatric/Behavioral: The patient is nervous/anxious  Current Outpatient Medications   Medication Sig Dispense Refill    clobetasol (TEMOVATE) 0 05 % cream Apply topically 2 (two) times a day as needed      clonazePAM (KlonoPIN) 0 5 mg tablet Take 1 tablet (0 5 mg total) by mouth 3 (three) times a day as needed for anxiety 90 tablet 2    diclofenac sodium (VOLTAREN) 1 % Place on the skin      RESTASIS 0 05 % ophthalmic emulsion   0    sertraline (ZOLOFT) 100 mg tablet take 1 tablet by mouth once daily 90 tablet 1    verapamil (VERELAN) 300 MG CP24 take 1 capsule by mouth once daily 90 capsule 0     No current facility-administered medications for this visit  Objective:    /72   Pulse 72   Temp (!) 96 7 °F (35 9 °C)   Resp 16   Ht 5' 6" (1 676 m)   Wt 104 kg (230 lb)   BMI 37 12 kg/m²      Physical Exam  Vitals signs and nursing note reviewed  Constitutional:       Appearance: She is well-developed  HENT:      Head: Normocephalic and atraumatic  Right Ear: External ear normal       Left Ear: External ear normal    Cardiovascular:      Rate and Rhythm: Normal rate and regular rhythm  Heart sounds: Normal heart sounds  No murmur  No friction rub  Pulmonary:      Effort: Pulmonary effort is normal  No respiratory distress  Breath sounds: Normal breath sounds  No wheezing or rales  Musculoskeletal:         General: No deformity  Psychiatric:         Mood and Affect: Affect is tearful               Claiborne Share, DO

## 2020-10-22 DIAGNOSIS — I10 BENIGN ESSENTIAL HYPERTENSION: ICD-10-CM

## 2020-10-22 RX ORDER — VERAPAMIL HYDROCHLORIDE 300 MG/1
CAPSULE, EXTENDED RELEASE ORAL
Qty: 90 CAPSULE | Refills: 1 | Status: SHIPPED | OUTPATIENT
Start: 2020-10-22 | End: 2021-05-24

## 2020-10-28 ENCOUNTER — CLINICAL SUPPORT (OUTPATIENT)
Dept: FAMILY MEDICINE CLINIC | Facility: CLINIC | Age: 55
End: 2020-10-28
Payer: COMMERCIAL

## 2020-10-28 DIAGNOSIS — Z23 NEED FOR INFLUENZA VACCINATION: Primary | ICD-10-CM

## 2020-10-28 PROCEDURE — 90471 IMMUNIZATION ADMIN: CPT

## 2020-10-28 PROCEDURE — 90682 RIV4 VACC RECOMBINANT DNA IM: CPT

## 2020-11-22 DIAGNOSIS — F41.1 GENERALIZED ANXIETY DISORDER: ICD-10-CM

## 2020-11-23 RX ORDER — SERTRALINE HYDROCHLORIDE 100 MG/1
TABLET, FILM COATED ORAL
Qty: 90 TABLET | Refills: 1 | Status: SHIPPED | OUTPATIENT
Start: 2020-11-23 | End: 2020-11-23 | Stop reason: SDUPTHER

## 2020-11-23 RX ORDER — SERTRALINE HYDROCHLORIDE 100 MG/1
100 TABLET, FILM COATED ORAL DAILY
Qty: 90 TABLET | Refills: 1 | Status: SHIPPED | OUTPATIENT
Start: 2020-11-23 | End: 2021-06-23

## 2020-11-27 DIAGNOSIS — F41.1 GENERALIZED ANXIETY DISORDER: ICD-10-CM

## 2020-11-27 RX ORDER — SERTRALINE HYDROCHLORIDE 100 MG/1
100 TABLET, FILM COATED ORAL DAILY
Qty: 90 TABLET | Refills: 1 | OUTPATIENT
Start: 2020-11-27

## 2021-01-11 ENCOUNTER — IMMUNIZATIONS (OUTPATIENT)
Dept: FAMILY MEDICINE CLINIC | Facility: HOSPITAL | Age: 56
End: 2021-01-11

## 2021-01-11 DIAGNOSIS — Z23 ENCOUNTER FOR IMMUNIZATION: ICD-10-CM

## 2021-02-08 ENCOUNTER — IMMUNIZATIONS (OUTPATIENT)
Dept: FAMILY MEDICINE CLINIC | Facility: HOSPITAL | Age: 56
End: 2021-02-08

## 2021-02-08 DIAGNOSIS — Z23 ENCOUNTER FOR IMMUNIZATION: Primary | ICD-10-CM

## 2021-02-08 PROCEDURE — 0012A SARS-COV-2 / COVID-19 MRNA VACCINE (MODERNA) 100 MCG: CPT

## 2021-02-08 PROCEDURE — 91301 SARS-COV-2 / COVID-19 MRNA VACCINE (MODERNA) 100 MCG: CPT

## 2021-03-19 ENCOUNTER — OFFICE VISIT (OUTPATIENT)
Dept: FAMILY MEDICINE CLINIC | Facility: CLINIC | Age: 56
End: 2021-03-19
Payer: COMMERCIAL

## 2021-03-19 VITALS
BODY MASS INDEX: 37.45 KG/M2 | SYSTOLIC BLOOD PRESSURE: 112 MMHG | TEMPERATURE: 98.7 F | WEIGHT: 233 LBS | DIASTOLIC BLOOD PRESSURE: 68 MMHG | HEART RATE: 74 BPM | RESPIRATION RATE: 18 BRPM | HEIGHT: 66 IN

## 2021-03-19 DIAGNOSIS — Z12.31 ENCOUNTER FOR SCREENING MAMMOGRAM FOR BREAST CANCER: ICD-10-CM

## 2021-03-19 DIAGNOSIS — N18.31 STAGE 3A CHRONIC KIDNEY DISEASE (HCC): ICD-10-CM

## 2021-03-19 DIAGNOSIS — I10 BENIGN ESSENTIAL HYPERTENSION: ICD-10-CM

## 2021-03-19 DIAGNOSIS — K21.9 GASTROESOPHAGEAL REFLUX DISEASE, UNSPECIFIED WHETHER ESOPHAGITIS PRESENT: ICD-10-CM

## 2021-03-19 DIAGNOSIS — Z00.00 ANNUAL PHYSICAL EXAM: Primary | ICD-10-CM

## 2021-03-19 DIAGNOSIS — E66.01 SEVERE OBESITY (BMI 35.0-39.9) WITH COMORBIDITY (HCC): ICD-10-CM

## 2021-03-19 PROCEDURE — 99396 PREV VISIT EST AGE 40-64: CPT | Performed by: FAMILY MEDICINE

## 2021-03-19 PROCEDURE — 3725F SCREEN DEPRESSION PERFORMED: CPT | Performed by: FAMILY MEDICINE

## 2021-03-19 PROCEDURE — 3074F SYST BP LT 130 MM HG: CPT | Performed by: FAMILY MEDICINE

## 2021-03-19 PROCEDURE — 1036F TOBACCO NON-USER: CPT | Performed by: FAMILY MEDICINE

## 2021-03-19 PROCEDURE — 3008F BODY MASS INDEX DOCD: CPT | Performed by: FAMILY MEDICINE

## 2021-03-19 PROCEDURE — 3078F DIAST BP <80 MM HG: CPT | Performed by: FAMILY MEDICINE

## 2021-03-19 RX ORDER — OMEPRAZOLE 40 MG/1
40 CAPSULE, DELAYED RELEASE ORAL DAILY
Qty: 42 CAPSULE | Refills: 0 | Status: SHIPPED | OUTPATIENT
Start: 2021-03-19 | End: 2021-09-20 | Stop reason: ALTCHOICE

## 2021-03-19 NOTE — PROGRESS NOTES
FAMILY PRACTICE HEALTH MAINTENANCE OFFICE VISIT  [facility]    NAME: Derrell Luna  AGE: 64 y o  SEX: female  : 1965     DATE: 3/19/2021    Assessment and Plan     1  Annual physical exam    2  Encounter for screening mammogram for breast cancer  -     Mammo screening bilateral w 3d & cad; Future; Expected date: 2021    3  Severe obesity (BMI 35 0-39  9) with comorbidity ([address] )  Assessment & Plan:  Unchanged   Weight loss discussed      4  Benign essential hypertension    5  Stage 3a chronic kidney disease    6  Gastroesophageal reflux disease, unspecified whether esophagitis present  -     omeprazole (PriLOSEC) 40 MG capsule; Take 1 capsule (40 mg total) by mouth daily      · Patient Counseling:   · Nutrition: Stressed importance of a well balanced diet, moderation of sodium/saturated fat, caloric balance and sufficient intake of fiber  · Exercise: Stressed the importance of regular exercise with a goal of 150 minutes per week  · Dental Health: Discussed daily flossing and brushing and regular dental visits     · Immunizations reviewed: offered her the Shingles vaccine, she will think about it   · Discussed benefits of:  Colon Cancer Screening, Mammogram , Cervical Cancer screening and Screening labs   BMI Counseling: Body mass index is 37 61 kg/m²  Discussed with patient's BMI with her  The BMI is above normal  Exercise recommendations include exercising 3-5 times per week  Return in about 6 months (around 2021) for Next scheduled follow up  Chief Complaint     Chief Complaint   Patient presents with    Well Check     CPE ss,lpn       History of Present Illness     She went for her lab work done this morning         Well Adult Physical   Patient here for a comprehensive physical exam       Diet and Physical Activity  Diet: well balanced diet  Exercise: infrequently      Depression Screen  PHQ-9 Depression Screening    PHQ-9:   Frequency of the following problems over the past two weeks:      Little interest or pleasure in doing things: 1 - several days  Feeling down, depressed, or hopeless: 1 - several days  PHQ-2 Score: 2          General Health  Hearing: Slighty decreased: bilateral  Vision: wears glasses  Dental: regular dental visits    Reproductive Health  hysterectomy, follows with gynecologist       The following portions of the patient's history were reviewed and updated as appropriate: allergies, current medications, past family history, past medical history, past social history, past surgical history and problem list     Review of Systems     Review of Systems   Constitutional: Negative  Respiratory: Negative  Cardiovascular: Negative      Gastrointestinal:        Heart burn and epigastric burning pain        Past Medical History     Past Medical History:   Diagnosis Date    History of herniated intervertebral disc 10/19/2005       Past Surgical History     Past Surgical History:   Procedure Laterality Date    APPENDECTOMY       SECTION      FOOT SURGERY      exostosectomy for spur    HYSTERECTOMY      KNEE SURGERY      TUBAL LIGATION         Social History     Social History     Socioeconomic History    Marital status: /Civil Union     Spouse name: None    Number of children: None    Years of education: None    Highest education level: None   Occupational History    None   Social Needs    Financial resource strain: None    Food insecurity     Worry: None     Inability: None    Transportation needs     Medical: None     Non-medical: None   Tobacco Use    Smoking status: Never Smoker    Smokeless tobacco: Never Used   Substance and Sexual Activity    Alcohol use: Yes     Frequency: Monthly or less     Comment: rare use     Drug use: None    Sexual activity: None   Lifestyle    Physical activity     Days per week: None     Minutes per session: None    Stress: None   Relationships    Social connections Talks on phone: None     Gets together: None     Attends [employer] service: None     Active member of club or organization: None     Attends meetings of clubs or organizations: None     Relationship status: None    Intimate partner violence     Fear of current or ex partner: None     Emotionally abused: None     Physically abused: None     Forced sexual activity: None   Other Topics Concern    None   Social History Narrative    None       Family History     Family History   Problem Relation Age of Onset    Diabetes Mother     Heart disease Mother     Arthritis Mother     Anxiety disorder Mother     Atrial fibrillation Father     Arthritis Father     No Known Problems Sister     Breast cancer Maternal Grandmother     No Known Problems Maternal Aunt     Colon cancer Neg Hx        Current Medications       Current Outpatient Medications:     clobetasol (TEMOVATE) 0 05 % cream, Apply topically 2 (two) times a day as needed, Disp: , Rfl:     clonazePAM (KlonoPIN) 0 5 mg tablet, Take 1 tablet (0 5 mg total) by mouth 3 (three) times a day as needed for anxiety, Disp: 90 tablet, Rfl: 2    diclofenac sodium (VOLTAREN) 1 %, Place on the skin, Disp: , Rfl:     RESTASIS 0 05 % ophthalmic emulsion, , Disp: , Rfl: 0    sertraline (ZOLOFT) 100 mg tablet, Take 1 tablet (100 mg total) by mouth daily, Disp: 90 tablet, Rfl: 1    verapamil (VERELAN) 300 MG CP24, take 1 capsule by mouth once daily, Disp: 90 capsule, Rfl: 1    omeprazole (PriLOSEC) 40 MG capsule, Take 1 capsule (40 mg total) by mouth daily, Disp: 42 capsule, Rfl: 0     Allergies     Allergies   Allergen Reactions    Bee Venom Anaphylaxis     Reaction Date: 23Oct2006;   Reaction Date: 23Oct2006;     Meperidine GI Intolerance     DEMEROL vomiting  DEMEROL vomiting    Penicillins      Reaction Date: 25Jul2005;  Annotation - [id number]: as infant - throat closes, difficult breathing       Objective     /68   Pulse 74   Temp 98 7 °F (37 1 °C) Resp 18   Ht 5' 6" (1 676 m)   Wt 106 kg (233 lb)   BMI 37 61 kg/m²      Physical Exam  Vitals signs and nursing note reviewed  Constitutional:       Appearance: She is well-developed  HENT:      Head: Normocephalic and atraumatic  Right Ear: Tympanic membrane and external ear normal       Left Ear: Tympanic membrane and external ear normal    Eyes:      Pupils: Pupils are equal, round, and reactive to light  Neck:      Musculoskeletal: Normal range of motion  Cardiovascular:      Rate and Rhythm: Normal rate and regular rhythm  Heart sounds: Normal heart sounds  No murmur  No friction rub  Pulmonary:      Effort: Pulmonary effort is normal  No respiratory distress  Breath sounds: Normal breath sounds  No wheezing or rales  Abdominal:      General: Bowel sounds are normal  There is no distension  Palpations: Abdomen is soft  There is no mass  Tenderness: There is no abdominal tenderness  There is no guarding or rebound  Musculoskeletal:      Right lower leg: No edema  Left lower leg: No edema  Skin:     General: Skin is warm  Neurological:      Mental Status: She is alert and oriented to person, place, and time              Visual Acuity Screening    Right eye Left eye Both eyes   Without correction:      With correction: 20/20 20/25 [address]

## 2021-03-20 LAB
ALBUMIN SERPL-MCNC: 4.5 G/DL (ref 3.8–4.9)
ALBUMIN/GLOB SERPL: 1.5 {RATIO} (ref 1.2–2.2)
ALP SERPL-CCNC: 94 IU/L (ref 39–117)
ALT SERPL-CCNC: 20 IU/L (ref 0–32)
AST SERPL-CCNC: 19 IU/L (ref 0–40)
BILIRUB SERPL-MCNC: 0.4 MG/DL (ref 0–1.2)
BUN SERPL-MCNC: 14 MG/DL (ref 6–24)
BUN/CREAT SERPL: 15 (ref 9–23)
CALCIUM SERPL-MCNC: 9.4 MG/DL (ref 8.7–10.2)
CHLORIDE SERPL-SCNC: 103 MMOL/L (ref 96–106)
CHOLEST SERPL-MCNC: 220 MG/DL (ref 100–199)
CO2 SERPL-SCNC: 25 MMOL/L (ref 20–29)
CREAT SERPL-MCNC: 0.95 MG/DL (ref 0.57–1)
ERYTHROCYTE [DISTWIDTH] IN BLOOD BY AUTOMATED COUNT: 13.5 % (ref 11.7–15.4)
EST. AVERAGE GLUCOSE BLD GHB EST-MCNC: 108 MG/DL
GLOBULIN SER-MCNC: 3.1 G/DL (ref 1.5–4.5)
GLUCOSE SERPL-MCNC: 83 MG/DL (ref 65–99)
HBA1C MFR BLD: 5.4 % (ref 4.8–5.6)
HCT VFR BLD AUTO: 42.9 % (ref 34–46.6)
HDLC SERPL-MCNC: 58 MG/DL
HGB BLD-MCNC: 14.7 G/DL (ref 11.1–15.9)
LDLC SERPL CALC-MCNC: 138 MG/DL (ref 0–99)
LDLC/HDLC SERPL: 2.4 RATIO (ref 0–3.2)
MCH RBC QN AUTO: 28.9 PG (ref 26.6–33)
MCHC RBC AUTO-ENTMCNC: 34.3 G/DL (ref 31.5–35.7)
MCV RBC AUTO: 84 FL (ref 79–97)
MICRODELETION SYND BLD/T FISH: NORMAL
MICRODELETION SYND BLD/T FISH: NORMAL
PLATELET # BLD AUTO: 334 X10E3/UL (ref 150–450)
POTASSIUM SERPL-SCNC: 4.6 MMOL/L (ref 3.5–5.2)
PROT SERPL-MCNC: 7.6 G/DL (ref 6–8.5)
RBC # BLD AUTO: 5.08 X10E6/UL (ref 3.77–5.28)
SL AMB EGFR AFRICAN AMERICAN: 77 ML/MIN/1.73
SL AMB EGFR NON AFRICAN AMERICAN: 67 ML/MIN/1.73
SL AMB PDF IMAGE: NORMAL
SL AMB VLDL CHOLESTEROL CALC: 24 MG/DL (ref 5–40)
SODIUM SERPL-SCNC: 140 MMOL/L (ref 134–144)
TRIGL SERPL-MCNC: 137 MG/DL (ref 0–149)
TSH SERPL DL<=0.005 MIU/L-ACNC: 2.65 UIU/ML (ref 0.45–4.5)
WBC # BLD AUTO: 7.9 X10E3/UL (ref 3.4–10.8)

## 2021-04-26 ENCOUNTER — HOSPITAL ENCOUNTER (OUTPATIENT)
Dept: RADIOLOGY | Facility: HOSPITAL | Age: 56
Discharge: HOME | End: 2021-04-26
Payer: COMMERCIAL

## 2021-04-26 ENCOUNTER — TELEPHONE (OUTPATIENT)
Dept: FAMILY MEDICINE CLINIC | Facility: CLINIC | Age: 56
End: 2021-04-26

## 2021-04-26 VITALS — BODY MASS INDEX: 37.45 KG/M2 | HEIGHT: 66 IN | WEIGHT: 233 LBS

## 2021-04-26 DIAGNOSIS — Z12.31 ENCOUNTER FOR SCREENING MAMMOGRAM FOR BREAST CANCER: ICD-10-CM

## 2021-04-26 PROCEDURE — 77063 BREAST TOMOSYNTHESIS BI: CPT

## 2021-04-26 PROCEDURE — 77067 SCR MAMMO BI INCL CAD: CPT

## 2021-04-26 NOTE — TELEPHONE ENCOUNTER
4/26/2021 4:31 PM Called James Dickey to discuss her abnormal mammogram   She agreed to get the follow up studies done      Ivon Riddle DO

## 2021-05-05 ENCOUNTER — HOSPITAL ENCOUNTER (OUTPATIENT)
Dept: RADIOLOGY | Facility: HOSPITAL | Age: 56
Discharge: HOME | End: 2021-05-05
Payer: COMMERCIAL

## 2021-05-05 VITALS — HEIGHT: 66 IN | BODY MASS INDEX: 37.45 KG/M2 | WEIGHT: 233 LBS

## 2021-05-05 DIAGNOSIS — R92.8 ABNORMAL SCREENING MAMMOGRAM: ICD-10-CM

## 2021-05-05 PROCEDURE — 76642 ULTRASOUND BREAST LIMITED: CPT

## 2021-05-05 PROCEDURE — 77065 DX MAMMO INCL CAD UNI: CPT

## 2021-05-05 PROCEDURE — G0279 TOMOSYNTHESIS, MAMMO: HCPCS

## 2021-05-22 DIAGNOSIS — F41.1 GENERALIZED ANXIETY DISORDER: ICD-10-CM

## 2021-05-22 DIAGNOSIS — I10 BENIGN ESSENTIAL HYPERTENSION: ICD-10-CM

## 2021-05-24 RX ORDER — CLONAZEPAM 0.5 MG/1
TABLET ORAL
Qty: 90 TABLET | Refills: 0 | Status: SHIPPED | OUTPATIENT
Start: 2021-05-24

## 2021-05-24 RX ORDER — VERAPAMIL HYDROCHLORIDE 300 MG/1
CAPSULE, EXTENDED RELEASE ORAL
Qty: 90 CAPSULE | Refills: 1 | Status: SHIPPED | OUTPATIENT
Start: 2021-05-24 | End: 2021-09-20 | Stop reason: SDUPTHER

## 2021-06-22 ENCOUNTER — OFFICE VISIT (OUTPATIENT)
Dept: PODIATRY | Facility: CLINIC | Age: 56
End: 2021-06-22
Payer: COMMERCIAL

## 2021-06-22 VITALS
WEIGHT: 233 LBS | RESPIRATION RATE: 17 BRPM | SYSTOLIC BLOOD PRESSURE: 107 MMHG | HEIGHT: 66 IN | HEART RATE: 89 BPM | DIASTOLIC BLOOD PRESSURE: 81 MMHG | BODY MASS INDEX: 37.45 KG/M2

## 2021-06-22 DIAGNOSIS — M21.42 ACQUIRED FLAT FOOT, LEFT: ICD-10-CM

## 2021-06-22 DIAGNOSIS — M21.41 ACQUIRED FLAT FOOT, RIGHT: ICD-10-CM

## 2021-06-22 DIAGNOSIS — M76.822 POSTERIOR TIBIAL TENDINITIS OF LEFT LOWER EXTREMITY: Primary | ICD-10-CM

## 2021-06-22 DIAGNOSIS — M21.962 ACQUIRED DEFORMITY OF LEFT FOOT: ICD-10-CM

## 2021-06-22 DIAGNOSIS — M21.961 ACQUIRED DEFORMITY OF RIGHT FOOT: ICD-10-CM

## 2021-06-22 PROCEDURE — 99202 OFFICE O/P NEW SF 15 MIN: CPT | Performed by: PODIATRIST

## 2021-06-22 PROCEDURE — 20551 NJX 1 TENDON ORIGIN/INSJ: CPT | Performed by: PODIATRIST

## 2021-06-22 PROCEDURE — L3000 FT INSERT UCB BERKELEY SHELL: HCPCS | Performed by: PODIATRIST

## 2021-06-22 PROCEDURE — 73620 X-RAY EXAM OF FOOT: CPT | Performed by: PODIATRIST

## 2021-06-22 RX ORDER — MELOXICAM 7.5 MG/1
7.5 TABLET ORAL DAILY
Qty: 10 TABLET | Refills: 0 | Status: SHIPPED | OUTPATIENT
Start: 2021-06-22 | End: 2021-06-22

## 2021-06-22 RX ORDER — SULINDAC 200 MG/1
200 TABLET ORAL 2 TIMES DAILY
Qty: 20 TABLET | Refills: 0 | Status: SHIPPED | OUTPATIENT
Start: 2021-06-22 | End: 2022-03-22 | Stop reason: ALTCHOICE

## 2021-06-22 NOTE — PROGRESS NOTES
Assessment/Plan:  Pain upon ambulation  Pronation syndrome  Acquired deformity of foot  Posterior tibial tendinitis left foot  plan  Foot exam performed  X-rays taken  Patient educated on condition  Today 1 25 cc of Kenalog 10 injected into left foot area of posterior tibial tendon insertion  Patient will use Voltaren Gel  She has been placed on Clinoril  Her feet have been casted for custom molded foot orthotics  Diagnoses and all orders for this visit:    Posterior tibial tendinitis of left lower extremity  -     Discontinue: meloxicam (MOBIC) 7 5 mg tablet; Take 1 tablet (7 5 mg total) by mouth daily for 10 days  -     sulindac (CLINORIL) 200 MG tablet; Take 1 tablet (200 mg total) by mouth 2 (two) times a day for 10 days    Acquired deformity of right foot    Acquired deformity of left foot    Acquired flat foot, right    Acquired flat foot, left          Subjective:   Patient has pain in her left foot  This has been ongoing for several weeks  No history of trauma  Allergies   Allergen Reactions    Bee Venom Anaphylaxis     Reaction Date: [id number];   Reaction Date: 23Oct2006;     Meperidine GI Intolerance     DEMEROL vomiting  DEMEROL vomiting    Penicillins      Reaction Date: 25Jul2005;  Annotation - [id number]: as infant - throat closes, difficult breathing         Current Outpatient Medications:     clobetasol (TEMOVATE) 0 05 % cream, Apply topically 2 (two) times a day as needed, Disp: , Rfl:     clonazePAM (KlonoPIN) 0 5 mg tablet, take 1 tablet by mouth three times a day if needed for anxiety, Disp: 90 tablet, Rfl: 0    diclofenac sodium (VOLTAREN) 1 %, Place on the skin, Disp: , Rfl:     omeprazole (PriLOSEC) 40 MG capsule, Take 1 capsule (40 mg total) by mouth daily, Disp: 42 capsule, Rfl: 0    RESTASIS 0 05 % ophthalmic emulsion, , Disp: , Rfl: 0    sertraline (ZOLOFT) 100 mg tablet, Take 1 tablet (100 mg total) by mouth daily, Disp: 90 tablet, Rfl: 1    sulindac (CLINORIL) 200 MG tablet, Take 1 tablet (200 mg total) by mouth 2 (two) times a day for 10 days, Disp: 20 tablet, Rfl: 0    verapamil (VERELAN) 300 MG CP24, take 1 capsule by mouth once daily, Disp: 90 capsule, Rfl: 1    Patient Active Problem List   Diagnosis    Plantar fasciitis    Acquired deformity of right foot    Arthralgia of right foot    Hallux valgus of right foot    Radiculopathy of lumbar region    Congenital pes planus    Tarsal tunnel syndrome, right    Right foot pain    Psoriasis    PMB (postmenopausal bleeding)    Overactive bladder    Lichen sclerosus of female genitalia    Irritable bowel syndrome    Impaired fasting glucose    Allergic rhinitis due to pollen    Generalized anxiety disorder    Benign essential hypertension    Meralgia paresthetica of left side    Intervertebral disc disorder with radiculopathy of lumbosacral region    Severe obesity (BMI 35 0-39  9) with comorbidity ([facility] [address] )    Stage 3a chronic kidney disease    GERD (gastroesophageal reflux disease)          Patient ID: Mirza Husbands is a 64 y o  female  HPI    The following portions of the patient's history were reviewed and updated as appropriate:     family history includes Anxiety disorder in her mother; Arthritis in her father and mother; Atrial fibrillation in her father; Breast cancer (age of onset: 79) in her maternal grandmother; Diabetes in her mother; Heart disease in her mother; No Known Problems in her brother and maternal aunt; Obesity in her sister  reports that she has never smoked  She has never used smokeless tobacco  She reports current alcohol use  No history on file for drug use  Vitals:    06/22/21 1007   BP: 107/81   Pulse: 89   Resp: 17       Review of Systems      Objective:  Patient's shoes and socks removed     Foot Exam    General  General Appearance: appears stated age and healthy   Orientation: alert and oriented to person, place, and time   Affect: appropriate   Gait: antalgic       Right Foot/Ankle     Inspection and Palpation  Ecchymosis: none  Tenderness: bony tenderness   Swelling: dorsum   Arch: pes planus  Hammertoes: fifth toe  Hallux valgus: no  Hallux limitus: yes    Neurovascular  Dorsalis pedis: 3+  Posterior tibial: 3+  Saphenous nerve sensation: normal  Tibial nerve sensation: normal  Superficial peroneal nerve sensation: normal  Deep peroneal nerve sensation: normal  Sural nerve sensation: normal      Left Foot/Ankle      Inspection and Palpation  Ecchymosis: none  Swelling: dorsum   Arch: pes planus  Hammertoes: fifth toe  Hallux valgus: no  Hallux limitus: yes    Neurovascular  Dorsalis pedis: 3+  Posterior tibial: 3+  Saphenous nerve sensation: normal  Tibial nerve sensation: normal  Superficial peroneal nerve sensation: normal  Deep peroneal nerve sensation: normal  Sural nerve sensation: normal        Physical Exam  Vitals and nursing note reviewed  Constitutional:       Appearance: Normal appearance  Cardiovascular:      Rate and Rhythm: Normal rate and regular rhythm  Pulses:           Dorsalis pedis pulses are 3+ on the right side and 3+ on the left side  Posterior tibial pulses are 3+ on the right side and 3+ on the left side  Musculoskeletal:      Right foot: Bony tenderness present  No bunion  Left foot: No bunion  Comments:   Patient is pronated in stance and gait  She has pain with palpation left posterior tibial tendon as well as tibialis anterior tendon insertions of the left foot  X-ray demonstrates os tibialis externum  No evidence of fracture or bony mass  Skin:     Capillary Refill: Capillary refill takes less than 2 seconds  Neurological:      Mental Status: She is alert  Psychiatric:         Mood and Affect: Mood normal          Behavior: Behavior normal          Thought Content:  Thought content normal          Judgment: Judgment normal

## 2021-06-23 DIAGNOSIS — F41.1 GENERALIZED ANXIETY DISORDER: ICD-10-CM

## 2021-06-23 RX ORDER — SERTRALINE HYDROCHLORIDE 100 MG/1
TABLET, FILM COATED ORAL
Qty: 90 TABLET | Refills: 0 | Status: SHIPPED | OUTPATIENT
Start: 2021-06-23 | End: 2021-10-20

## 2021-09-20 ENCOUNTER — OFFICE VISIT (OUTPATIENT)
Dept: FAMILY MEDICINE CLINIC | Facility: CLINIC | Age: 56
End: 2021-09-20
Payer: COMMERCIAL

## 2021-09-20 VITALS
TEMPERATURE: 97.8 F | WEIGHT: 208 LBS | BODY MASS INDEX: 33.43 KG/M2 | DIASTOLIC BLOOD PRESSURE: 82 MMHG | OXYGEN SATURATION: 97 % | RESPIRATION RATE: 16 BRPM | HEIGHT: 66 IN | SYSTOLIC BLOOD PRESSURE: 116 MMHG | HEART RATE: 83 BPM

## 2021-09-20 DIAGNOSIS — R73.01 IMPAIRED FASTING GLUCOSE: ICD-10-CM

## 2021-09-20 DIAGNOSIS — Z23 NEED FOR VACCINATION: ICD-10-CM

## 2021-09-20 DIAGNOSIS — I10 BENIGN ESSENTIAL HYPERTENSION: Primary | ICD-10-CM

## 2021-09-20 DIAGNOSIS — E66.01 SEVERE OBESITY (BMI 35.0-39.9) WITH COMORBIDITY (HCC): ICD-10-CM

## 2021-09-20 PROCEDURE — 90471 IMMUNIZATION ADMIN: CPT

## 2021-09-20 PROCEDURE — 3008F BODY MASS INDEX DOCD: CPT | Performed by: FAMILY MEDICINE

## 2021-09-20 PROCEDURE — 1036F TOBACCO NON-USER: CPT | Performed by: FAMILY MEDICINE

## 2021-09-20 PROCEDURE — 3074F SYST BP LT 130 MM HG: CPT | Performed by: FAMILY MEDICINE

## 2021-09-20 PROCEDURE — 3079F DIAST BP 80-89 MM HG: CPT | Performed by: FAMILY MEDICINE

## 2021-09-20 PROCEDURE — 90682 RIV4 VACC RECOMBINANT DNA IM: CPT

## 2021-09-20 PROCEDURE — 99214 OFFICE O/P EST MOD 30 MIN: CPT | Performed by: FAMILY MEDICINE

## 2021-09-20 RX ORDER — VERAPAMIL HYDROCHLORIDE 240 MG/1
240 CAPSULE, EXTENDED RELEASE ORAL DAILY
Qty: 90 CAPSULE | Refills: 1 | Status: SHIPPED | OUTPATIENT
Start: 2021-09-20 | End: 2022-03-22 | Stop reason: SDUPTHER

## 2021-09-20 NOTE — PROGRESS NOTES
Assessment/Plan:    1  Benign essential hypertension  Assessment & Plan:  Pressure is running low due to weight loss  Dose of verapamil decreased from 300 to 240 mg     Orders:  -     CBC; Future; Expected date: 03/04/2022  -     Comprehensive metabolic panel; Future; Expected date: 03/04/2022  -     Lipid Panel with Direct LDL reflex; Future; Expected date: 03/04/2022  -     TSH, 3rd generation; Future; Expected date: 03/04/2022  -     verapamil (VERELAN) 240 MG 24 hr capsule; Take 1 capsule (240 mg total) by mouth daily  -     CBC  -     Comprehensive metabolic panel  -     Lipid Panel with Direct LDL reflex  -     TSH, 3rd generation    2  Impaired fasting glucose  Assessment & Plan:  Had has been stable   Will check labs in 6 months    Orders:  -     Hemoglobin A1C; Future; Expected date: 03/04/2022  -     Hemoglobin A1C    3  Severe obesity (BMI 35 0-39  9) with comorbidity [facility])  Assessment & Plan:  Improving   Encouraged her to continue with current diet and exercise plan      4  Need for vaccination  -     influenza vaccine, quadrivalent, recombinant, PF, 0 5 mL, for patients 18 yr+ (FLUBLOK)          There are no Patient Instructions on file for this visit  Return in about 6 months (around 3/20/2022) for Annual physical     Subjective:      Patient ID: Matilde Case is a 64 y o  female  Chief Complaint   Patient presents with    Follow-up     6 month mz cma       Patient reports that she has been watching her diet and trying to eat healthier  She has had episode of feeling lightheaded and dizzy  The following portions of the patient's history were reviewed and updated as appropriate: allergies, current medications, past family history, past medical history, past social history, past surgical history and problem list     Review of Systems   Respiratory: Negative  Cardiovascular: Negative            Current Outpatient Medications   Medication Sig Dispense Refill    clobetasol (TEMOVATE) Sepsis Note   Reynaldo Sanders 46 y o  female MRN: [id number]  Unit/Bed#: ED 13 Encounter: [id number]      qSOFA     Row Name 11/25/20 2325 11/25/20 2130 11/25/20 2100 11/25/20 2030 11/25/20 1915    Altered mental status GCS < 15  --  --  --  --  0    Respiratory Rate > / =22  0  0  0  1  --    Systolic BP < / =058  1  0  0  0  --    Q Sofa Score  1  0  0  1  1    Row Name 11/25/20 1913                Altered mental status GCS < 15  --        Respiratory Rate > / =20  1        Systolic BP < / =244  0        Q Sofa Score  1            Initial Sepsis Screening     Row Name 11/25/20 2329                Is the patient's history suggestive of a new or worsening infection? (!) Yes (Proceed)  -KM        Suspected source of infection  pneumonia  -KM        Are two or more of the following signs & symptoms of infection both present and new to the patient? (!) Yes (Proceed)  -KM        Indicate SIRS criteria  Tachypnea > 20 resp per min; Tachycardia > 90 bpm  -KM        If the answer is yes to both questions, suspicion of sepsis is present  --        If severe sepsis is present AND tissue hypoperfusion perists in the hour after fluid resuscitation or lactate > 4, the patient meets criteria for SEPTIC SHOCK  --        Are any of the following organ dysfunction criteria present within 6 hours of suspected infection and SIRS criteria that are NOT considered to be chronic conditions?   No  -KM        Organ dysfunction  --        Date of presentation of severe sepsis  --        Time of presentation of severe sepsis  --        Tissue hypoperfusion persists in the hour after crystalloid fluid administration, evidenced, by either:  --        Was hypotension present within one hour of the conclusion of crystalloid fluid administration?  --        Date of presentation of septic shock  --        Time of presentation of septic shock  --          User Key  (r) = Recorded By, (t) = Taken By, (c) = Cosigned By    [address] Name Provider Type 0 05 % cream Apply topically 2 (two) times a day as needed      clonazePAM (KlonoPIN) 0 5 mg tablet take 1 tablet by mouth three times a day if needed for anxiety 90 tablet 0    diclofenac sodium (VOLTAREN) 1 % Place on the skin      RESTASIS 0 05 % ophthalmic emulsion   0    sertraline (ZOLOFT) 100 mg tablet take 1 tablet by mouth once daily 90 tablet 0    verapamil (VERELAN) 240 MG 24 hr capsule Take 1 capsule (240 mg total) by mouth daily 90 capsule 1    sulindac (CLINORIL) 200 MG tablet Take 1 tablet (200 mg total) by mouth 2 (two) times a day for 10 days 20 tablet 0     No current facility-administered medications for this visit  Objective:    /82   Pulse 83   Temp 97 8 °F (36 6 °C)   Resp 16   Ht 5' 6" (1 676 m)   Wt 94 3 kg (208 lb)   SpO2 97%   BMI 33 57 kg/m²        Physical Exam  Vitals and nursing note reviewed  Constitutional:       Appearance: She is well-developed  HENT:      Head: Normocephalic and atraumatic  Right Ear: Tympanic membrane and external ear normal       Left Ear: Tympanic membrane and external ear normal    Cardiovascular:      Rate and Rhythm: Normal rate and regular rhythm  Heart sounds: Normal heart sounds  No murmur heard  No friction rub  Pulmonary:      Effort: Pulmonary effort is normal  No respiratory distress  Breath sounds: Normal breath sounds  No wheezing or rales  Musculoskeletal:      Right lower leg: No edema  Left lower leg: No edema                  Kirby Martin DO [address], CRNP Nurse Practitioner

## 2021-10-08 ENCOUNTER — TELEPHONE (OUTPATIENT)
Dept: FAMILY MEDICINE CLINIC | Facility: CLINIC | Age: 56
End: 2021-10-08

## 2021-10-08 DIAGNOSIS — R92.8 ABNORMAL MAMMOGRAM OF LEFT BREAST: Primary | ICD-10-CM

## 2021-10-08 DIAGNOSIS — Z12.31 ENCOUNTER FOR SCREENING MAMMOGRAM FOR BREAST CANCER: ICD-10-CM

## 2021-10-19 DIAGNOSIS — F41.1 GENERALIZED ANXIETY DISORDER: ICD-10-CM

## 2021-10-20 RX ORDER — SERTRALINE HYDROCHLORIDE 100 MG/1
TABLET, FILM COATED ORAL
Qty: 90 TABLET | Refills: 1 | Status: SHIPPED | OUTPATIENT
Start: 2021-10-20 | End: 2022-06-03

## 2021-11-01 ENCOUNTER — OFFICE VISIT (OUTPATIENT)
Dept: OBGYN CLINIC | Facility: CLINIC | Age: 56
End: 2021-11-01
Payer: COMMERCIAL

## 2021-11-01 VITALS
WEIGHT: 207 LBS | HEIGHT: 66 IN | BODY MASS INDEX: 33.27 KG/M2 | DIASTOLIC BLOOD PRESSURE: 80 MMHG | SYSTOLIC BLOOD PRESSURE: 106 MMHG

## 2021-11-01 DIAGNOSIS — Z01.419 WOMEN'S ANNUAL ROUTINE GYNECOLOGICAL EXAMINATION: ICD-10-CM

## 2021-11-01 DIAGNOSIS — N39.46 MIXED STRESS AND URGE URINARY INCONTINENCE: Primary | ICD-10-CM

## 2021-11-01 DIAGNOSIS — K59.09 OTHER CONSTIPATION: ICD-10-CM

## 2021-11-01 PROCEDURE — 99386 PREV VISIT NEW AGE 40-64: CPT | Performed by: STUDENT IN AN ORGANIZED HEALTH CARE EDUCATION/TRAINING PROGRAM

## 2021-11-01 PROCEDURE — 3008F BODY MASS INDEX DOCD: CPT | Performed by: STUDENT IN AN ORGANIZED HEALTH CARE EDUCATION/TRAINING PROGRAM

## 2021-11-01 PROCEDURE — 1036F TOBACCO NON-USER: CPT | Performed by: STUDENT IN AN ORGANIZED HEALTH CARE EDUCATION/TRAINING PROGRAM

## 2021-11-01 RX ORDER — POLYETHYLENE GLYCOL 3350 17 G/17G
17 POWDER, FOR SOLUTION ORAL DAILY
Qty: 14 EACH | Refills: 0
Start: 2021-11-01

## 2021-11-01 RX ORDER — CHLORAL HYDRATE 500 MG
1000 CAPSULE ORAL DAILY
COMMUNITY

## 2021-11-01 RX ORDER — DOCUSATE SODIUM 100 MG/1
100 CAPSULE, LIQUID FILLED ORAL 2 TIMES DAILY PRN
Qty: 60 CAPSULE | Refills: 0
Start: 2021-11-01

## 2021-11-01 RX ORDER — MELATONIN
1000 DAILY
Qty: 30 TABLET | Refills: 0
Start: 2021-11-01

## 2021-11-10 ENCOUNTER — HOSPITAL ENCOUNTER (OUTPATIENT)
Dept: RADIOLOGY | Facility: HOSPITAL | Age: 56
Discharge: HOME | End: 2021-11-10
Payer: COMMERCIAL

## 2021-11-10 VITALS — BODY MASS INDEX: 32.47 KG/M2 | HEIGHT: 66 IN | WEIGHT: 202 LBS

## 2021-11-10 DIAGNOSIS — R92.8 ABNORMAL MAMMOGRAM OF LEFT BREAST: ICD-10-CM

## 2021-11-10 PROCEDURE — 76642 ULTRASOUND BREAST LIMITED: CPT

## 2021-11-10 PROCEDURE — 77065 DX MAMMO INCL CAD UNI: CPT

## 2021-11-10 PROCEDURE — G0279 TOMOSYNTHESIS, MAMMO: HCPCS

## 2021-11-17 ENCOUNTER — PROCEDURE VISIT (OUTPATIENT)
Dept: OBGYN CLINIC | Facility: CLINIC | Age: 56
End: 2021-11-17
Payer: COMMERCIAL

## 2021-11-17 VITALS
WEIGHT: 205 LBS | DIASTOLIC BLOOD PRESSURE: 80 MMHG | TEMPERATURE: 98.3 F | SYSTOLIC BLOOD PRESSURE: 110 MMHG | BODY MASS INDEX: 33.09 KG/M2

## 2021-11-17 DIAGNOSIS — N90.89 VULVAR LESION: Primary | ICD-10-CM

## 2021-11-18 PROCEDURE — 56605 BIOPSY OF VULVA/PERINEUM: CPT | Performed by: OBSTETRICS & GYNECOLOGY

## 2021-11-24 LAB
PATH REPORT.COMMENTS IMP SPEC: NORMAL
PATH REPORT.SITE OF ORIGIN SPEC: NORMAL
PAYMENT PROCEDURE: NORMAL
SL AMB .: NORMAL

## 2022-03-22 ENCOUNTER — OFFICE VISIT (OUTPATIENT)
Dept: FAMILY MEDICINE CLINIC | Facility: CLINIC | Age: 57
End: 2022-03-22
Payer: COMMERCIAL

## 2022-03-22 VITALS
WEIGHT: 201.8 LBS | RESPIRATION RATE: 18 BRPM | TEMPERATURE: 98.9 F | SYSTOLIC BLOOD PRESSURE: 112 MMHG | DIASTOLIC BLOOD PRESSURE: 74 MMHG | HEIGHT: 66 IN | OXYGEN SATURATION: 99 % | HEART RATE: 85 BPM | BODY MASS INDEX: 32.43 KG/M2

## 2022-03-22 DIAGNOSIS — I10 BENIGN ESSENTIAL HYPERTENSION: ICD-10-CM

## 2022-03-22 DIAGNOSIS — R73.01 IMPAIRED FASTING GLUCOSE: Primary | ICD-10-CM

## 2022-03-22 DIAGNOSIS — F41.1 GENERALIZED ANXIETY DISORDER: ICD-10-CM

## 2022-03-22 PROBLEM — M20.11 HALLUX VALGUS OF RIGHT FOOT: Status: RESOLVED | Noted: 2018-04-13 | Resolved: 2022-03-22

## 2022-03-22 PROBLEM — M25.571 ARTHRALGIA OF RIGHT FOOT: Status: RESOLVED | Noted: 2018-04-13 | Resolved: 2022-03-22

## 2022-03-22 PROBLEM — M79.671 RIGHT FOOT PAIN: Status: RESOLVED | Noted: 2018-05-18 | Resolved: 2022-03-22

## 2022-03-22 PROBLEM — E66.01 SEVERE OBESITY (BMI 35.0-39.9) WITH COMORBIDITY (HCC): Status: RESOLVED | Noted: 2020-01-20 | Resolved: 2022-03-22

## 2022-03-22 PROBLEM — Q66.50 CONGENITAL PES PLANUS: Status: RESOLVED | Noted: 2018-04-13 | Resolved: 2022-03-22

## 2022-03-22 PROBLEM — M72.2 PLANTAR FASCIITIS: Status: RESOLVED | Noted: 2018-04-13 | Resolved: 2022-03-22

## 2022-03-22 PROBLEM — M21.961 ACQUIRED DEFORMITY OF RIGHT FOOT: Status: RESOLVED | Noted: 2018-04-13 | Resolved: 2022-03-22

## 2022-03-22 PROCEDURE — 3078F DIAST BP <80 MM HG: CPT | Performed by: FAMILY MEDICINE

## 2022-03-22 PROCEDURE — 3074F SYST BP LT 130 MM HG: CPT | Performed by: FAMILY MEDICINE

## 2022-03-22 PROCEDURE — 3725F SCREEN DEPRESSION PERFORMED: CPT | Performed by: FAMILY MEDICINE

## 2022-03-22 PROCEDURE — 3008F BODY MASS INDEX DOCD: CPT | Performed by: FAMILY MEDICINE

## 2022-03-22 PROCEDURE — 1036F TOBACCO NON-USER: CPT | Performed by: FAMILY MEDICINE

## 2022-03-22 PROCEDURE — 99214 OFFICE O/P EST MOD 30 MIN: CPT | Performed by: FAMILY MEDICINE

## 2022-03-22 RX ORDER — VERAPAMIL HYDROCHLORIDE 120 MG/1
120 CAPSULE, EXTENDED RELEASE ORAL DAILY
Qty: 90 CAPSULE | Refills: 1 | Status: SHIPPED | OUTPATIENT
Start: 2022-03-22

## 2022-03-22 NOTE — PROGRESS NOTES
Assessment/Plan:    1  Impaired fasting glucose  Assessment & Plan:  Stable    Orders:  -     Hemoglobin A1C; Future  -     Hemoglobin A1C    2  Benign essential hypertension  Assessment & Plan:  Pressure is running low  Dose of verapamil decreased from 240 to 120    Orders:  -     verapamil (VERELAN PM) 120 MG 24 hr capsule; Take 1 capsule (120 mg total) by mouth daily  -     CBC; Future  -     Comprehensive metabolic panel; Future  -     Lipid Panel with Direct LDL reflex; Future  -     TSH, 3rd generation; Future  -     CBC  -     Comprehensive metabolic panel  -     Lipid Panel with Direct LDL reflex  -     TSH, 3rd generation    3  Generalized anxiety disorder  Assessment & Plan:  Stable  Continue sertraline 100 mg a day      BMI Counseling: Body mass index is 32 57 kg/m²  The BMI is above normal  Exercise recommendations include exercising 3-5 times per week  Rationale for BMI follow-up plan is due to patient being overweight or obese  Depression Screening and Follow-up Plan: Patient was screened for depression during today's encounter  They screened negative with a PHQ-2 score of 0  There are no Patient Instructions on file for this visit  Return in about 6 months (around 9/22/2022) for Next scheduled follow up  Subjective:      Patient ID: Lewis Oropeza is a 62 y o  female  Chief Complaint   Patient presents with    Follow-up     6 month f/u nm lpn       She is still exercising 4 times a week  She has continued following healthy lifestyle  She is still feeling dizzy when she stands up  It feels like she needs less medication         The following portions of the patient's history were reviewed and updated as appropriate: allergies, current medications, past family history, past medical history, past social history, past surgical history and problem list     Review of Systems      Current Outpatient Medications   Medication Sig Dispense Refill    cholecalciferol (VITAMIN D3) 1,000 units tablet Take 1 tablet (1,000 Units total) by mouth daily 30 tablet 0    clobetasol (TEMOVATE) 0 05 % cream Apply topically 2 (two) times a day as needed      clonazePAM (KlonoPIN) 0 5 mg tablet take 1 tablet by mouth three times a day if needed for anxiety 90 tablet 0    diclofenac sodium (VOLTAREN) 1 % Place on the skin      docusate sodium (COLACE) 100 mg capsule Take 1 capsule (100 mg total) by mouth 2 (two) times a day as needed for constipation 60 capsule 0    Omega-3 Fatty Acids (fish oil) 1,000 mg Take 1,000 mg by mouth daily      polyethylene glycol (MIRALAX) 17 g packet Take 17 g by mouth daily (Patient taking differently: Take 17 g by mouth if needed  ) 14 each 0    RESTASIS 0 05 % ophthalmic emulsion   0    sertraline (ZOLOFT) 100 mg tablet take 1 tablet by mouth once daily 90 tablet 1    verapamil (VERELAN PM) 120 MG 24 hr capsule Take 1 capsule (120 mg total) by mouth daily 90 capsule 1     No current facility-administered medications for this visit  Objective:    /74   Pulse 85   Temp 98 9 °F (37 2 °C)   Resp 18   Ht 5' 6" (1 676 m)   Wt 91 5 kg (201 lb 12 8 oz)   SpO2 99%   BMI 32 57 kg/m²        Physical Exam  Vitals and nursing note reviewed  Constitutional:       Appearance: She is well-developed  HENT:      Head: Normocephalic and atraumatic  Right Ear: Tympanic membrane and external ear normal       Left Ear: Tympanic membrane and external ear normal    Cardiovascular:      Rate and Rhythm: Normal rate and regular rhythm  Heart sounds: Normal heart sounds  No murmur heard  No friction rub  Pulmonary:      Effort: Pulmonary effort is normal  No respiratory distress  Breath sounds: Normal breath sounds  No wheezing or rales  Musculoskeletal:      Right lower leg: No edema  Left lower leg: No edema                  Milo Cocker, DO

## 2022-03-31 LAB
ALBUMIN SERPL-MCNC: 4.4 G/DL (ref 3.8–4.9)
ALBUMIN/GLOB SERPL: 1.6 {RATIO} (ref 1.2–2.2)
ALP SERPL-CCNC: 77 IU/L (ref 44–121)
ALT SERPL-CCNC: 13 IU/L (ref 0–32)
AST SERPL-CCNC: 17 IU/L (ref 0–40)
BILIRUB SERPL-MCNC: 0.5 MG/DL (ref 0–1.2)
BUN SERPL-MCNC: 15 MG/DL (ref 6–24)
BUN/CREAT SERPL: 15 (ref 9–23)
CALCIUM SERPL-MCNC: 9.5 MG/DL (ref 8.7–10.2)
CHLORIDE SERPL-SCNC: 101 MMOL/L (ref 96–106)
CHOLEST SERPL-MCNC: 210 MG/DL (ref 100–199)
CO2 SERPL-SCNC: 22 MMOL/L (ref 20–29)
CREAT SERPL-MCNC: 0.98 MG/DL (ref 0.57–1)
EGFR: 67 ML/MIN/1.73
ERYTHROCYTE [DISTWIDTH] IN BLOOD BY AUTOMATED COUNT: 13.7 % (ref 11.7–15.4)
EST. AVERAGE GLUCOSE BLD GHB EST-MCNC: 105 MG/DL
GLOBULIN SER-MCNC: 2.7 G/DL (ref 1.5–4.5)
GLUCOSE SERPL-MCNC: 91 MG/DL (ref 65–99)
HBA1C MFR BLD: 5.3 % (ref 4.8–5.6)
HCT VFR BLD AUTO: 42 % (ref 34–46.6)
HDLC SERPL-MCNC: 59 MG/DL
HGB BLD-MCNC: 14.4 G/DL (ref 11.1–15.9)
LDLC SERPL CALC-MCNC: 127 MG/DL (ref 0–99)
LDLC/HDLC SERPL: 2.2 RATIO (ref 0–3.2)
MCH RBC QN AUTO: 28.3 PG (ref 26.6–33)
MCHC RBC AUTO-ENTMCNC: 34.3 G/DL (ref 31.5–35.7)
MCV RBC AUTO: 83 FL (ref 79–97)
MICRODELETION SYND BLD/T FISH: NORMAL
PLATELET # BLD AUTO: 298 X10E3/UL (ref 150–450)
POTASSIUM SERPL-SCNC: 4.4 MMOL/L (ref 3.5–5.2)
PROT SERPL-MCNC: 7.1 G/DL (ref 6–8.5)
RBC # BLD AUTO: 5.09 X10E6/UL (ref 3.77–5.28)
SL AMB VLDL CHOLESTEROL CALC: 24 MG/DL (ref 5–40)
SODIUM SERPL-SCNC: 138 MMOL/L (ref 134–144)
TRIGL SERPL-MCNC: 134 MG/DL (ref 0–149)
TSH SERPL DL<=0.005 MIU/L-ACNC: 2.85 UIU/ML (ref 0.45–4.5)
WBC # BLD AUTO: 5.7 X10E3/UL (ref 3.4–10.8)

## 2022-04-11 ENCOUNTER — OFFICE VISIT (OUTPATIENT)
Dept: FAMILY MEDICINE CLINIC | Facility: CLINIC | Age: 57
End: 2022-04-11
Payer: COMMERCIAL

## 2022-04-11 VITALS
SYSTOLIC BLOOD PRESSURE: 110 MMHG | TEMPERATURE: 97.8 F | HEART RATE: 88 BPM | WEIGHT: 201 LBS | DIASTOLIC BLOOD PRESSURE: 74 MMHG | HEIGHT: 66 IN | RESPIRATION RATE: 16 BRPM | BODY MASS INDEX: 32.3 KG/M2

## 2022-04-11 DIAGNOSIS — J01.90 ACUTE SINUSITIS, RECURRENCE NOT SPECIFIED, UNSPECIFIED LOCATION: Primary | ICD-10-CM

## 2022-04-11 PROCEDURE — 3008F BODY MASS INDEX DOCD: CPT | Performed by: FAMILY MEDICINE

## 2022-04-11 PROCEDURE — 3078F DIAST BP <80 MM HG: CPT | Performed by: FAMILY MEDICINE

## 2022-04-11 PROCEDURE — 1036F TOBACCO NON-USER: CPT | Performed by: FAMILY MEDICINE

## 2022-04-11 PROCEDURE — 3074F SYST BP LT 130 MM HG: CPT | Performed by: FAMILY MEDICINE

## 2022-04-11 PROCEDURE — 99213 OFFICE O/P EST LOW 20 MIN: CPT | Performed by: FAMILY MEDICINE

## 2022-04-11 RX ORDER — AZITHROMYCIN 250 MG/1
TABLET, FILM COATED ORAL
Qty: 6 TABLET | Refills: 0 | Status: SHIPPED | OUTPATIENT
Start: 2022-04-11 | End: 2022-04-16

## 2022-04-11 NOTE — PROGRESS NOTES
Assessment/Plan:       Diagnoses and all orders for this visit:    Acute sinusitis, recurrence not specified, unspecified location  -     azithromycin (Zithromax) 250 mg tablet; Take 2 tablets (500 mg total) by mouth daily for 1 day, THEN 1 tablet (250 mg total) daily for 4 days  - Counseled on supportive care including rest, hydration, nasal saline  She will start Mucinex and Flonase  Subjective:      Patient ID: Katarina Lama is a 62 y o  female  Sinusitis  This is a new problem  The current episode started yesterday  The problem has been gradually worsening since onset  The maximum temperature recorded prior to her arrival was 101 - 101 9 F  Her pain is at a severity of 5/10  Associated symptoms include chills, congestion, coughing, ear pain, headaches, a hoarse voice, sinus pressure and sneezing  Pertinent negatives include no diaphoresis, neck pain, shortness of breath, sore throat or swollen glands  Past treatments include acetaminophen  The treatment provided mild relief  The following portions of the patient's history were reviewed and updated as appropriate: allergies, current medications, past family history, past medical history, past social history, past surgical history and problem list     Review of Systems   Constitutional: Positive for chills  Negative for diaphoresis  HENT: Positive for congestion, ear pain, hoarse voice, sinus pressure and sneezing  Negative for sore throat  Respiratory: Positive for cough  Negative for shortness of breath  Musculoskeletal: Negative for neck pain  Neurological: Positive for headaches  Objective:      /74   Pulse 88   Temp 97 8 °F (36 6 °C)   Resp 16   Ht 5' 6" (1 676 m)   Wt 91 2 kg (201 lb)   BMI 32 44 kg/m²          Physical Exam  Constitutional:       General: She is not in acute distress  Appearance: She is well-developed  She is not diaphoretic  HENT:      Head: Normocephalic and atraumatic        Right Ear: Ear canal and external ear normal  A middle ear effusion is present  There is no impacted cerumen  Left Ear: Ear canal and external ear normal  A middle ear effusion is present  There is no impacted cerumen  Nose: Nose normal  No congestion or rhinorrhea  Mouth/Throat:      Mouth: Mucous membranes are moist       Pharynx: Oropharynx is clear  No oropharyngeal exudate or posterior oropharyngeal erythema  Eyes:      General: No scleral icterus  Right eye: No discharge  Left eye: No discharge  Conjunctiva/sclera: Conjunctivae normal    Cardiovascular:      Rate and Rhythm: Normal rate and regular rhythm  Heart sounds: Normal heart sounds  No murmur heard  No friction rub  No gallop  Pulmonary:      Effort: Pulmonary effort is normal  No respiratory distress  Breath sounds: Normal breath sounds  No wheezing or rales  Chest:      Chest wall: No tenderness  Musculoskeletal:         General: No deformity  Normal range of motion  Cervical back: Normal range of motion and neck supple  Skin:     General: Skin is warm and dry  Neurological:      Mental Status: She is alert and oriented to person, place, and time  Psychiatric:         Behavior: Behavior normal          Thought Content:  Thought content normal          Judgment: Judgment normal

## 2022-06-03 DIAGNOSIS — F41.1 GENERALIZED ANXIETY DISORDER: ICD-10-CM

## 2022-06-03 RX ORDER — SERTRALINE HYDROCHLORIDE 100 MG/1
TABLET, FILM COATED ORAL
Qty: 90 TABLET | Refills: 1 | Status: SHIPPED | OUTPATIENT
Start: 2022-06-03

## 2022-06-10 ENCOUNTER — TELEPHONE (OUTPATIENT)
Dept: FAMILY MEDICINE CLINIC | Facility: CLINIC | Age: 57
End: 2022-06-10

## 2022-06-22 ENCOUNTER — OFFICE VISIT (OUTPATIENT)
Dept: PODIATRY | Facility: CLINIC | Age: 57
End: 2022-06-22
Payer: COMMERCIAL

## 2022-06-22 VITALS
RESPIRATION RATE: 16 BRPM | SYSTOLIC BLOOD PRESSURE: 110 MMHG | DIASTOLIC BLOOD PRESSURE: 74 MMHG | BODY MASS INDEX: 32.3 KG/M2 | WEIGHT: 201 LBS | HEIGHT: 66 IN

## 2022-06-22 DIAGNOSIS — M72.2 PLANTAR FASCIITIS: Primary | ICD-10-CM

## 2022-06-22 DIAGNOSIS — M21.961 ACQUIRED DEFORMITY OF RIGHT FOOT: ICD-10-CM

## 2022-06-22 DIAGNOSIS — M79.671 RIGHT FOOT PAIN: ICD-10-CM

## 2022-06-22 PROCEDURE — 99212 OFFICE O/P EST SF 10 MIN: CPT | Performed by: PODIATRIST

## 2022-06-22 PROCEDURE — 20550 NJX 1 TENDON SHEATH/LIGAMENT: CPT | Performed by: PODIATRIST

## 2022-06-22 RX ORDER — MELOXICAM 7.5 MG/1
7.5 TABLET ORAL DAILY
Qty: 10 TABLET | Refills: 0 | Status: SHIPPED | OUTPATIENT
Start: 2022-06-22 | End: 2022-07-02

## 2022-06-22 NOTE — PROGRESS NOTES
Assessment/Plan:  Heel spur syndrome/plantar fasciitis right foot  Acquired deformity foot  History of posterior tibial tendinitis right    Plan  Foot exam performed  Patient educated on condition  Today right heel trigger point injection done  1 25 cc Kenalog 10 injected into right heel without pain or complication  Patient be placed on Mobic  Return p r n  Diagnoses and all orders for this visit:    Plantar fasciitis  -     meloxicam (MOBIC) 7 5 mg tablet; Take 1 tablet (7 5 mg total) by mouth daily for 10 days    Right foot pain  -     meloxicam (MOBIC) 7 5 mg tablet; Take 1 tablet (7 5 mg total) by mouth daily for 10 days    Acquired deformity of right foot          Subjective:  Patient has been active  She now has right heel pain  Pain upon rising  No history of trauma    Allergies   Allergen Reactions    Bee Venom Anaphylaxis     Reaction Date: [id number];   Reaction Date: 23Oct2006;     Meperidine GI Intolerance     DEMEROL vomiting  DEMEROL vomiting    Penicillins      Reaction Date: 25Jul2005;  Annotation - [id number]: as infant - throat closes, difficult breathing         Current Outpatient Medications:     meloxicam (MOBIC) 7 5 mg tablet, Take 1 tablet (7 5 mg total) by mouth daily for 10 days, Disp: 10 tablet, Rfl: 0    cholecalciferol (VITAMIN D3) 1,000 units tablet, Take 1 tablet (1,000 Units total) by mouth daily, Disp: 30 tablet, Rfl: 0    clobetasol (TEMOVATE) 0 05 % cream, Apply topically 2 (two) times a day as needed, Disp: , Rfl:     clonazePAM (KlonoPIN) 0 5 mg tablet, take 1 tablet by mouth three times a day if needed for anxiety, Disp: 90 tablet, Rfl: 0    diclofenac sodium (VOLTAREN) 1 %, Place on the skin, Disp: , Rfl:     docusate sodium (COLACE) 100 mg capsule, Take 1 capsule (100 mg total) by mouth 2 (two) times a day as needed for constipation, Disp: 60 capsule, Rfl: 0    Omega-3 Fatty Acids (fish oil) 1,000 mg, Take 1,000 mg by mouth daily, Disp: , Rfl:    polyethylene glycol (MIRALAX) 17 g packet, Take 17 g by mouth daily (Patient taking differently: Take 17 g by mouth if needed  ), Disp: 14 each, Rfl: 0    RESTASIS 0 05 % ophthalmic emulsion, Administer to both eyes every 12 (twelve) hours  , Disp: , Rfl: 0    sertraline (ZOLOFT) 100 mg tablet, take 1 tablet by mouth once daily, Disp: 90 tablet, Rfl: 1    verapamil (VERELAN PM) 120 MG 24 hr capsule, Take 1 capsule (120 mg total) by mouth daily, Disp: 90 capsule, Rfl: 1    Patient Active Problem List   Diagnosis    Radiculopathy of lumbar region    Tarsal tunnel syndrome, right    Psoriasis    PMB (postmenopausal bleeding)    Overactive bladder    Lichen sclerosus of female genitalia    Irritable bowel syndrome    Impaired fasting glucose    Allergic rhinitis due to pollen    Generalized anxiety disorder    Benign essential hypertension    Meralgia paresthetica of left side    Intervertebral disc disorder with radiculopathy of lumbosacral region    Stage 3a chronic kidney disease    GERD (gastroesophageal reflux disease)          Patient ID: Gi Caro is a 62 y o  female  HPI    The following portions of the patient's history were reviewed and updated as appropriate:     family history includes Anxiety disorder in her mother; Arthritis in her father and mother; Atrial fibrillation in her father; Breast cancer (age of onset: 79) in her maternal grandmother; Diabetes in her mother; Heart disease in her mother; No Known Problems in her brother and maternal aunt; Obesity in her sister  reports that she has never smoked  She has never used smokeless tobacco  She reports current alcohol use  She reports that she does not use drugs  Vitals:    06/22/22 1546   BP: 110/74   Resp: 16       Review of Systems      Objective:  Patient's shoes and socks removed     Foot ExamPhysical Exam      Foot Exam     General  General Appearance: appears stated age and healthy   Orientation: alert and oriented to person, place, and time   Affect: appropriate   Gait: antalgic         Right Foot/Ankle      Inspection and Palpation  Ecchymosis: none  Tenderness: bony tenderness   Swelling: dorsum   Arch: pes planus  Hammertoes: fifth toe  Hallux valgus: no  Hallux limitus: yes     Neurovascular  Dorsalis pedis: 3+  Posterior tibial: 3+  Saphenous nerve sensation: normal  Tibial nerve sensation: normal  Superficial peroneal nerve sensation: normal  Deep peroneal nerve sensation: normal  Sural nerve sensation: normal        Left Foot/Ankle       Inspection and Palpation  Ecchymosis: none  Swelling: dorsum   Arch: pes planus  Hammertoes: fifth toe  Hallux valgus: no  Hallux limitus: yes     Neurovascular  Dorsalis pedis: 3+  Posterior tibial: 3+  Saphenous nerve sensation: normal  Tibial nerve sensation: normal  Superficial peroneal nerve sensation: normal  Deep peroneal nerve sensation: normal  Sural nerve sensation: normal           Physical Exam  Vitals and nursing note reviewed  Constitutional:       Appearance: Normal appearance  Cardiovascular:      Rate and Rhythm: Normal rate and regular rhythm  Pulses:           Dorsalis pedis pulses are 3+ on the right side and 3+ on the left side  Posterior tibial pulses are 3+ on the right side and 3+ on the left side  Musculoskeletal:      Right foot: Bony tenderness present  No bunion  Pain with palpation plantar fascia insertion right foot     Left foot: No bunion  Comments:   Patient is pronated in stance and gait  She has pain with palpation left posterior tibial tendon as well as tibialis anterior tendon insertions of the left foot  X-ray demonstrates os tibialis externum  No evidence of fracture or bony mass  Skin:     Capillary Refill: Capillary refill takes less than 2 seconds  Neurological:      Mental Status: She is alert     Psychiatric:         Mood and Affect: Mood normal          Behavior: Behavior normal          Thought Content:  Thought content normal          Judgment: Judgment normal

## 2022-07-11 ENCOUNTER — OFFICE VISIT (OUTPATIENT)
Dept: PODIATRY | Facility: CLINIC | Age: 57
End: 2022-07-11
Payer: COMMERCIAL

## 2022-07-11 VITALS — WEIGHT: 201 LBS | BODY MASS INDEX: 32.3 KG/M2 | HEIGHT: 66 IN | RESPIRATION RATE: 17 BRPM

## 2022-07-11 DIAGNOSIS — M72.2 PLANTAR FASCIITIS: Primary | ICD-10-CM

## 2022-07-11 DIAGNOSIS — M79.671 RIGHT FOOT PAIN: ICD-10-CM

## 2022-07-11 PROCEDURE — 20550 NJX 1 TENDON SHEATH/LIGAMENT: CPT | Performed by: PODIATRIST

## 2022-07-11 RX ORDER — SULINDAC 200 MG/1
200 TABLET ORAL 2 TIMES DAILY
Qty: 40 TABLET | Refills: 0 | Status: SHIPPED | OUTPATIENT
Start: 2022-07-11 | End: 2022-09-20 | Stop reason: ALTCHOICE

## 2022-07-11 NOTE — PROGRESS NOTES
Assessment/Plan:  Heel spur syndrome/plantar fasciitis right foot  Acquired deformity foot  History of posterior tibial tendinitis right     Plan  Foot exam performed  Patient educated on condition  Today right heel trigger point injection done  1 25 cc Kenalog 10 injected into right heel without pain or complication  Patient be placed on Mobic  Return p r n  Patient be placed on Lodine            Diagnoses and all orders for this visit:     Plantar fasciitis  -       Right foot pain  -     Acquired deformity of right foot            Subjective:  Patient has been active  She she still continues to have right heel pain  It is much better however she still has residual pain            Allergies   Allergen Reactions    Bee Venom Anaphylaxis       Reaction Date: 23Oct2006;   Reaction Date: 23Oct2006;     Meperidine GI Intolerance       DEMEROL vomiting  DEMEROL vomiting    Penicillins         Reaction Date: 25Jul2005;  Annotation - [id number]: as infant - throat closes, difficult breathing            Current Outpatient Medications:     meloxicam (MOBIC) 7 5 mg tablet, Take 1 tablet (7 5 mg total) by mouth daily for 10 days, Disp: 10 tablet, Rfl: 0    cholecalciferol (VITAMIN D3) 1,000 units tablet, Take 1 tablet (1,000 Units total) by mouth daily, Disp: 30 tablet, Rfl: 0    clobetasol (TEMOVATE) 0 05 % cream, Apply topically 2 (two) times a day as needed, Disp: , Rfl:     clonazePAM (KlonoPIN) 0 5 mg tablet, take 1 tablet by mouth three times a day if needed for anxiety, Disp: 90 tablet, Rfl: 0    diclofenac sodium (VOLTAREN) 1 %, Place on the skin, Disp: , Rfl:     docusate sodium (COLACE) 100 mg capsule, Take 1 capsule (100 mg total) by mouth 2 (two) times a day as needed for constipation, Disp: 60 capsule, Rfl: 0    Omega-3 Fatty Acids (fish oil) 1,000 mg, Take 1,000 mg by mouth daily, Disp: , Rfl:     polyethylene glycol (MIRALAX) 17 g packet, Take 17 g by mouth daily (Patient taking differently: Take 17 g by mouth if needed  ), Disp: 14 each, Rfl: 0    RESTASIS 0 05 % ophthalmic emulsion, Administer to both eyes every 12 (twelve) hours  , Disp: , Rfl: 0    sertraline (ZOLOFT) 100 mg tablet, take 1 tablet by mouth once daily, Disp: 90 tablet, Rfl: 1    verapamil (VERELAN PM) 120 MG 24 hr capsule, Take 1 capsule (120 mg total) by mouth daily, Disp: 90 capsule, Rfl: 1         Patient Active Problem List   Diagnosis    Radiculopathy of lumbar region    Tarsal tunnel syndrome, right    Psoriasis    PMB (postmenopausal bleeding)    Overactive bladder    Lichen sclerosus of female genitalia    Irritable bowel syndrome    Impaired fasting glucose    Allergic rhinitis due to pollen    Generalized anxiety disorder    Benign essential hypertension    Meralgia paresthetica of left side    Intervertebral disc disorder with radiculopathy of lumbosacral region    Stage 3a chronic kidney disease    GERD (gastroesophageal reflux disease)             Patient ID: Екатерина Harrison is a 62 y o  female      HPI     The following portions of the patient's history were reviewed and updated as appropriate:      family history includes Anxiety disorder in her mother; Arthritis in her father and mother; Atrial fibrillation in her father; Breast cancer (age of onset: 79) in her maternal grandmother; Diabetes in her mother; Heart disease in her mother; No Known Problems in her brother and maternal aunt; Obesity in her sister        reports that she has never smoked  She has never used smokeless tobacco  She reports current alcohol use  She reports that she does not use drugs         Objective:  Patient's shoes and socks removed     Foot ExamPhysical Exam       Foot Exam     General  General Appearance: appears stated age and healthy   Orientation: alert and oriented to person, place, and time   Affect: appropriate   Gait: antalgic         Right Foot/Ankle      Inspection and Palpation  Ecchymosis: none  Tenderness: bony tenderness   Swelling: dorsum   Arch: pes planus  Hammertoes: fifth toe  Hallux valgus: no  Hallux limitus: yes     Neurovascular  Dorsalis pedis: 3+  Posterior tibial: 3+  Saphenous nerve sensation: normal  Tibial nerve sensation: normal  Superficial peroneal nerve sensation: normal  Deep peroneal nerve sensation: normal  Sural nerve sensation: normal        Left Foot/Ankle       Inspection and Palpation  Ecchymosis: none  Swelling: dorsum   Arch: pes planus  Hammertoes: fifth toe  Hallux valgus: no  Hallux limitus: yes     Neurovascular  Dorsalis pedis: 3+  Posterior tibial: 3+  Saphenous nerve sensation: normal  Tibial nerve sensation: normal  Superficial peroneal nerve sensation: normal  Deep peroneal nerve sensation: normal  Sural nerve sensation: normal           Physical Exam  Vitals and nursing note reviewed  Constitutional:       Appearance: Normal appearance  Cardiovascular:      Rate and Rhythm: Normal rate and regular rhythm       Pulses:           Dorsalis pedis pulses are 3+ on the right side and 3+ on the left side         Posterior tibial pulses are 3+ on the right side and 3+ on the left side  Musculoskeletal:      Right foot: Bony tenderness present  No bunion  Pain with palpation plantar fascia insertion right foot     Left foot: No bunion       Comments:   Patient is pronated in stance and gait   She has pain with palpation left posterior tibial tendon as well as tibialis anterior tendon insertions of the left foot   X-ray demonstrates os tibialis externum   No evidence of fracture or bony mass    Skin:     Capillary Refill: Capillary refill takes less than 2 seconds  Neurological:      Mental Status: She is alert  Psychiatric:         Mood and Affect: Mood normal          BehaviorSuzette Murdock         Thought Content:  Thought content normal          Judgment: Judgment normal

## 2022-07-19 ENCOUNTER — OFFICE VISIT (OUTPATIENT)
Dept: FAMILY MEDICINE CLINIC | Facility: CLINIC | Age: 57
End: 2022-07-19
Payer: COMMERCIAL

## 2022-07-19 VITALS
SYSTOLIC BLOOD PRESSURE: 120 MMHG | HEIGHT: 66 IN | WEIGHT: 198 LBS | BODY MASS INDEX: 31.82 KG/M2 | TEMPERATURE: 99.1 F | DIASTOLIC BLOOD PRESSURE: 72 MMHG | OXYGEN SATURATION: 97 % | HEART RATE: 81 BPM | RESPIRATION RATE: 18 BRPM

## 2022-07-19 DIAGNOSIS — U07.1 COVID-19: Primary | ICD-10-CM

## 2022-07-19 DIAGNOSIS — F41.1 GENERALIZED ANXIETY DISORDER: ICD-10-CM

## 2022-07-19 DIAGNOSIS — I10 BENIGN ESSENTIAL HYPERTENSION: ICD-10-CM

## 2022-07-19 DIAGNOSIS — N18.31 STAGE 3A CHRONIC KIDNEY DISEASE (HCC): ICD-10-CM

## 2022-07-19 PROCEDURE — 3078F DIAST BP <80 MM HG: CPT | Performed by: NURSE PRACTITIONER

## 2022-07-19 PROCEDURE — 3074F SYST BP LT 130 MM HG: CPT | Performed by: NURSE PRACTITIONER

## 2022-07-19 PROCEDURE — 99214 OFFICE O/P EST MOD 30 MIN: CPT | Performed by: NURSE PRACTITIONER

## 2022-07-19 NOTE — PATIENT INSTRUCTIONS
COVID-19 (Coronavirus Disease 2019)   AMBULATORY CARE:   What you need to know about COVID-19:  COVID-19 is the disease caused by a coronavirus first discovered in December 2019  Coronaviruses generally cause upper respiratory (nose, throat, and lung) infections, such as a cold  The 2019 virus spreads quickly and easily  It can be spread starting 2 to 3 days before symptoms even begin  What you need to know about variants: The virus has changed into several new forms (called variants) since it was discovered  The variants may be more contagious (easily spread) than the original form  Some may also cause more severe illness than others  Signs and symptoms of COVID-19  may not develop  Signs and symptoms usually start about 5 days after infection but can take 2 to 14 days  You may feel like you have the flu or a bad cold  Some signs and symptoms go away in a few days  Others can last weeks, months, or possibly years  You may have any of the following:  A cough    Shortness of breath or trouble breathing that may become severe    A fever    Chills that might include shaking    Muscle pain, body aches, or a headache    A sore throat    Sudden changes or loss of your taste or smell    Feeling mentally and physically tired (fatigue)    Congestion (stuffy head and nose), or a runny nose    Diarrhea, nausea, or vomiting    Call your local emergency number (911 in the [address] [facility] [facility]) if:   You have trouble breathing or shortness of breath at rest     You have chest pain or pressure that lasts longer than 5 minutes  You become confused or hard to wake  Your lips or face are blue  Seek care immediately if:   You have a fever of 104°F (40°C) or higher  Call your doctor if:   You have symptoms of COVID-19  You have questions or concerns about your condition or care  How COVID-19 is diagnosed:  Testing is offered at many sites  You may need to quarantine until you get your results   Any of the following tests may be used:  A viral PCR test  shows if you have a current infection  A sample is taken from your nose or throat with a swab  You may need to wait 1 or more days to get the test results  An antigen test  shows if you have a protein from the COVID-19 virus  This test is often called a rapid test because the results can be available in 30 minutes or less  An antibody test  shows if you had a recent or past infection  Blood samples are used for this test  Antibodies are made by your immune system to fight the virus that causes COVID-19  Antibodies form 1 to 3 weeks after you are infected  This test is not used to show if you are immune to the virus  A CT, MRI, ultrasound, or x-ray  may be used to check for complications of LDMRC-52  These may include pneumonia, blood clots, or other complications  Treatment:   Mild symptoms  may get better on their own  Some treatments have emergency use authorization (EUA)  Examples include monoclonal antibodies and convalescent plasma  These may be given to help prevent worsening of your symptoms  You may also need any of the following:    Decongestants  help reduce nasal congestion and help you breathe more easily  If you take decongestant pills, they may make you feel restless or cause problems with your sleep  Do not use decongestant sprays for more than a few days  Cough suppressants  help reduce coughing  Ask your healthcare provider which type of cough medicine is best for you  To soothe a sore throat,  gargle with warm salt water, or use throat lozenges or a throat spray  Drink more liquids to thin and loosen mucus and to prevent dehydration  NSAIDs or acetaminophen  can help lower a fever and relieve body aches or a headache  Follow directions  If not taken correctly, NSAIDs can cause kidney damage and acetaminophen can cause liver damage      Severe or life-threatening symptoms  are treated in the hospital  You may need any of the following:     Medicines  may be given to fight the virus or treat inflammation  Blood thinners  help prevent or treat blood clots  If you have a deep vein thrombosis (DVT) or pulmonary embolism (PE), you may need to use blood thinners for at least 3 months  Extra oxygen  may be given if you have respiratory failure  This means your lungs cannot get enough oxygen into your blood and out to your organs  A ventilator  may be used to help you breathe  What you need to know about health problems the virus may cause: You may develop long-term health problems caused by the virus  Your risk is higher if you are 65 or older  A weak immune system, obesity, diabetes, chronic kidney disease, or a heart or lung condition can also increase your risk  Your risk is also higher if you are a current or former cigarette smoker  COVID-19 can lead to any of the following:  Multisymptom inflammatory syndrome in adults (MIS-A) or in children (MIS-C), causing inflammation in the heart, digestive system, skin, or brain    Shortness of breath, serious lower respiratory conditions, such as pneumonia or acute respiratory distress syndrome (ARDS)    Blood clots or blood vessel damage    Organ damage from a lack of oxygen or from blood clots    Sleep problems    Problems thinking clearly, remembering information, or concentrating    Mood changes, depression, or anxiety    Long-term problems tasting or smelling    Loss of appetite and weight loss    Nerve pain    Fatigue (feeling mentally and physically tired)    What you need to know about COVID-19 vaccines:  Healthcare providers recommend a COVID-19 vaccine, even if you have already had COVID-19  You are considered fully vaccinated against COVID-19 two weeks after the final dose of any COVID-19 vaccine  Let your healthcare provider know when you have received the final dose of the vaccine  Make a copy of your vaccination card  Keep the original with you in case you need to show it   Keep the copy in a safe place   COVID-19 vaccines are given as a shot in 1 or 2 doses  Vaccination is recommended for everyone 5 years or older  One 2-dose vaccine is fully approved  for those 16 years or older  This vaccine also has an emergency use authorization (EUA) for children 11to 13years old  No vaccine is currently available for children younger than 5 years  A booster (additional) dose  is given to help the immune system continue to protect against severe COVID-19  A booster is recommended for all adults 18 or older  The booster can be a different brand of the COVID-19 vaccine than you originally received  The timing for the booster depends on the type of vaccine you received:    1-dose vaccine: The booster is given at least 2 months after you received the vaccine  2-dose vaccine: The booster is given at least 5 or 6 months after the second dose  A booster can be given to adolescents 15to 16years old  Only 1 COVID-19 vaccine has this EUA  The booster is given at least 5 months after the second dose of the original vaccine series  A booster is recommended for immunocompromised children 11to 6years old  Only 1 COVID-19 vaccine has this EUA  The booster is given 28 days after the second dose  Continue social distancing and other measures, even after you get the vaccine  Although it is not common, you can become infected after you get the vaccine  You may also be able to pass the virus to others without knowing you are infected  After you get the vaccine, check local, national, and international travel rules  You may need to be tested before you travel  Some countries require proof of a negative test before you travel  You may also need to quarantine after you return  Medicine may be given to prevent infection  The medicine can be given if you are at high risk for infection and cannot get the vaccine  It can also be given if your immune system does not respond well to the vaccine      How the 2019 coronavirus spreads:   Droplets are the main way all coronaviruses spread  The virus travels in droplets that form when a person talks, sings, coughs, or sneezes  The droplets can also float in the air for minutes or hours  Infection happens when you breathe in the droplets or get them in your eyes or nose  Close personal contact with an infected person increases your risk for infection  This means being within 6 feet (2 meters) of the person for at least 15 minutes over 24 hours  Person-to-person contact can spread the virus  For example, a person with the virus on his or her hands can spread it by shaking hands with someone  The virus can stay on objects and surfaces for up to 3 days  You may become infected by touching the object or surface and then touching your eyes or mouth  Help lower the risk for COVID-19:   Wash your hands often throughout the day  Use soap and water  Rub your soapy hands together, lacing your fingers, for at least 20 seconds  Rinse with warm, running water  Dry your hands with a clean towel or paper towel  Use hand  that contains alcohol if soap and water are not available  Teach children how to wash their hands and use hand   Cover sneezes and coughs  Turn your face away and cover your mouth and nose with a tissue  Throw the tissue away  Use the bend of your arm if a tissue is not available  Then wash your hands well with soap and water or use hand   Teach children how to cover a cough or sneeze  Wear a face covering (mask) when needed  Use a cloth covering with at least 2 layers  You can also create layers by putting a cloth covering over a disposable non-medical mask  Cover your mouth and your nose  Follow worldwide, national, and local social distancing guidelines  Keep at least 6 feet (2 meters) between you and others  Try not to touch your face    If you get the virus on your hands, you can transfer it to your eyes, nose, or mouth and become infected  You can also transfer it to objects, surfaces, or people  Clean and disinfect high-touch surfaces and objects often  Use disinfecting wipes, or make a solution of 4 teaspoons of bleach in 1 quart (4 cups) of water  Ask about other vaccines you may need  Get the influenza (flu) vaccine as soon as recommended each year, usually starting in September or October  Get the pneumonia vaccine if recommended  Your healthcare provider can tell you if you should also get other vaccines, and when to get them  Follow social distancing guidelines:  National and local social distancing rules vary  Rules and restrictions may change over time as restrictions are lifted  The following are general guidelines:  Stay home if you are sick or think you may have COVID-19  It is important to stay home if you are waiting for a testing appointment or for test results  Avoid close physical contact with anyone who does not live in your home  Do not shake hands with, hug, or kiss a person as a greeting  If you must use public transportation (such as a bus or subway), try to sit or stand away from others  Wear your face covering  Avoid in-person gatherings and crowds  Attend virtually if possible  Follow up with your doctor as directed:  Write down your questions so you remember to ask them during your visits  For more information:   [facility]  [address] , [address]  Phone: 0- 491 - 842-8062  Web Address: [employer] com     © Copyright [address] 2022 Information is for End User's use only and may not be sold, redistributed or otherwise used for commercial purposes  All illustrations and images included in CareNotes® are the copyrighted property of [employer]  or [facility] Alexis Sanders   The above information is an  only  It is not intended as medical advice for individual conditions or treatments   Talk to your doctor, nurse or pharmacist before following any medical regimen to see if it is safe and effective for you

## 2022-07-19 NOTE — PROGRESS NOTES
Assessment/Plan:    1  COVID-19  Comments:  patient aware that if changes her mind about paxlovid, can call within 5 days of symptom onset and will order then    2  Benign essential hypertension  Comments:  stable with current regimen and advised to continue medication    3  Stage 3a chronic kidney disease  Comments:  last creatinine was normal and GFR was 67    4  Generalized anxiety disorder  Comments:  complaint with regimen    Vitamin D, vitamin C and Zinc supplementation discussed with patient  Discussed about paxlovid in detail with current guidelines and possible adverse effects and as patient does not have any comorbodities and not immunocompromised then decided not to take paxlovid and will continue with supportive care  Advised that has to quarantine for 5 days from symptom onset and as long as fever free for 24 hours without any medication after 5 days then can go to work and be around people just has to wear mask for total 10 days from day of symptom onset  Patient Instructions:  Increase fluid intake, saline nasal rinses, and hot tea with honey and lemon  Cool air humidification can be helpful as well  May take Tylenol as needed for pain or fevers  Mucinex D for sinus congestion or Coricidin HBP if you have high blood pressure or a heart condition  Mucinex or Robitussin DM are effective for cough and chest congestion  Supportive care discussed and advised  Advised to RTO for any worsening and no improvement  Follow up for no improvement and worsening of conditions  Patient advised and educated when to see immediate medical care  Return if symptoms worsen or fail to improve  Future Appointments   Date Time Provider Vickie Swift   8/19/2022 10:00 AM Sukumar Alarcon   8/19/2022 10:30 AM [facility]   9/23/2022  4:30 PM Theodora Jain DO [facility]           Subjective:      Patient ID: Junior Mccullough is a 62 y o  female  Chief Complaint   Patient presents with    COVID-19     Today Positive , SX yesterday                    Nisha Noel           Vitals:  /72   Pulse 81   Temp 99 1 °F (37 3 °C)   Resp 18   Ht 5' 6" (1 676 m)   Wt 89 8 kg (198 lb)   SpO2 97%   BMI 31 96 kg/m²     HPI  Patient started with tickle in throat yesterday on 7/18/2022 and today progressed to congestion today and did home test today for covid-19 which is positive  Was exposed to covid-19 positive person on 7/15/2022  Denies fever, chills and sob  Stated that not feeling bad at all          The following portions of the patient's history were reviewed and updated as appropriate: allergies, current medications, past family history, past medical history, past social history, past surgical history and problem list       Review of Systems   Constitutional: Negative for chills, diaphoresis, fatigue, fever and unexpected weight change  HENT: Positive for congestion and sore throat  Negative for dental problem, drooling, ear discharge, ear pain, facial swelling, hearing loss, mouth sores, nosebleeds, postnasal drip, rhinorrhea, sinus pressure, sinus pain, sneezing, tinnitus, trouble swallowing and voice change  As noted in HPI     Respiratory: Negative for cough, chest tightness, shortness of breath and wheezing  Cardiovascular: Negative  Gastrointestinal: Negative for abdominal pain, constipation, diarrhea, nausea and vomiting  Musculoskeletal: Negative  Skin: Negative  Neurological: Negative for dizziness, light-headedness and headaches  Objective:    Social History     Tobacco Use   Smoking Status Never Smoker   Smokeless Tobacco Never Used       Allergies: Allergies   Allergen Reactions    Bee Venom Anaphylaxis     Reaction Date: [id number];   Reaction Date: 23Oct2006;     Meperidine GI Intolerance     DEMEROL vomiting  DEMEROL vomiting    Penicillins      Reaction Date: 25Jul2005;  Annotation - [id number]: as infant - throat closes, difficult breathing         Current Outpatient Medications   Medication Sig Dispense Refill    cholecalciferol (VITAMIN D3) 1,000 units tablet Take 1 tablet (1,000 Units total) by mouth daily 30 tablet 0    clobetasol (TEMOVATE) 0 05 % cream Apply topically 2 (two) times a day as needed      clonazePAM (KlonoPIN) 0 5 mg tablet take 1 tablet by mouth three times a day if needed for anxiety 90 tablet 0    diclofenac sodium (VOLTAREN) 1 % Place on the skin      docusate sodium (COLACE) 100 mg capsule Take 1 capsule (100 mg total) by mouth 2 (two) times a day as needed for constipation 60 capsule 0    Omega-3 Fatty Acids (fish oil) 1,000 mg Take 1,000 mg by mouth daily      polyethylene glycol (MIRALAX) 17 g packet Take 17 g by mouth daily (Patient taking differently: Take 17 g by mouth if needed) 14 each 0    RESTASIS 0 05 % ophthalmic emulsion Administer to both eyes every 12 (twelve) hours    0    sertraline (ZOLOFT) 100 mg tablet take 1 tablet by mouth once daily 90 tablet 1    sulindac (CLINORIL) 200 MG tablet Take 1 tablet (200 mg total) by mouth 2 (two) times a day for 20 days 40 tablet 0    verapamil (VERELAN PM) 120 MG 24 hr capsule Take 1 capsule (120 mg total) by mouth daily 90 capsule 1    meloxicam (MOBIC) 7 5 mg tablet Take 1 tablet (7 5 mg total) by mouth daily for 10 days 10 tablet 0     No current facility-administered medications for this visit  Physical Exam  Constitutional:       Appearance: Normal appearance  HENT:      Head: Normocephalic and atraumatic  Nose: Nose normal    Eyes:      Conjunctiva/sclera: Conjunctivae normal    Cardiovascular:      Rate and Rhythm: Normal rate and regular rhythm  Pulses: Normal pulses  Heart sounds: Normal heart sounds  Pulmonary:      Effort: Pulmonary effort is normal       Breath sounds: Normal breath sounds  Skin:     General: Skin is warm and dry  Findings: No rash  Neurological:      Mental Status: She is alert and oriented to person, place, and time  Psychiatric:         Mood and Affect: Mood normal          Behavior: Behavior normal          Thought Content:  Thought content normal          Judgment: Judgment normal                      ASA Arguelles

## 2022-08-15 ENCOUNTER — TELEPHONE (OUTPATIENT)
Dept: FAMILY MEDICINE CLINIC | Facility: CLINIC | Age: 57
End: 2022-08-15

## 2022-08-15 DIAGNOSIS — F41.1 GENERALIZED ANXIETY DISORDER: ICD-10-CM

## 2022-08-15 DIAGNOSIS — I10 BENIGN ESSENTIAL HYPERTENSION: ICD-10-CM

## 2022-08-15 RX ORDER — VERAPAMIL HYDROCHLORIDE 120 MG/1
120 CAPSULE, EXTENDED RELEASE ORAL DAILY
Qty: 90 CAPSULE | Refills: 1 | Status: SHIPPED | OUTPATIENT
Start: 2022-08-15 | End: 2022-08-15 | Stop reason: SDUPTHER

## 2022-08-15 RX ORDER — SERTRALINE HYDROCHLORIDE 100 MG/1
100 TABLET, FILM COATED ORAL DAILY
Qty: 90 TABLET | Refills: 1 | Status: SHIPPED | OUTPATIENT
Start: 2022-08-15 | End: 2022-08-15 | Stop reason: SDUPTHER

## 2022-08-15 RX ORDER — SERTRALINE HYDROCHLORIDE 100 MG/1
100 TABLET, FILM COATED ORAL DAILY
Qty: 90 TABLET | Refills: 1 | Status: SHIPPED | OUTPATIENT
Start: 2022-08-15

## 2022-08-15 RX ORDER — VERAPAMIL HYDROCHLORIDE 120 MG/1
120 CAPSULE, EXTENDED RELEASE ORAL DAILY
Qty: 90 CAPSULE | Refills: 1 | Status: SHIPPED | OUTPATIENT
Start: 2022-08-15

## 2022-08-15 NOTE — TELEPHONE ENCOUNTER
Pt states she needs two refills to be resent to [facility], not [facility]   Her insurance told her they will not cover [facility]   Pls resend and let pt know  She states its been 3 days since she has taken

## 2022-08-19 ENCOUNTER — HOSPITAL ENCOUNTER (OUTPATIENT)
Dept: RADIOLOGY | Facility: HOSPITAL | Age: 57
Discharge: HOME | End: 2022-08-19
Payer: COMMERCIAL

## 2022-08-19 VITALS — HEIGHT: 66 IN | WEIGHT: 198 LBS | BODY MASS INDEX: 31.82 KG/M2

## 2022-08-19 DIAGNOSIS — R92.8 ABNORMAL MAMMOGRAM: ICD-10-CM

## 2022-08-19 PROCEDURE — 76642 ULTRASOUND BREAST LIMITED: CPT

## 2022-08-19 PROCEDURE — G0279 TOMOSYNTHESIS, MAMMO: HCPCS

## 2022-08-19 PROCEDURE — 77066 DX MAMMO INCL CAD BI: CPT

## 2022-09-13 ENCOUNTER — OFFICE VISIT (OUTPATIENT)
Dept: PODIATRY | Facility: CLINIC | Age: 57
End: 2022-09-13
Payer: COMMERCIAL

## 2022-09-13 VITALS
SYSTOLIC BLOOD PRESSURE: 120 MMHG | WEIGHT: 198 LBS | DIASTOLIC BLOOD PRESSURE: 72 MMHG | RESPIRATION RATE: 17 BRPM | HEIGHT: 66 IN | BODY MASS INDEX: 31.82 KG/M2

## 2022-09-13 DIAGNOSIS — M76.61 ACHILLES TENDINITIS OF RIGHT LOWER EXTREMITY: Primary | ICD-10-CM

## 2022-09-13 DIAGNOSIS — M79.671 RIGHT FOOT PAIN: ICD-10-CM

## 2022-09-13 PROCEDURE — 20551 NJX 1 TENDON ORIGIN/INSJ: CPT | Performed by: PODIATRIST

## 2022-09-13 PROCEDURE — 99212 OFFICE O/P EST SF 10 MIN: CPT | Performed by: PODIATRIST

## 2022-09-13 RX ORDER — SULINDAC 200 MG/1
200 TABLET ORAL 2 TIMES DAILY
Qty: 40 TABLET | Refills: 0 | Status: SHIPPED | OUTPATIENT
Start: 2022-09-13 | End: 2022-10-04 | Stop reason: SDUPTHER

## 2022-09-13 NOTE — PROGRESS NOTES
Assessment/Plan:  Achilles tendinitis right lower extremity  Retrocalcaneal spur right lower extremity  Resolved plantar fasciitis  Acquired deformity foot  Acquired pes planus  Plan  Foot exam performed  Today trigger point injection done  Into the area the right Achilles tendon insertion, 1 cc dexamethasone phosphate injected without pain or complication  Patient will use Voltaren Gel  She has been placed on sulindac  She will stretch daily  Return for follow-up  Lab work ordered to rule out inflammatory arthropathy  Diagnoses and all orders for this visit:    Achilles tendinitis of right lower extremity  -     sulindac (CLINORIL) 200 MG tablet; Take 1 tablet (200 mg total) by mouth 2 (two) times a day for 20 days  -     Ambulatory referral to Physical Therapy; Future    Right foot pain  -     sulindac (CLINORIL) 200 MG tablet; Take 1 tablet (200 mg total) by mouth 2 (two) times a day for 20 days  -     Ambulatory referral to Physical Therapy; Future          Subjective:  Patient has a new problem  She has pain in the back of the heel  She does not suffer from plantar fasciitis any more  She has pain in the back of the heel  No history of trauma    Allergies   Allergen Reactions    Bee Venom Anaphylaxis     Reaction Date: [id number];   Reaction Date: 23Oct2006;     Meperidine GI Intolerance     DEMEROL vomiting  DEMEROL vomiting    Penicillins      Reaction Date: 25Jul2005;  Annotation - [id number]: as infant - throat closes, difficult breathing         Current Outpatient Medications:     sulindac (CLINORIL) 200 MG tablet, Take 1 tablet (200 mg total) by mouth 2 (two) times a day for 20 days, Disp: 40 tablet, Rfl: 0    cholecalciferol (VITAMIN D3) 1,000 units tablet, Take 1 tablet (1,000 Units total) by mouth daily, Disp: 30 tablet, Rfl: 0    clobetasol (TEMOVATE) 0 05 % cream, Apply topically 2 (two) times a day as needed, Disp: , Rfl:     clonazePAM (KlonoPIN) 0 5 mg tablet, take 1 tablet by mouth three times a day if needed for anxiety, Disp: 90 tablet, Rfl: 0    diclofenac sodium (VOLTAREN) 1 %, Place on the skin, Disp: , Rfl:     docusate sodium (COLACE) 100 mg capsule, Take 1 capsule (100 mg total) by mouth 2 (two) times a day as needed for constipation, Disp: 60 capsule, Rfl: 0    meloxicam (MOBIC) 7 5 mg tablet, Take 1 tablet (7 5 mg total) by mouth daily for 10 days, Disp: 10 tablet, Rfl: 0    Omega-3 Fatty Acids (fish oil) 1,000 mg, Take 1,000 mg by mouth daily, Disp: , Rfl:     polyethylene glycol (MIRALAX) 17 g packet, Take 17 g by mouth daily (Patient taking differently: Take 17 g by mouth if needed), Disp: 14 each, Rfl: 0    RESTASIS 0 05 % ophthalmic emulsion, Administer to both eyes every 12 (twelve) hours  , Disp: , Rfl: 0    sertraline (ZOLOFT) 100 mg tablet, Take 1 tablet (100 mg total) by mouth daily, Disp: 90 tablet, Rfl: 1    sulindac (CLINORIL) 200 MG tablet, Take 1 tablet (200 mg total) by mouth 2 (two) times a day for 20 days, Disp: 40 tablet, Rfl: 0    verapamil (VERELAN PM) 120 MG 24 hr capsule, Take 1 capsule (120 mg total) by mouth daily, Disp: 90 capsule, Rfl: 1    Patient Active Problem List   Diagnosis    Radiculopathy of lumbar region    Tarsal tunnel syndrome, right    Psoriasis    PMB (postmenopausal bleeding)    Overactive bladder    Lichen sclerosus of female genitalia    Irritable bowel syndrome    Impaired fasting glucose    Allergic rhinitis due to pollen    Generalized anxiety disorder    Benign essential hypertension    Meralgia paresthetica of left side    Intervertebral disc disorder with radiculopathy of lumbosacral region    Stage 3a chronic kidney disease    GERD (gastroesophageal reflux disease)          Patient ID: Marilyn Byrd is a 62 y o  female      HPI    The following portions of the patient's history were reviewed and updated as appropriate:     family history includes Anxiety disorder in her mother; Arthritis in her father and mother; Atrial fibrillation in her father; Breast cancer (age of onset: 79) in her maternal grandmother; Diabetes in her mother; Heart disease in her mother; No Known Problems in her brother and maternal aunt; Obesity in her sister  reports that she has never smoked  She has never used smokeless tobacco  She reports current alcohol use of about 2 0 standard drinks of alcohol per week  She reports that she does not use drugs  Vitals:    09/13/22 1634   BP: 120/72   Resp: 17       Review of Systems      Objective:  Patient's shoes and socks removed  Foot ExamPhysical Exam         Foot Exam     General  General Appearance: appears stated age and healthy   Orientation: alert and oriented to person, place, and time   Affect: appropriate   Gait: antalgic         Right Foot/Ankle      Inspection and Palpation  Ecchymosis: none  Tenderness: bony tenderness   Swelling: dorsum   Arch: pes planus  Hammertoes: fifth toe  Hallux valgus: no  Hallux limitus: yes     Neurovascular  Dorsalis pedis: 3+  Posterior tibial: 3+  Saphenous nerve sensation: normal  Tibial nerve sensation: normal  Superficial peroneal nerve sensation: normal  Deep peroneal nerve sensation: normal  Sural nerve sensation: normal        Left Foot/Ankle       Inspection and Palpation  Ecchymosis: none  Swelling: dorsum   Arch: pes planus  Hammertoes: fifth toe  Hallux valgus: no  Hallux limitus: yes     Neurovascular  Dorsalis pedis: 3+  Posterior tibial: 3+  Saphenous nerve sensation: normal  Tibial nerve sensation: normal  Superficial peroneal nerve sensation: normal  Deep peroneal nerve sensation: normal  Sural nerve sensation: normal           Physical Exam  Vitals and nursing note reviewed  Constitutional:       Appearance: Normal appearance     Cardiovascular:      Rate and Rhythm: Normal rate and regular rhythm       Pulses:           Dorsalis pedis pulses are 3+ on the right side and 3+ on the left side         Posterior tibial pulses are 3+ on the right side and 3+ on the left side  Musculoskeletal:      Right foot: Bony tenderness present  No bunion   Pain with palpation plantar fascia insertion right foot     Left foot: No bunion       Comments:   Patient is pronated in stance and gait   She has pain with palpation right Achilles tendon insertion  Retrocalcaneal spur palpated

## 2022-09-20 PROBLEM — N95.0 PMB (POSTMENOPAUSAL BLEEDING): Status: RESOLVED | Noted: 2018-04-25 | Resolved: 2022-09-20

## 2022-09-21 ENCOUNTER — EVALUATION (OUTPATIENT)
Dept: PHYSICAL THERAPY | Facility: CLINIC | Age: 57
End: 2022-09-21
Payer: COMMERCIAL

## 2022-09-21 DIAGNOSIS — M76.61 ACHILLES TENDINITIS OF RIGHT LOWER EXTREMITY: Primary | ICD-10-CM

## 2022-09-21 DIAGNOSIS — M79.671 RIGHT FOOT PAIN: ICD-10-CM

## 2022-09-21 NOTE — PROGRESS NOTES
PT Evaluation     Today's date: 2022  Patient name: Lewis Oropeza  : 1965  MRN: [id number]  Referring provider: Vel Raymond DPM  Dx:   Encounter Diagnosis     ICD-10-CM    1  Achilles tendinitis of right lower extremity  M76 [id number] Ambulatory referral to Physical Therapy   2  Right foot pain  [id number] Ambulatory referral to Physical Therapy                  Assessment  Assessment details:   CASE SUMMARY:   Lewis Oropeza is a 62y o  year old female who presents to [facility] upon referral from podiatrist  secondary to c/o right achilles tendonitis  [place] reports onset of symptoms ~  3 months ago, symptoms began as plantar fascitis improved with injection, by end of July pain was occurring further into heel area near achilles tendon insertion and recently had an injection in area closer to achilles, which helped but pain increases when she is physically active  Patient reports she gets the PF consistently every summer after first trip to beach: usually resolves with injection  Uses vionic or berkenstocks for footwear, does use orthotics  States she has no success with orthotics  Current symptom location includes posterior calcaneous in achilles tendon insertion  and patient describes  current symptoms as: achy and sharp  End of day achy  Symptoms are :constant  Pain level:  Current: 4/10  At Best: 4/10    with ADL's 5-6/10  Symptoms improve with: rest, ice pack, voltaren gel, and worsen with excessive actvitiy and working out  Overall symptom progression at this time is unchanging  Previous injury to this area: + plantar fascitis  Previous treatment includes: injections, is stretching calf musculature   Diagnostic testing includes: none  PMHx includes: See chart for full details with medications, allergies, past family history, past medical history, social history, past surgical history and problem list  All topics were reviewed        Functionally, at baseline, [place] is independent with all basic ADL's and  advanced ADL's  Currently, [place] is limited in the following activities: unable to work out and walk for exercise, pain with ADl's  Weightbearing actvities are limited  [place] is lives with  in a 2 story home  Recreational activities include: gym with  3 x week, walks 2 miles 2-3 x week   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  Patient goal(s) for physical therapy is: to return to activities prior to pain  The following is a summary of [place] objective status:    Impairments:   Postural dysfunction  Reduced  And painful ROM  Reduced and painful strength  + TTP and increased soft tissue density: soleus  Reduced flexibility: LE musculature  Gait/elevation dysfunction  Reduced stability  Reduced activity tolerance including above stated functional limitations    Patient's clinical presentation is consistent with their referring diagnosis of: Achilles tendinitis of right lower extremity  (primary encounter diagnosis)  Plan: Ambulatory referral to Physical Therapy    Right foot pain  Plan: Ambulatory referral to Physical Therapy    Patient presents with pain in area of right posterior calcaneous in area of achilles tendon insertion  Patient presents with redced joint mobility, reduced flexibility calf and soleus, increased tension and tenderness in right soleus, gait dysfunction and reduced stability  Patient will benefit form skilled PT to maximize function and resolve objective findings  PLOC was discussed and agreed upon with patient  Patient was educated on: [facility] and proper tape removal to avoid skin irritation, as well as soleus stretch   Patient vocalized a good understanding of  PLOC and HEP issued   [place] would benefit from skilled physical therapy services to address their aforementioned functional limitations and progress towards prior level of function, to meet stated goals,  and independence with home exercise program   Prognosis for successful rehab outcome is good with consistent participation in therapy and carryover of HEP and PLOC  FOTO score is 52% with a 67% prediction in function  Functional limitations: per patient subjectiveUnderstanding of Dx/Px/POC: good   Prognosis: good    Goals  STG:  + Patient will report a 40% improvement in symptoms (2-3 weeks)   + Patient will be independent in basic HEP (2-3 weeks)  + Patient will demonstrate reduced gait deviations ambulating on level surfaces   (2-3 week)  + Patient will demonstrate an increase in flexibility right calf musculautre by one grade (2-3 weeks)  + Patient will increase SLS time by 10  seconds (2-3 weeks)  + Patient will report increased ease walking due to a reduction in pain (2-3 weeks)      LTG:  + Patient will have pain level 0/10 right ankle  with ADL's (4-6 weeks)  + Patient will report a 70% improvement in symptoms (4-6 weeks)   + Patient will increase FOTO score by 10 points  (10 sessions)   + Patient will be independent in comprehensive HEP (4-6 weeks)  + Patient will demonstrate no gait deviations ambulating on level surfaces   (4-6 week)  + Patient will negotiate stairs reciprically with use of one railing and painfree  (4-6 weeks)  + Patient will demonstrate increase  MMT of left hip to  4/5 for maximum function  (4-6 weeks)  + Patient will report no functional limitations  (4-6 weeks)    Plan  Plan details:  2-3 x week, 4-6 weeks: HEP development, stretching LE musculature per objective findings, strengthening LE musculature per objective findings, A/AA/PROM ankle motions, joint mobilizations prn, posture education especially foot posture prn, STM/MI as needed to reduce muscle tension as per objective findings, muscle reeducation prn, gait/balance training, stability training, Mobley/Ktape prn PLOC discussed and agreed upon with patient      Patient would benefit from: skilled physical therapy  Frequency: 2x week  Plan of Care beginning date: 9/21/2022  Plan of Care expiration date: 11/2/2022  Treatment plan discussed with: patient        Subjective    Objective     Static Posture     Comments    History of degenerative disc disease: experiences minimal back pain but no history of sciatica since pregnancy (34 years ago)    Posture:reduced arch support ad     Gait: no assistive device, reduced roll over and push off on right LE     Elevations: climbing: reciprocally, descending step to per patient report    Functional activities:  SLS: Right 30 sec + pain     Left: 13:69 sec needs cuing to facilitate core during SLS  Squat: wnl with no cuing + pain right achilles tendon    Palpation: no TTP in area of right PF, + TTP medial aspect of right soleus, and along achillies tendon distal aspect rigth     ROM:  Ankle Dorsiflexion: Right: wnl  + pain Left: wnl   Ankle Plantarflexion: Right: wnl + pain  Left: wnl   Ankle Inversion: Right: wnl + pain Left: wnl   Ankle Eversion: Right: wnl + pain Left: wnl  Calcaneal rocking: Right: wnl    Left: wnl   First MTP flexion: Right: wnl   Left: wnl   First MTP extension: Right: fair    Left: wnl     Joint mobility:   Talocrural: Right: fair   Left: fair   Metatarsal: Right: fair    Left: fair       MMT:  Ankle Dorsiflexion: Right: 4 /5 + pain left: 4/5   Ankle Plantarflexion: Right: 4 /5 + pain Left: 4/5   Ankle Inversion: Right: 4/5  + pain Left: 4/5   Ankle Eversion: Right: 4 /5 + pain Left: 4/5   able to walk on heels and toes + pain on right      Hip abd: left 3+/5  Right: 4/5   Clamshells: left 4-/5  Right: 4/5     Flexibility:  Gastroc/soleus: Right:  Fair + pain   Left fair no pain   Hamstrings: Right:  Good  Left good                Eval/ Re-eval Auth #/ Referral # Total visits Start date  Expiration date Total active units  Total manual units  PT only or  PT+OT?   9/21/22 NO AUTH 30 pcy                                                      Precautions:   Digestive  Irritable bowel syndrome    GERD (gastroesophageal reflux disease)       Endocrine  Impaired fasting glucose       Respiratory  Allergic rhinitis due to pollen       Cardiovascular and Mediastinum  Benign essential hypertension       Nervous and Auditory  Radiculopathy of lumbar region    Tarsal tunnel syndrome, right    Meralgia paresthetica of left side    Intervertebral disc disorder with radiculopathy of lumbosacral region       Musculoskeletal and Integument  Psoriasis       Genitourinary  Overactive bladder    Lichen sclerosus of female genitalia    Stage 3a chronic kidney disease       Other  Generalized anxiety disorder      Specialty Daily Treatment Diary     Insurance:         Visits: 1       Manual 9/21/22       PROM Ankle: all motions, hallux extension        STM/MI of soleus, gastroc complex prn         Joint mobs: metatarsals, TC joint        Ktape Navicular sling, Space correction directly over achilles tendon                       Exercise Diary         PROTOCOL:         Ther ex:         Rec bike/nustep        prostretch        Soleus stretch with half roll instruct       Ankle Tband 4 way        Ankle Df stretch on step                Bilateral heel raises with eccentric lowering on right        sidelying hip abd        Side lying clamshells        Standing hip abd heel against wall                                Neuro Gary:        glute set with bridge progression        Hip burners        SLS advancing to 3 way hip        Rockerboard:  DF/PF  INV/EV                Ther Activity:        Reevaluation                Gait Training:                 HEP:                Modalities:        MH        Ice        Total time:

## 2022-09-21 NOTE — LETTER
2022    Chelo Goodman, [address] [facility] [address]    Patient: Jm Miller   YOB: 1965   Date of Visit: 2022     Encounter Diagnosis     ICD-10-CM    1  Achilles tendinitis of right lower extremity  M76 61 Ambulatory referral to Physical Therapy   2  Right foot pain  M79 671 Ambulatory referral to Physical Therapy       Dear Dr Julio Cesar Haynes: Thank you for your recent referral of Jm Miller  Please review the attached evaluation summary from Mike's recent visit  Please verify that you agree with the plan of care by signing the attached order  If you have any questions or concerns, please do not hesitate to call  I sincerely appreciate the opportunity to share in the care of one of your patients and hope to have another opportunity to work with you in the near future  Sincerely,    Ravi Heck, PT      Referring Provider:      I certify that I have read the below Plan of Care and certify the need for these services furnished under this plan of treatment while under my care  Chelo Goodman DPM  [address] [place] [id number]  Via Fax: [phone]          PT Evaluation     Today's date: 2022  Patient name: Jm Miller  : 1965  MRN: [id number]  Referring provider: Tab Barton DPM  Dx:   Encounter Diagnosis     ICD-10-CM    1  Achilles tendinitis of right lower extremity  M76 61 Ambulatory referral to Physical Therapy   2  Right foot pain  M79 671 Ambulatory referral to Physical Therapy                  Assessment  Assessment details:   CASE SUMMARY:   Jm Miller is a 62y o  year old female who presents to OPPT upon referral from podiatrist  secondary to c/o right achilles tendonitis    Roberto Asencio reports onset of symptoms ~  3 months ago, symptoms began as plantar fascitis improved with injection, by end of July pain was occurring further into heel area near achilles tendon insertion and recently had an injection in area closer to achilles, which helped but pain increases when she is physically active  Patient reports she gets the PF consistently every summer after first trip to beach: usually resolves with injection  Uses vionic or berkenstocks for footwear, does use orthotics  States she has no success with orthotics  Current symptom location includes posterior calcaneous in achilles tendon insertion  and patient describes  current symptoms as: achy and sharp  End of day achy  Symptoms are :constant  Pain level:  Current: 4/10  At Best: 4/10    with ADL's 5-6/10  Symptoms improve with: rest, ice pack, voltaren gel, and worsen with excessive actvitiy and working out  Overall symptom progression at this time is unchanging  Previous injury to this area: + plantar fascitis  Previous treatment includes: injections, is stretching calf musculature   Diagnostic testing includes: none  PMHx includes: See chart for full details with medications, allergies, past family history, past medical history, social history, past surgical history and problem list  All topics were reviewed  Functionally, at baseline, Dionisio Turk is independent with all basic ADL's and  advanced ADL's  Currently, Dionisio Turk is limited in the following activities: unable to work out and walk for exercise, pain with ADl's  Weightbearing actvities are limited  Dionisio Turk is lives with  in a 2 story home  Recreational activities include: gym with  3 x week, walks 2 miles 2-3 x week   [employer] is employed as a behavior health technician  Patient goal(s) for physical therapy is: to return to activities prior to pain         The following is a summary of Dionisio Turk objective status:    Impairments:   Postural dysfunction  Reduced  And painful ROM  Reduced and painful strength  + TTP and increased soft tissue density: soleus  Reduced flexibility: LE musculature  Gait/elevation dysfunction  Reduced stability  Reduced activity tolerance including above stated functional limitations    Patient's clinical presentation is consistent with their referring diagnosis of: Achilles tendinitis of right lower extremity  (primary encounter diagnosis)  Plan: Ambulatory referral to Physical Therapy    Right foot pain  Plan: Ambulatory referral to Physical Therapy    Patient presents with pain in area of right posterior calcaneous in area of achilles tendon insertion  Patient presents with redced joint mobility, reduced flexibility calf and soleus, increased tension and tenderness in right soleus, gait dysfunction and reduced stability  Patient will benefit form skilled PT to maximize function and resolve objective findings  PLOC was discussed and agreed upon with patient  Patient was educated on: [facility] and proper tape removal to avoid skin irritation, as well as soleus stretch   Patient vocalized a good understanding of  PLOC and HEP issued  Arnaldo Roy would benefit from skilled physical therapy services to address their aforementioned functional limitations and progress towards prior level of function, to meet stated goals,  and independence with home exercise program   Prognosis for successful rehab outcome is good with consistent participation in therapy and carryover of HEP and PLOC  FOTO score is 52% with a 67% prediction in function       Functional limitations: per patient subjectiveUnderstanding of Dx/Px/POC: good   Prognosis: good    Goals  STG:  + Patient will report a 40% improvement in symptoms (2-3 weeks)   + Patient will be independent in basic HEP (2-3 weeks)  + Patient will demonstrate reduced gait deviations ambulating on level surfaces   (2-3 week)  + Patient will demonstrate an increase in flexibility right calf musculautre by one grade (2-3 weeks)  + Patient will increase SLS time by 10  seconds (2-3 weeks)  + Patient will report increased ease walking due to a reduction in pain (2-3 weeks)      LTG:  + Patient will have pain level 0/10 right ankle  with ADL's (4-6 weeks)  + Patient will report a 70% improvement in symptoms (4-6 weeks)   + Patient will increase FOTO score by 10 points  (10 sessions)   + Patient will be independent in comprehensive HEP (4-6 weeks)  + Patient will demonstrate no gait deviations ambulating on level surfaces   (4-6 week)  + Patient will negotiate stairs reciprically with use of one railing and painfree  (4-6 weeks)  + Patient will demonstrate increase  MMT of left hip to  4/5 for maximum function  (4-6 weeks)  + Patient will report no functional limitations  (4-6 weeks)    Plan  Plan details:  2-3 x week, 4-6 weeks: HEP development, stretching LE musculature per objective findings, strengthening LE musculature per objective findings, A/AA/PROM ankle motions, joint mobilizations prn, posture education especially foot posture prn, STM/MI as needed to reduce muscle tension as per objective findings, muscle reeducation prn, gait/balance training, stability training, Mobley/Erik prn PLOC discussed and agreed upon with patient  Patient would benefit from: skilled physical therapy  Frequency: 2x week  Plan of Care beginning date: 9/21/2022  Plan of Care expiration date: 11/2/2022  Treatment plan discussed with: patient        Subjective    Objective     Static Posture     Comments    History of degenerative disc disease: experiences minimal back pain but no history of sciatica since pregnancy (34 years ago)    Posture:reduced arch support ad     Gait: no assistive device, reduced roll over and push off on right LE     Elevations: climbing: reciprocally, descending step to per patient report    Functional activities:  SLS: Right 30 sec + pain     Left: 13:69 sec needs cuing to facilitate core during SLS  Squat: wnl with no cuing + pain right achilles tendon    Palpation: no TTP in area of right PF, + TTP medial aspect of right soleus, and along achillies tendon distal aspect rigth     ROM:  Ankle Dorsiflexion: Right: wnl  + pain Left: wnl   Ankle Plantarflexion: Right: wnl + pain  Left: wnl   Ankle Inversion: Right: wnl + pain Left: wnl   Ankle Eversion: Right: wnl + pain Left: wnl  Calcaneal rocking: Right: wnl    Left: wnl   First MTP flexion: Right: wnl   Left: wnl   First MTP extension: Right: fair    Left: wnl     Joint mobility:   Talocrural: Right: fair   Left: fair   Metatarsal: Right: fair    Left: fair       MMT:  Ankle Dorsiflexion: Right: 4 /5 + pain left: 4/5   Ankle Plantarflexion: Right: 4 /5 + pain Left: 4/5   Ankle Inversion: Right: 4/5  + pain Left: 4/5   Ankle Eversion: Right: 4 /5 + pain Left: 4/5   able to walk on heels and toes + pain on right      Hip abd: left 3+/5  Right: 4/5   Clamshells: left 4-/5  Right: 4/5     Flexibility:  Gastroc/soleus: Right:  Fair + pain   Left fair no pain   Hamstrings: Right:  Good  Left good                Eval/ Re-eval Auth #/ Referral # Total visits Start date  Expiration date Total active units  Total manual units  PT only or  PT+OT?   9/21/22 NO AUTH 30 pcy                                                      Precautions:   Digestive  Irritable bowel syndrome    GERD (gastroesophageal reflux disease)       Endocrine  Impaired fasting glucose       Respiratory  Allergic rhinitis due to pollen       Cardiovascular and Mediastinum  Benign essential hypertension       Nervous and Auditory  Radiculopathy of lumbar region    Tarsal tunnel syndrome, right    Meralgia paresthetica of left side    Intervertebral disc disorder with radiculopathy of lumbosacral region       Musculoskeletal and Integument  Psoriasis       Genitourinary  Overactive bladder    Lichen sclerosus of female genitalia    Stage 3a chronic kidney disease       Other  Generalized anxiety disorder      Specialty Daily Treatment Diary     Insurance:         Visits: 1       Manual 9/21/22       PROM Ankle: all motions, hallux extension        STM/MI of soleus, gastroc complex prn         Joint mobs: metatarsals, TC joint        Ktape Navicular sling, Space correction directly over achilles tendon                       Exercise Diary         PROTOCOL:         Ther ex:         Rec bike/nustep        prostretch        Soleus stretch with half roll instruct       Ankle Tband 4 way        Ankle Df stretch on step                Bilateral heel raises with eccentric lowering on right        sidelying hip abd        Side lying clamshells        Standing hip abd heel against wall                                Neuro Gary:        glute set with bridge progression        Hip burners        SLS advancing to 3 way hip        Rockerboard:  DF/PF  INV/EV                Ther Activity:        Reevaluation                Gait Training:                 HEP:                Modalities:        MH        Ice        Total time:

## 2022-09-22 PROCEDURE — 97161 PT EVAL LOW COMPLEX 20 MIN: CPT

## 2022-09-23 ENCOUNTER — OFFICE VISIT (OUTPATIENT)
Dept: FAMILY MEDICINE CLINIC | Facility: CLINIC | Age: 57
End: 2022-09-23
Payer: COMMERCIAL

## 2022-09-23 VITALS
BODY MASS INDEX: 33.27 KG/M2 | RESPIRATION RATE: 16 BRPM | TEMPERATURE: 98.3 F | DIASTOLIC BLOOD PRESSURE: 76 MMHG | HEIGHT: 66 IN | SYSTOLIC BLOOD PRESSURE: 118 MMHG | HEART RATE: 83 BPM | OXYGEN SATURATION: 96 % | WEIGHT: 207 LBS

## 2022-09-23 DIAGNOSIS — F41.1 GENERALIZED ANXIETY DISORDER: ICD-10-CM

## 2022-09-23 DIAGNOSIS — R73.01 IMPAIRED FASTING GLUCOSE: ICD-10-CM

## 2022-09-23 DIAGNOSIS — Z23 NEED FOR VACCINATION: ICD-10-CM

## 2022-09-23 DIAGNOSIS — I10 BENIGN ESSENTIAL HYPERTENSION: Primary | ICD-10-CM

## 2022-09-23 LAB
ALBUMIN SERPL-MCNC: 4.5 G/DL (ref 3.8–4.9)
ALBUMIN/GLOB SERPL: 1.7 {RATIO} (ref 1.2–2.2)
ALP SERPL-CCNC: 98 IU/L (ref 44–121)
ALT SERPL-CCNC: 12 IU/L (ref 0–32)
ANA SER QL: NEGATIVE
AST SERPL-CCNC: 15 IU/L (ref 0–40)
B BURGDOR IGG+IGM SER QL IA: NEGATIVE
BILIRUB SERPL-MCNC: <0.2 MG/DL (ref 0–1.2)
BUN SERPL-MCNC: 21 MG/DL (ref 6–24)
BUN/CREAT SERPL: 22 (ref 9–23)
CALCIUM SERPL-MCNC: 9.7 MG/DL (ref 8.7–10.2)
CHLORIDE SERPL-SCNC: 105 MMOL/L (ref 96–106)
CHOLEST SERPL-MCNC: 186 MG/DL (ref 100–199)
CO2 SERPL-SCNC: 20 MMOL/L (ref 20–29)
CREAT SERPL-MCNC: 0.94 MG/DL (ref 0.57–1)
EGFR: 71 ML/MIN/1.73
ERYTHROCYTE [DISTWIDTH] IN BLOOD BY AUTOMATED COUNT: 13.6 % (ref 11.7–15.4)
ERYTHROCYTE [SEDIMENTATION RATE] IN BLOOD BY WESTERGREN METHOD: 19 MM/HR (ref 0–40)
EST. AVERAGE GLUCOSE BLD GHB EST-MCNC: 111 MG/DL
GLOBULIN SER-MCNC: 2.6 G/DL (ref 1.5–4.5)
GLUCOSE SERPL-MCNC: 99 MG/DL (ref 65–99)
HBA1C MFR BLD: 5.5 % (ref 4.8–5.6)
HCT VFR BLD AUTO: 41.4 % (ref 34–46.6)
HDLC SERPL-MCNC: 55 MG/DL
HGB BLD-MCNC: 13.7 G/DL (ref 11.1–15.9)
LDLC SERPL CALC-MCNC: 107 MG/DL (ref 0–99)
LDLC/HDLC SERPL: 1.9 RATIO (ref 0–3.2)
MCH RBC QN AUTO: 28 PG (ref 26.6–33)
MCHC RBC AUTO-ENTMCNC: 33.1 G/DL (ref 31.5–35.7)
MCV RBC AUTO: 85 FL (ref 79–97)
MICRODELETION SYND BLD/T FISH: NORMAL
PLATELET # BLD AUTO: 301 X10E3/UL (ref 150–450)
POTASSIUM SERPL-SCNC: 4.9 MMOL/L (ref 3.5–5.2)
PROT SERPL-MCNC: 7.1 G/DL (ref 6–8.5)
RBC # BLD AUTO: 4.9 X10E6/UL (ref 3.77–5.28)
RHEUMATOID FACT SERPL-ACNC: 10.4 IU/ML
SL AMB VLDL CHOLESTEROL CALC: 24 MG/DL (ref 5–40)
SODIUM SERPL-SCNC: 142 MMOL/L (ref 134–144)
TRIGL SERPL-MCNC: 136 MG/DL (ref 0–149)
TSH SERPL DL<=0.005 MIU/L-ACNC: 3.09 UIU/ML (ref 0.45–4.5)
WBC # BLD AUTO: 7.8 X10E3/UL (ref 3.4–10.8)

## 2022-09-23 PROCEDURE — 90471 IMMUNIZATION ADMIN: CPT

## 2022-09-23 PROCEDURE — 99214 OFFICE O/P EST MOD 30 MIN: CPT | Performed by: FAMILY MEDICINE

## 2022-09-23 PROCEDURE — 90682 RIV4 VACC RECOMBINANT DNA IM: CPT

## 2022-09-23 NOTE — PROGRESS NOTES
Name: Junior Mccullough      : 1965      MRN: [id number]  Encounter Provider: Theodora Jain DO  Encounter Date: 2022   Encounter department: [address] [place] [address]     1  Benign essential hypertension  Assessment & Plan:  Well controlled   Continue verapamil 120 mg daily    Orders:  -     CBC; Future; Expected date: 2023  -     Comprehensive metabolic panel; Future; Expected date: 2023  -     Lipid Panel with Direct LDL reflex; Future; Expected date: 2023  -     CBC  -     Comprehensive metabolic panel  -     Lipid Panel with Direct LDL reflex    2  Impaired fasting glucose  Assessment & Plan:  Stable  Hemoglobin A1C   Date Value Ref Range Status   2022 5 5 4 8 - 5 6 % Final     Comment:              Prediabetes: 5 7 - 6 4           Diabetes: >6 4           Glycemic control for adults with diabetes: <7 0           Orders:  -     Hemoglobin A1C; Future; Expected date: 2023  -     Hemoglobin A1C    3  Generalized anxiety disorder    4  Need for vaccination  -     influenza vaccine, quadrivalent, recombinant, PF, 0 5 mL, for patients 18 yr+ (FLUBLOK)           Subjective      She has been seeing Podiatry for her plantar fascitis  She has not been able to do her walking because of her pain  Her anxiety has been stable  She is annoyed that she gained weight due to her immobility    Review of Systems   Constitutional: Negative  Respiratory: Negative  Cardiovascular: Negative          Current Outpatient Medications on File Prior to Visit   Medication Sig    cholecalciferol (VITAMIN D3) 1,000 units tablet Take 1 tablet (1,000 Units total) by mouth daily    clobetasol (TEMOVATE) 0 05 % cream Apply topically 2 (two) times a day as needed    clonazePAM (KlonoPIN) 0 5 mg tablet take 1 tablet by mouth three times a day if needed for anxiety    diclofenac sodium (VOLTAREN) 1 % Place on the skin    docusate sodium (COLACE) 100 mg capsule Take 1 capsule (100 mg total) by mouth 2 (two) times a day as needed for constipation    Misc Natural Products (ELDERBERRY ZINC/VIT C/IMMUNE MT) Apply to the mouth or throat    Omega-3 Fatty Acids (fish oil) 1,000 mg Take 1,000 mg by mouth daily    polyethylene glycol (MIRALAX) 17 g packet Take 17 g by mouth daily (Patient taking differently: Take 17 g by mouth if needed)    RESTASIS 0 05 % ophthalmic emulsion Administer to both eyes every 12 (twelve) hours      sertraline (ZOLOFT) 100 mg tablet Take 1 tablet (100 mg total) by mouth daily    sulindac (CLINORIL) 200 MG tablet Take 1 tablet (200 mg total) by mouth 2 (two) times a day for 20 days    verapamil (VERELAN PM) 120 MG 24 hr capsule Take 1 capsule (120 mg total) by mouth daily       Objective     /76   Pulse 83   Temp 98 3 °F (36 8 °C)   Resp 16   Ht 5' 6" (1 676 m)   Wt 93 9 kg (207 lb)   SpO2 96%   BMI 33 41 kg/m²     Physical Exam  Vitals and nursing note reviewed  Constitutional:       Appearance: She is well-developed  HENT:      Head: Normocephalic and atraumatic  Right Ear: Tympanic membrane and external ear normal       Left Ear: Tympanic membrane and external ear normal    Cardiovascular:      Rate and Rhythm: Normal rate and regular rhythm  Heart sounds: Normal heart sounds  No murmur heard  No friction rub  Pulmonary:      Effort: Pulmonary effort is normal  No respiratory distress  Breath sounds: Normal breath sounds  No wheezing or rales  Musculoskeletal:      Right lower leg: No edema  Left lower leg: No edema         Kavya Christine,

## 2022-09-23 NOTE — ASSESSMENT & PLAN NOTE
Stable  Hemoglobin A1C   Date Value Ref Range Status   09/22/2022 5 5 4 8 - 5 6 % Final     Comment:              Prediabetes: 5 7 - 6 4           Diabetes: >6 4           Glycemic control for adults with diabetes: <7 0

## 2022-09-26 ENCOUNTER — OFFICE VISIT (OUTPATIENT)
Dept: PHYSICAL THERAPY | Facility: CLINIC | Age: 57
End: 2022-09-26
Payer: COMMERCIAL

## 2022-09-26 DIAGNOSIS — M76.61 ACHILLES TENDINITIS OF RIGHT LOWER EXTREMITY: Primary | ICD-10-CM

## 2022-09-26 DIAGNOSIS — M79.671 RIGHT FOOT PAIN: ICD-10-CM

## 2022-09-26 PROCEDURE — 97110 THERAPEUTIC EXERCISES: CPT

## 2022-09-26 PROCEDURE — 97140 MANUAL THERAPY 1/> REGIONS: CPT

## 2022-09-26 NOTE — PROGRESS NOTES
Daily Note     Today's date: 2022  Patient name: Alexus Squires  : 1965  MRN: [id number]  Referring provider: Christy Irving DPM  Dx:   Encounter Diagnosis     ICD-10-CM    1  Achilles tendinitis of right lower extremity  M76 61    2  Right foot pain  M79 671        Start Time: 1415  Stop Time: 1505  Total time in clinic (min): 50 minutes    Subjective: Patient reports mild to moderate levels of burning/throbbing pain in posterior heel at the start of today's session  She reports some relief of sx w/ application of kinesiotape during eval  States that she cont to perform standing stretches at wall given to her by podiatrist, experiences reproduction of sx at achilles insertion post       Objective: See treatment diary below      Assessment: Tolerated treatment well  Reviewed technique for gastroc stretch at wall, utilized towel roll under medial aspect of foot to promote subtalar neutral; pt expressed improvement in stretch isolation at mm belly vs at insertion post  Utilized ball distal to malleoli during HR to promote equal supination  Hypomobility noted into subtalar eversion; reduction in pain noted following MT  HEP updated to include HR and gastroc/soleus str, handout provided, pt verbalized good understanding/agreement  Patient will continue to benefit from skilled PT to improve functional mobility and activity tolerance  Plan: Continue per plan of care  Progress per primary PT nv       Eval/ Re-eval Auth #/ Referral # Total visits Start date  Expiration date Total active units  Total manual units  PT only or  PT+OT?   22 NO AUTH 30 pcy                                                      Precautions:   Digestive  Irritable bowel syndrome    GERD (gastroesophageal reflux disease)       Endocrine  Impaired fasting glucose       Respiratory  Allergic rhinitis due to pollen       Cardiovascular and Mediastinum  Benign essential hypertension       Nervous and Auditory  Radiculopathy of lumbar region    Tarsal tunnel syndrome, right    Meralgia paresthetica of left side    Intervertebral disc disorder with radiculopathy of lumbosacral region       Musculoskeletal and Integument  Psoriasis       Genitourinary  Overactive bladder    Lichen sclerosus of female genitalia    Stage 3a chronic kidney disease       Other  Generalized anxiety disorder      Specialty Daily Treatment Diary     Insurance:         Visits: 1 2      Manual 9/21/22 9/26/22      PROM Ankle: all motions, hallux extension  5 min total      STM/MI of soleus, gastroc complex prn   Roller, 5 min      Joint mobs: metatarsals, TC joint  TC AP/PA, gr II-III, 3x30    Subtalar inv/ev, gr II-III, 3x30    Distal fib MWM into DF, x8      Ktape Navicular sling, Space correction directly over achilles tendon Navicular sling, 5 min                      Exercise Diary         PROTOCOL:         Ther ex:         Rec bike/nustep  NuStep, level 3, 8 min      prostretch        Soleus stretch with half roll instruct Standing gastroc/soleus stretch w/ towel under medial heel, 3x30" each      Ankle Tband 4 way        Ankle Df stretch on step  Forward step downs, 4" 2x10              Bilateral heel raises with eccentric lowering on right  Standing HR w/ ball distal to malleoli, 3x10      sidelying hip abd        Side lying clamshells  2x15 each      Standing hip abd heel against wall  Left - 3x10  Right - 2x10                              Neuro Gary:        glute set with bridge progression        Hip burners        SLS advancing to 3 way hip        Rockerboard:  DF/PF  INV/EV                Ther Activity:        Reevaluation                Gait Training:                 HEP:                Modalities:        MH        Ice        Total time:

## 2022-09-29 ENCOUNTER — OFFICE VISIT (OUTPATIENT)
Dept: PHYSICAL THERAPY | Facility: CLINIC | Age: 57
End: 2022-09-29
Payer: COMMERCIAL

## 2022-09-29 DIAGNOSIS — M76.61 ACHILLES TENDINITIS OF RIGHT LOWER EXTREMITY: Primary | ICD-10-CM

## 2022-09-29 DIAGNOSIS — M79.671 RIGHT FOOT PAIN: ICD-10-CM

## 2022-09-29 PROCEDURE — 97110 THERAPEUTIC EXERCISES: CPT

## 2022-09-29 PROCEDURE — 97140 MANUAL THERAPY 1/> REGIONS: CPT

## 2022-09-29 NOTE — PROGRESS NOTES
Daily Note         Today's date: 22  Patient name: Veronica Bran  : 1965  MRN: [id number]  Referring provider: Alberto Lemus DPM  Dx:   Encounter Diagnosis     ICD-10-CM    1  Achilles tendinitis of right lower extremity  M76 61    2  Right foot pain  M79 671        Subjective: Kaye Luna reports she felt tape was helpful after first session  And she feels symtpoms are localized at this time to achilles insertion       Objective: See treatment diary below    Assessment:  excellent tolerance to program today  restriciton still noted on medial soleus with MI  patient entered with gait deviations specifically reduced stance time and reduced roll over  patient was able to self correct after cuing  patient vocalized she feels it can be habitual  The goal of the current treatment is to address patient's functional limitations as well as objective findings         Plan: reduce soft tissue tension and improve strength and mobility        Eval/ Re-eval Auth #/ Referral # Total visits Start date  Expiration date Total active units  Total manual units  PT only or  PT+OT?   22 NO AUTH 30 pcy                                                      Precautions:   Digestive  Irritable bowel syndrome    GERD (gastroesophageal reflux disease)       Endocrine  Impaired fasting glucose       Respiratory  Allergic rhinitis due to pollen       Cardiovascular and Mediastinum  Benign essential hypertension       Nervous and Auditory  Radiculopathy of lumbar region    Tarsal tunnel syndrome, right    Meralgia paresthetica of left side    Intervertebral disc disorder with radiculopathy of lumbosacral region       Musculoskeletal and Integument  Psoriasis       Genitourinary  Overactive bladder    Lichen sclerosus of female genitalia    Stage 3a chronic kidney disease       Other  Generalized anxiety disorder      Specialty Daily Treatment Diary     Insurance:         Visits: 1 2 3/30     Manual 22 9/29/22     PROM Ankle: all motions, hallux extension  5 min total --     STM/MI of soleus, gastroc complex prn   Roller, 5 min 5 min roller MI of medial soleus     Joint mobs: metatarsals, TC joint  TC AP/PA, gr II-III, 3x30    Subtalar inv/ev, gr II-III, 3x30    Distal fib MWM into DF, x8 Performed    Subtalar inv/ev 3x30    Distal fib MWM into DF, x8     Ktape Navicular sling, Space correction directly over achilles tendon Navicular sling, 5 min Navicular sling and  Space correction directly over achilles tendon                     Exercise Diary         PROTOCOL:         Ther ex:         Rec bike/nustep  NuStep, level 3, 8 min --     prostretch        Soleus stretch with half roll instruct Standing gastroc/soleus stretch w/ towel under medial heel, 3x30" each Standing gastroc/soleus stretch w/ towel under medial heel, 3x30" each     Ankle Tband 4 way        Ankle Df stretch on step  Forward step downs, 4" 2x10 Forward step downs, 4" 2x10             Bilateral heel raises with eccentric lowering on right  Standing HR w/ ball distal to malleoli, 3x10 Standing HR w/ ball distal to malleoli, 3x10     sidelying hip abd   Right: 10x 2   left 10 x 2      Side lying clamshells  2x15 each 15 x 2 ad      Standing hip abd heel against wall  Left - 3x10  Right - 2x10 Next visit                              Neuro Gary:        glute set with bridge progression        Hip burners        SLS advancing to 3 way hip        Rockerboard:  DF/PF  INV/EV                Ther Activity:        Reevaluation                Gait Training:                 HEP:                Modalities:        MH        Ice        Total time:

## 2022-10-03 ENCOUNTER — APPOINTMENT (OUTPATIENT)
Dept: PHYSICAL THERAPY | Facility: CLINIC | Age: 57
End: 2022-10-03

## 2022-10-04 ENCOUNTER — OFFICE VISIT (OUTPATIENT)
Dept: PODIATRY | Facility: CLINIC | Age: 57
End: 2022-10-04
Payer: COMMERCIAL

## 2022-10-04 ENCOUNTER — OFFICE VISIT (OUTPATIENT)
Dept: PHYSICAL THERAPY | Facility: CLINIC | Age: 57
End: 2022-10-04
Payer: COMMERCIAL

## 2022-10-04 VITALS
SYSTOLIC BLOOD PRESSURE: 118 MMHG | WEIGHT: 207 LBS | DIASTOLIC BLOOD PRESSURE: 76 MMHG | BODY MASS INDEX: 33.27 KG/M2 | HEIGHT: 66 IN | RESPIRATION RATE: 17 BRPM

## 2022-10-04 DIAGNOSIS — M76.61 ACHILLES TENDINITIS OF RIGHT LOWER EXTREMITY: Primary | ICD-10-CM

## 2022-10-04 DIAGNOSIS — M79.671 RIGHT FOOT PAIN: ICD-10-CM

## 2022-10-04 PROCEDURE — 97140 MANUAL THERAPY 1/> REGIONS: CPT

## 2022-10-04 PROCEDURE — 20551 NJX 1 TENDON ORIGIN/INSJ: CPT | Performed by: PODIATRIST

## 2022-10-04 PROCEDURE — 97110 THERAPEUTIC EXERCISES: CPT

## 2022-10-04 RX ORDER — SULINDAC 200 MG/1
200 TABLET ORAL 2 TIMES DAILY
Qty: 40 TABLET | Refills: 0 | Status: SHIPPED | OUTPATIENT
Start: 2022-10-04 | End: 2022-10-24

## 2022-10-04 NOTE — PROGRESS NOTES
Assessment/Plan:  Achilles tendinitis with retrocalcaneal spurring right foot  Pain  Plan  Foot exam performed  Patient educated on condition  Patient will continue therapy  She will stay on Clinoril  Today repeat injection done  1 25 cc dexamethasone phosphate injected into right heel without pain or complication  Return for follow-up and x-ray         Diagnoses and all orders for this visit:    Achilles tendinitis of right lower extremity  -     sulindac (CLINORIL) 200 MG tablet; Take 1 tablet (200 mg total) by mouth 2 (two) times a day for 20 days    Right foot pain  -     sulindac (CLINORIL) 200 MG tablet; Take 1 tablet (200 mg total) by mouth 2 (two) times a day for 20 days          Subjective:  Patient is somewhat better  She is now undergoing physical therapy  She is taking medication    Allergies   Allergen Reactions    Bee Venom Anaphylaxis     Reaction Date: [id number];   Reaction Date: 23Oct2006;     Meperidine GI Intolerance     DEMEROL vomiting  DEMEROL vomiting    Penicillins      Reaction Date: 25Jul2005;  Annotation - [id number]: as infant - throat closes, difficult breathing         Current Outpatient Medications:     sulindac (CLINORIL) 200 MG tablet, Take 1 tablet (200 mg total) by mouth 2 (two) times a day for 20 days, Disp: 40 tablet, Rfl: 0    cholecalciferol (VITAMIN D3) 1,000 units tablet, Take 1 tablet (1,000 Units total) by mouth daily, Disp: 30 tablet, Rfl: 0    clobetasol (TEMOVATE) 0 05 % cream, Apply topically 2 (two) times a day as needed, Disp: , Rfl:     clonazePAM (KlonoPIN) 0 5 mg tablet, take 1 tablet by mouth three times a day if needed for anxiety, Disp: 90 tablet, Rfl: 0    diclofenac sodium (VOLTAREN) 1 %, Place on the skin, Disp: , Rfl:     docusate sodium (COLACE) 100 mg capsule, Take 1 capsule (100 mg total) by mouth 2 (two) times a day as needed for constipation, Disp: 60 capsule, Rfl: 0    Misc Natural Products (ELDERBERRY ZINC/VIT C/IMMUNE MT), Apply to the mouth or throat, Disp: , Rfl:     Omega-3 Fatty Acids (fish oil) 1,000 mg, Take 1,000 mg by mouth daily, Disp: , Rfl:     polyethylene glycol (MIRALAX) 17 g packet, Take 17 g by mouth daily (Patient taking differently: Take 17 g by mouth if needed), Disp: 14 each, Rfl: 0    RESTASIS 0 05 % ophthalmic emulsion, Administer to both eyes every 12 (twelve) hours  , Disp: , Rfl: 0    sertraline (ZOLOFT) 100 mg tablet, Take 1 tablet (100 mg total) by mouth daily, Disp: 90 tablet, Rfl: 1    verapamil (VERELAN PM) 120 MG 24 hr capsule, Take 1 capsule (120 mg total) by mouth daily, Disp: 90 capsule, Rfl: 1    Patient Active Problem List   Diagnosis    Radiculopathy of lumbar region    Tarsal tunnel syndrome, right    Psoriasis    Overactive bladder    Lichen sclerosus of female genitalia    Irritable bowel syndrome    Impaired fasting glucose    Allergic rhinitis due to pollen    Generalized anxiety disorder    Benign essential hypertension    Meralgia paresthetica of left side    Intervertebral disc disorder with radiculopathy of lumbosacral region    Stage 3a chronic kidney disease    GERD (gastroesophageal reflux disease)          Patient ID: Shona Neal is a 62 y o  female  HPI    The following portions of the patient's history were reviewed and updated as appropriate:     family history includes Anxiety disorder in her mother; Arthritis in her father and mother; Atrial fibrillation in her father; Breast cancer (age of onset: 79) in her maternal grandmother; Diabetes in her mother; Heart disease in her mother; No Known Problems in her brother and maternal aunt; Obesity in her sister  reports that she has never smoked  She has never used smokeless tobacco  She reports current alcohol use of about 2 0 standard drinks of alcohol per week  She reports that she does not use drugs      Vitals:    10/04/22 1625   BP: 118/76   Resp: 17       Review of Systems      Objective:  Patient's shoes and socks removed  Foot ExamPhysical Exam        Foot Exam     General  General Appearance: appears stated age and healthy   Orientation: alert and oriented to person, place, and time   Affect: appropriate   Gait: antalgic         Right Foot/Ankle      Inspection and Palpation  Ecchymosis: none  Tenderness: bony tenderness   Swelling: dorsum   Arch: pes planus  Hammertoes: fifth toe  Hallux valgus: no  Hallux limitus: yes     Neurovascular  Dorsalis pedis: 3+  Posterior tibial: 3+  Saphenous nerve sensation: normal  Tibial nerve sensation: normal  Superficial peroneal nerve sensation: normal  Deep peroneal nerve sensation: normal  Sural nerve sensation: normal        Left Foot/Ankle       Inspection and Palpation  Ecchymosis: none  Swelling: dorsum   Arch: pes planus  Hammertoes: fifth toe  Hallux valgus: no  Hallux limitus: yes     Neurovascular  Dorsalis pedis: 3+  Posterior tibial: 3+  Saphenous nerve sensation: normal  Tibial nerve sensation: normal  Superficial peroneal nerve sensation: normal  Deep peroneal nerve sensation: normal  Sural nerve sensation: normal           Physical Exam  Vitals and nursing note reviewed  Constitutional:       Appearance: Normal appearance  Cardiovascular:      Rate and Rhythm: Normal rate and regular rhythm       Pulses:           Dorsalis pedis pulses are 3+ on the right side and 3+ on the left side         Posterior tibial pulses are 3+ on the right side and 3+ on the left side  Musculoskeletal:      Right foot: Bony tenderness present  No bunion   Pain with palpation plantar fascia insertion right foot     Left foot: No bunion       Comments:   Patient is pronated in stance and gait   She has pain with palpation right Achilles tendon insertion    Retrocalcaneal spur palpated

## 2022-10-04 NOTE — PROGRESS NOTES
Daily Note     Today's date: 10/4/2022  Patient name: Lewis Oropeza  : 1965  MRN: [id number]  Referring provider: Vel Raymond DPM  Dx:   Encounter Diagnosis     ICD-10-CM    1  Achilles tendinitis of right lower extremity  M76 61    2  Right foot pain  M79 671        Start Time: 1330  Stop Time: 1415  Total time in clinic (min): 45 minutes    Subjective: Patient states her pain is more localized to achilles insertion, although it continues to come and go  Cont agg factors include standing for prolonged periods of time and ascending stairs  Reports minimal sx at the start of today's session  Objective: See treatment diary below      Assessment: Tolerated treatment well  Initiated cupping over gastroc and achilles during today's session secondary to cont hypertonicity about medial head; kesha well  Pt provided w/ graded verbal and tactile cues during glute train to improve glute med isolation and limit TFL compensation  Patient will continue to benefit from skilled PT to improve functional mobility and activity tolerance  Plan: Continue per plan of care        Eval/ Re-eval Auth #/ Referral # Total visits Start date  Expiration date Total active units  Total manual units  PT only or  PT+OT?   22 NO AUTH 30 pcy                                                      Precautions:   Digestive  Irritable bowel syndrome    GERD (gastroesophageal reflux disease)       Endocrine  Impaired fasting glucose       Respiratory  Allergic rhinitis due to pollen       Cardiovascular and Mediastinum  Benign essential hypertension       Nervous and Auditory  Radiculopathy of lumbar region    Tarsal tunnel syndrome, right    Meralgia paresthetica of left side    Intervertebral disc disorder with radiculopathy of lumbosacral region       Musculoskeletal and Integument  Psoriasis       Genitourinary  Overactive bladder    Lichen sclerosus of female genitalia    Stage 3a chronic kidney disease       Other  Generalized anxiety disorder      Specialty Daily Treatment Diary     Insurance:         Visits: 1 2 3/30 4/30    Manual 9/21/22 9/26/22 9/29/22 8/4/22    PROM Ankle: all motions, hallux extension  5 min total --     STM/MI of soleus, gastroc complex prn   Roller, 5 min 5 min roller MI of medial soleus Active cupping over gastroc, achilles insertion 10 min    Joint mobs: metatarsals, TC joint  TC AP/PA, gr II-III, 3x30    Subtalar inv/ev, gr II-III, 3x30    Distal fib MWM into DF, x8 Performed    Subtalar inv/ev 3x30    Distal fib MWM into DF, x8     Ktape Navicular sling, Space correction directly over achilles tendon Navicular sling, 5 min Navicular sling and  Space correction directly over achilles tendon Navicular sling and  Space correction directly over achilles tendon                    Exercise Diary         PROTOCOL:         Ther ex:         Rec bike/nustep  NuStep, level 3, 8 min --     prostretch        Soleus stretch with half roll instruct Standing gastroc/soleus stretch w/ towel under medial heel, 3x30" each Standing gastroc/soleus stretch w/ towel under medial heel, 3x30" each hold    Ankle Tband 4 way        Ankle Df stretch on step  Forward step downs, 4" 2x10 Forward step downs, 4" 2x10 Forward step downs, 4" 2x10            Bilateral heel raises with eccentric lowering on right  Standing HR w/ ball distal to malleoli, 3x10 Standing HR w/ ball distal to malleoli, 3x10 1st set - regular, x15  2nd set - weightshift for ecc lower on right, x15  3rd set - regular, x15    sidelying hip abd   Right: 10x 2   left 10 x 2  3x10 each    Side lying clamshells  2x15 each 15 x 2 ad  3x15 each    Standing hip abd heel against wall  Left - 3x10  Right - 2x10 Next visit  2x10 each                            Neuro Gary:        glute set with bridge progression        Hip burners        SLS advancing to 3 way hip        Rockerboard:  DF/PF  INV/EV                Ther Activity:        Reevaluation                Gait Training: HEP:                Modalities:                Ice        Total time:

## 2022-10-06 ENCOUNTER — OFFICE VISIT (OUTPATIENT)
Dept: PHYSICAL THERAPY | Facility: CLINIC | Age: 57
End: 2022-10-06
Payer: COMMERCIAL

## 2022-10-06 DIAGNOSIS — M76.61 ACHILLES TENDINITIS OF RIGHT LOWER EXTREMITY: Primary | ICD-10-CM

## 2022-10-06 DIAGNOSIS — M79.671 RIGHT FOOT PAIN: ICD-10-CM

## 2022-10-06 PROCEDURE — 97140 MANUAL THERAPY 1/> REGIONS: CPT

## 2022-10-06 PROCEDURE — 97110 THERAPEUTIC EXERCISES: CPT

## 2022-10-06 NOTE — PROGRESS NOTES
Daily Note         Today's date: 10/6/2022  Patient name: Queta Chilel  : 1965  MRN: [id number]  Referring provider: Yola Kahn DPM  Dx:   Encounter Diagnosis     ICD-10-CM    1  Achilles tendinitis of right lower extremity  M76 61    2  Right foot pain  M79 671        Subjective: Octaviano Luna reports pain level entering today is 2/10  States she had an injection on Tuesday with no significant change  Objective: See treatment diary below    Assessment:  patient reported reduction in symtpoms after grastin technique was used on medial aspect of right achilles tendon insertion  followwed but my IASTM of calf where most restriction was noted on medial soleus mid calf  patient vocalized purchasing a heel cup for shoes  will assess its efficacy next session    The goal of the current treatment is to address patient's functional limitations as well as objective findings         Plan: assess efficacy of heel cup next session        Eval/ Re-eval Auth #/ Referral # Total visits Start date  Expiration date Total active units  Total manual units  PT only or  PT+OT?   22 NO AUTH 30 pcy                                                      Precautions:   Digestive  Irritable bowel syndrome    GERD (gastroesophageal reflux disease)       Endocrine  Impaired fasting glucose       Respiratory  Allergic rhinitis due to pollen       Cardiovascular and Mediastinum  Benign essential hypertension       Nervous and Auditory  Radiculopathy of lumbar region    Tarsal tunnel syndrome, right    Meralgia paresthetica of left side    Intervertebral disc disorder with radiculopathy of lumbosacral region       Musculoskeletal and Integument  Psoriasis       Genitourinary  Overactive bladder    Lichen sclerosus of female genitalia    Stage 3a chronic kidney disease       Other  Generalized anxiety disorder      Specialty Daily Treatment Diary     Insurance:         Visits: 1 2 3/30 4/30 5/30   Manual 22 9/26/22 9/29/22 10/4/22 10/6/22   PROM Ankle: all motions, hallux extension  5 min total --  performed   STM/MI of soleus, gastroc complex prn   Roller, 5 min 5 min roller MI of medial soleus Active cupping over gastroc, achilles insertion 10 min Gastin technique on medial achilles tendon insertion and MI of meidal soleus mid tibia      Joint mobs: metatarsals, TC joint  TC AP/PA, gr II-III, 3x30    Subtalar inv/ev, gr II-III, 3x30    Distal fib MWM into DF, x8 Performed    Subtalar inv/ev 3x30    Distal fib MWM into DF, x8  Performed meta: grade IV  TC joint: grade IV   Ktape Navicular sling, Space correction directly over achilles tendon Navicular sling, 5 min Navicular sling and  Space correction directly over achilles tendon Navicular sling and  Space correction directly over achilles tendon                    Exercise Diary         PROTOCOL:         Ther ex:         Rec bike/nustep  NuStep, level 3, 8 min --  7 min NS: lv 3    prostretch        Soleus stretch with half roll instruct Standing gastroc/soleus stretch w/ towel under medial heel, 3x30" each Standing gastroc/soleus stretch w/ towel under medial heel, 3x30" each hold    Ankle Tband 4 way        Ankle Df stretch on step  Forward step downs, 4" 2x10 Forward step downs, 4" 2x10 Forward step downs, 4" 2x10 6 in 2 x 10             Bilateral heel raises with eccentric lowering on right  Standing HR w/ ball distal to malleoli, 3x10 Standing HR w/ ball distal to malleoli, 3x10 1st set - regular, x15  2nd set - weightshift for ecc lower on right, x15  3rd set - regular, x15 1st set - regular, x15  2nd set - weightshift for ecc lower on right, x15  3rd set - regular, x15   sidelying hip abd   Right: 10x 2   left 10 x 2  3x10 each -   Side lying clamshells  2x15 each 15 x 2 ad  3x15 each -   Standing hip abd heel against wall  Left - 3x10  Right - 2x10 Next visit  2x10 each 2 x 10  Each    Hip burners                         Neuro Gary:        glute set with bridge progression        Hip burners        SLS advancing to 3 way hip        Rockerboard:  DF/PF  INV/EV                Ther Activity:        Reevaluation                Gait Training:                 HEP:                Modalities:        MH        Ice        Total time:

## 2022-10-13 ENCOUNTER — APPOINTMENT (OUTPATIENT)
Dept: PHYSICAL THERAPY | Facility: CLINIC | Age: 57
End: 2022-10-13

## 2022-10-17 ENCOUNTER — OFFICE VISIT (OUTPATIENT)
Dept: PHYSICAL THERAPY | Facility: CLINIC | Age: 57
End: 2022-10-17
Payer: COMMERCIAL

## 2022-10-17 DIAGNOSIS — M76.61 ACHILLES TENDINITIS OF RIGHT LOWER EXTREMITY: Primary | ICD-10-CM

## 2022-10-17 DIAGNOSIS — M79.671 RIGHT FOOT PAIN: ICD-10-CM

## 2022-10-17 PROCEDURE — 97110 THERAPEUTIC EXERCISES: CPT

## 2022-10-17 PROCEDURE — 97140 MANUAL THERAPY 1/> REGIONS: CPT

## 2022-10-17 PROCEDURE — 97530 THERAPEUTIC ACTIVITIES: CPT

## 2022-10-17 NOTE — PROGRESS NOTES
Daily Note     Today's date: 10/17/2022  Patient name: Nataliia Ndiaye  : 1965  MRN: [id number]  Referring provider: Mahnaz Fernandez DPM  Dx:   Encounter Diagnosis     ICD-10-CM    1  Achilles tendinitis of right lower extremity  M76 61    2  Right foot pain  M79 671        Start Time: 1330  Stop Time: 1410  Total time in clinic (min): 40 minutes    Subjective: "Things are staying pretty much the same  It's almost always at a 3/10  Things aren't worse, but they aren't necessarily better either  The pain has been a bit better since I started wearing the heel cups " Patient states she cont to perform stretching activities 1-2x/day      Objective: See treatment diary below      Assessment: Tolerated treatment well  Limited range of motion during HR to neutral DF to limit impingement; patient with reduction in sharp pain at achilles insertion noted post  Shifted plan of care to emphasize tendon loading vs soft tissue stretching; discussed pain monitoring model, including exercising to max 5/10 discomfort, although avoiding sharp pain/impingement at insertion  HEP updated to reflect aforementioned education and pt expressed no questions/concerns w/ updates made to plan  Patient will continue to benefit from skilled PT to improve functional mobility and symptom irritability  Plan: Continue per plan of care        Eval/ Re-eval Auth #/ Referral # Total visits Start date  Expiration date Total active units  Total manual units  PT only or  PT+OT?   22 NO AUTH 30 pcy                                                      Precautions:   Digestive  Irritable bowel syndrome    GERD (gastroesophageal reflux disease)       Endocrine  Impaired fasting glucose       Respiratory  Allergic rhinitis due to pollen       Cardiovascular and Mediastinum  Benign essential hypertension       Nervous and Auditory  Radiculopathy of lumbar region    Tarsal tunnel syndrome, right    Meralgia paresthetica of left side    Intervertebral disc disorder with radiculopathy of lumbosacral region       Musculoskeletal and Integument  Psoriasis       Genitourinary  Overactive bladder    Lichen sclerosus of female genitalia    Stage 3a chronic kidney disease       Other  Generalized anxiety disorder      Specialty Daily Treatment Diary     Insurance:         Visits: 2 3/30 4/30 5/30 6/30   Manual 9/26/22 9/29/22 10/4/22 10/6/22 10/17/22   PROM Ankle: all motions, hallux extension 5 min total --  performed    STM/MI of soleus, gastroc complex prn  Roller, 5 min 5 min roller MI of medial soleus Active cupping over gastroc, achilles insertion 10 min Gastin technique on medial achilles tendon insertion and MI of meidal soleus mid tibia    [place]aston to achilles tendon, 8 min   Joint mobs: metatarsals, TC joint TC AP/PA, gr II-III, 3x30    Subtalar inv/ev, gr II-III, 3x30    Distal fib MWM into DF, x8 Performed    Subtalar inv/ev 3x30    Distal fib MWM into DF, x8  Performed meta: grade IV  TC joint: grade IV TC AP/PA gr II-III, 3x30    Subtalar inv/ev, gr II-III, 3x30   Ktape Navicular sling, 5 min Navicular sling and  Space correction directly over achilles tendon Navicular sling and  Space correction directly over achilles tendon  Navicular sling and  Space correction directly over achilles tendon                   Exercise Diary         PROTOCOL:         Ther ex:         Rec bike/nustep NuStep, level 3, 8 min --  7 min NS: lv 3     prostretch        Soleus stretch with half roll Standing gastroc/soleus stretch w/ towel under medial heel, 3x30" each Standing gastroc/soleus stretch w/ towel under medial heel, 3x30" each hold     Ankle Tband 4 way        Ankle Df stretch on step Forward step downs, 4" 2x10 Forward step downs, 4" 2x10 Forward step downs, 4" 2x10 6 in 2 x 10              Bilateral heel raises with eccentric lowering on right Standing HR w/ ball distal to malleoli, 3x10 Standing HR w/ ball distal to malleoli, 3x10 1st set - regular, x15  2nd set - weightshift for ecc lower on right, x15  3rd set - regular, x15 1st set - regular, x15  2nd set - weightshift for ecc lower on right, x15  3rd set - regular, x15 Standing HR (gastroc bias):  1st set - regular, x15  2nd set - weightshift for ecc lower on right, x15  3rd set - regular, x15    Sitting HR (soleus bias): with 25# weight on lap, 3x15    Unilateral HR on leg press: with block, 65#, 3x10   sidelying hip abd  Right: 10x 2   left 10 x 2  3x10 each -    Side lying clamshells 2x15 each 15 x 2 ad  3x15 each -    Standing hip abd heel against wall Left - 3x10  Right - 2x10 Next visit  2x10 each 2 x 10  Each     Hip burners                         Neuro Gary:        glute set with bridge progression        Hip burners        SLS advancing to 3 way hip        Rockerboard:  DF/PF  INV/EV                Ther Activity:        Reevaluation             Pt edu, PT POC, HEP 10 min   Gait Training:                 HEP:                Modalities:        MH        Ice        Total time:

## 2022-10-20 ENCOUNTER — OFFICE VISIT (OUTPATIENT)
Dept: PHYSICAL THERAPY | Facility: CLINIC | Age: 57
End: 2022-10-20
Payer: COMMERCIAL

## 2022-10-20 ENCOUNTER — OFFICE VISIT (OUTPATIENT)
Dept: FAMILY MEDICINE CLINIC | Facility: CLINIC | Age: 57
End: 2022-10-20
Payer: COMMERCIAL

## 2022-10-20 VITALS
HEART RATE: 69 BPM | BODY MASS INDEX: 33.27 KG/M2 | SYSTOLIC BLOOD PRESSURE: 130 MMHG | RESPIRATION RATE: 16 BRPM | WEIGHT: 207 LBS | HEIGHT: 66 IN | TEMPERATURE: 97.5 F | OXYGEN SATURATION: 100 % | DIASTOLIC BLOOD PRESSURE: 80 MMHG

## 2022-10-20 DIAGNOSIS — M79.671 RIGHT FOOT PAIN: Primary | ICD-10-CM

## 2022-10-20 DIAGNOSIS — M76.61 ACHILLES TENDINITIS OF RIGHT LOWER EXTREMITY: ICD-10-CM

## 2022-10-20 DIAGNOSIS — R10.13 EPIGASTRIC ABDOMINAL PAIN: Primary | ICD-10-CM

## 2022-10-20 DIAGNOSIS — R19.7 DIARRHEA, UNSPECIFIED TYPE: ICD-10-CM

## 2022-10-20 PROCEDURE — 97140 MANUAL THERAPY 1/> REGIONS: CPT | Performed by: PHYSICAL THERAPIST

## 2022-10-20 PROCEDURE — 99214 OFFICE O/P EST MOD 30 MIN: CPT | Performed by: FAMILY MEDICINE

## 2022-10-20 PROCEDURE — 97110 THERAPEUTIC EXERCISES: CPT | Performed by: PHYSICAL THERAPIST

## 2022-10-20 PROCEDURE — 97530 THERAPEUTIC ACTIVITIES: CPT | Performed by: PHYSICAL THERAPIST

## 2022-10-20 NOTE — PROGRESS NOTES
Name: Lewis Oropeza      : 1965      MRN: [id number]  Encounter Provider: Bebo James MD  Encounter Date: 10/20/2022   Encounter department: [facility]     1  Epigastric abdominal pain  Comments:  Possible GERD vs ulcer vs gall bladder disease  Will get US  She will start OTC PPI or H2 blocker  May need endoscopy with Gi if symptoms persist    Orders:  -     US abdomen complete; Future; Expected date: 10/20/2022    2  Diarrhea, unspecified type  Comments:  Unclear etilogy  She will keep a diary of possible triggers  Follow up with GI         Subjective      Abdominal Pain  This is a new problem  The current episode started yesterday  The onset quality is sudden  The most recent episode lasted 1 hour  The pain is located in the epigastric region  The pain is at a severity of 3/10  The pain is moderate  The quality of the pain is burning and sharp  Associated symptoms include belching, diarrhea (diarrhea has been for the past 2 months intermittently), flatus and nausea  Pertinent negatives include no anorexia, arthralgias, constipation, dysuria, fever, frequency, headaches, hematochezia, hematuria, melena, myalgias, vomiting or weight loss  Nothing aggravates the pain  The pain is relieved by nothing  She has tried antacids for the symptoms  The treatment provided no relief  Diarrhea   This is a new problem  Episode onset: 2 months  The problem has been unchanged  The stool consistency is described as watery  Associated symptoms include abdominal pain, bloating and increased flatus  Pertinent negatives include no arthralgias, chills, coughing, fever, headaches, myalgias, sweats, URI, vomiting or weight loss  The symptoms are aggravated by dairy products  There are no known risk factors  She has tried nothing for the symptoms  Review of Systems   Constitutional: Negative for chills, fever and weight loss  Respiratory: Negative for cough      Gastrointestinal: Positive for abdominal pain, bloating, diarrhea (diarrhea has been for the past 2 months intermittently), flatus and nausea  Negative for anorexia, constipation, hematochezia, melena and vomiting  Genitourinary: Negative for dysuria, frequency and hematuria  Musculoskeletal: Negative for arthralgias and myalgias  Neurological: Negative for headaches  Current Outpatient Medications on File Prior to Visit   Medication Sig   • cholecalciferol (VITAMIN D3) 1,000 units tablet Take 1 tablet (1,000 Units total) by mouth daily   • clobetasol (TEMOVATE) 0 05 % cream Apply topically 2 (two) times a day as needed   • clonazePAM (KlonoPIN) 0 5 mg tablet take 1 tablet by mouth three times a day if needed for anxiety   • diclofenac sodium (VOLTAREN) 1 % Place on the skin   • docusate sodium (COLACE) 100 mg capsule Take 1 capsule (100 mg total) by mouth 2 (two) times a day as needed for constipation   • Misc Natural Products (ELDERBERRY ZINC/VIT C/IMMUNE MT) Apply to the mouth or throat   • Omega-3 Fatty Acids (fish oil) 1,000 mg Take 1,000 mg by mouth daily   • RESTASIS 0 05 % ophthalmic emulsion Administer to both eyes every 12 (twelve) hours     • sertraline (ZOLOFT) 100 mg tablet Take 1 tablet (100 mg total) by mouth daily   • sulindac (CLINORIL) 200 MG tablet Take 1 tablet (200 mg total) by mouth 2 (two) times a day for 20 days   • verapamil (VERELAN PM) 120 MG 24 hr capsule Take 1 capsule (120 mg total) by mouth daily   • polyethylene glycol (MIRALAX) 17 g packet Take 17 g by mouth daily (Patient taking differently: Take 17 g by mouth if needed)       Objective     /80   Pulse 69   Temp 97 5 °F (36 4 °C)   Resp 16   Ht 5' 6" (1 676 m)   Wt 93 9 kg (207 lb)   SpO2 100%   BMI 33 41 kg/m²     Physical Exam  Constitutional:       General: She is not in acute distress  Appearance: She is well-developed  She is not diaphoretic  HENT:      Head: Normocephalic and atraumatic     Cardiovascular: Rate and Rhythm: Normal rate and regular rhythm  Heart sounds: Normal heart sounds  No murmur heard  No friction rub  No gallop  Pulmonary:      Effort: Pulmonary effort is normal  No respiratory distress  Breath sounds: Normal breath sounds  No wheezing or rales  Chest:      Chest wall: No tenderness  Abdominal:      General: Abdomen is flat  Bowel sounds are normal  There is no distension  Palpations: Abdomen is soft  There is no mass  Tenderness: There is no abdominal tenderness  There is no right CVA tenderness, left CVA tenderness, guarding or rebound  Hernia: No hernia is present  Musculoskeletal:         General: No deformity  Normal range of motion  Cervical back: Normal range of motion and neck supple  Skin:     General: Skin is warm and dry  Neurological:      Mental Status: She is alert and oriented to person, place, and time  Psychiatric:         Behavior: Behavior normal          Thought Content:  Thought content normal          Judgment: Judgment normal        Lex Steiner MD

## 2022-10-20 NOTE — PROGRESS NOTES
Daily Note     Today's date: 10/20/2022  Patient name: Vanessa Cobos  : 1965  MRN: [id number]  Referring provider: Frankie Ocampo DPM  Dx:   Encounter Diagnosis     ICD-10-CM    1  Right foot pain  M79 671    2  Achilles tendinitis of right lower extremity  M76 61                   Subjective: Pt reports 2-310 tonight, she feels she is at a bit of a plateau  Slight increase in pain during today's session with therex, 3-4/10 with progressing challenge      Objective: See treatment diary below  Assessment: Tolerated treatment well  Patient demo overall nil adverse effects and feels confident with her aping technique at this time  Pt demo good technique thorughout her therex, she also demo good control through eccentrics without reactivity  Plan: Continue per plan of care        Eval/ Re-eval Auth #/ Referral # Total visits Start date  Expiration date Total active units  Total manual units  PT only or  PT+OT?   22 NO AUTH 30 pcy                                                      Precautions:   Digestive  Irritable bowel syndrome    GERD (gastroesophageal reflux disease)       Endocrine  Impaired fasting glucose       Respiratory  Allergic rhinitis due to pollen       Cardiovascular and Mediastinum  Benign essential hypertension       Nervous and Auditory  Radiculopathy of lumbar region    Tarsal tunnel syndrome, right    Meralgia paresthetica of left side    Intervertebral disc disorder with radiculopathy of lumbosacral region       Musculoskeletal and Integument  Psoriasis       Genitourinary  Overactive bladder    Lichen sclerosus of female genitalia    Stage 3a chronic kidney disease       Other  Generalized anxiety disorder      Specialty Daily Treatment Diary     Insurance:         Visits: 3/30 4/30 5/30 6/30 7/30   Manual 9/29/22 10/4/22 10/6/22 10/17/22 10/20/22   PROM Ankle: all motions, hallux extension --  performed     STM/MI of soleus, gastroc complex prn  5 min roller MI of medial soleus Active cupping over gastroc, achilles insertion 10 min Gastin technique on medial achilles tendon insertion and MI of meidal soleus mid tibia    Graston to achilles tendon, 8 min Graston to achilles tendon, 8 min   Joint mobs: metatarsals, TC joint Performed    Subtalar inv/ev 3x30    Distal fib MWM into DF, x8  Performed meta: grade IV  TC joint: grade IV TC AP/PA gr II-III, 3x30    Subtalar inv/ev, gr II-III, 3x30 TC AP/PA gr II-III, 3x20    Subtalar inv/ev, gr II-III, 3x20   Ktape Navicular sling and  Space correction directly over achilles tendon Navicular sling and  Space correction directly over achilles tendon  Navicular sling and  Space correction directly over achilles tendon Navicular sling and  Space correction directly over achilles tendon                   Exercise Diary         PROTOCOL:         Ther ex:         Rec bike/nustep --  7 min NS: lv 3      prostretch        Soleus stretch with half roll Standing gastroc/soleus stretch w/ towel under medial heel, 3x30" each hold      Ankle Tband 4 way        Ankle Df stretch on step Forward step downs, 4" 2x10 Forward step downs, 4" 2x10 6 in 2 x 10               Bilateral heel raises with eccentric lowering on right Standing HR w/ ball distal to malleoli, 3x10 1st set - regular, x15  2nd set - weightshift for ecc lower on right, x15  3rd set - regular, x15 1st set - regular, x15  2nd set - weightshift for ecc lower on right, x15  3rd set - regular, x15 Standing HR (gastroc bias):  1st set - regular, x15  2nd set - weightshift for ecc lower on right, x15  3rd set - regular, x15    Sitting HR (soleus bias): with 25# weight on lap, 3x15    Unilateral HR on leg press: with block, 65#, 3x10 Standing HR (gastroc bias):  1st set - regular, x15  2nd set - weightshift for ecc lower on right, x15  3rd set - regular, x15    Sitting HR (soleus bias): with 25# weight on lap, 2x25    Unilateral HR on leg press: with block, 65#, 3x10   sidelying hip abd Right: 10x 2   left 10 x 2  3x10 each -     Side lying clamshells 15 x 2 ad  3x15 each -     Standing hip abd heel against wall Next visit  2x10 each 2 x 10  Each      Hip burners                         Neuro Gary:        glute set with bridge progression        Hip burners        SLS advancing to 3 way hip        Rockerboard:  DF/PF  INV/EV                Ther Activity:        Reevaluation            Pt edu, PT POC, HEP 10 min    Gait Training:                 HEP:                Modalities:        MH        Ice        Total time:

## 2022-10-20 NOTE — PATIENT INSTRUCTIONS
GERD (Gastroesophageal Reflux Disease)   WHAT YOU NEED TO KNOW:   Gastroesophageal reflux disease (GERD) is reflux that happens more than 2 times a week for a few weeks  Reflux means acid and food in your stomach back up into your esophagus  GERD can cause other health problems over time if it is not treated  DISCHARGE INSTRUCTIONS:   Call your local emergency number (911 in the [address] [facility]) if:   You have severe chest pain and sudden trouble breathing  Return to the emergency department if:   You have trouble breathing after you vomit  You have trouble swallowing, or pain with swallowing  Your bowel movements are black, bloody, or tarry-looking  Your vomit looks like coffee grounds or has blood in it  Call your doctor or gastroenterologist if:   You feel full and cannot burp or vomit  You vomit large amounts, or you vomit often  You are losing weight without trying  Your symptoms get worse or do not improve with treatment  You have questions or concerns about your condition or care  Medicines:   Medicines  are used to decrease stomach acid  Medicine may also be used to help your lower esophageal sphincter and stomach contract (tighten) more  Take your medicine as directed  Contact your healthcare provider if you think your medicine is not helping or if you have side effects  Tell him of her if you are allergic to any medicine  Keep a list of the medicines, vitamins, and herbs you take  Include the amounts, and when and why you take them  Bring the list or the pill bottles to follow-up visits  Carry your medicine list with you in case of an emergency  Manage GERD:       Do not have foods or drinks that may increase heartburn  These include chocolate, peppermint, fried or fatty foods, drinks that contain caffeine, or carbonated drinks (soda)  Other foods include spicy foods, onions, tomatoes, and tomato-based foods   Do not have foods or drinks that can irritate your esophagus, such as citrus fruits, juices, and alcohol  Do not eat large meals  When you eat a lot of food at one time, your stomach needs more acid to digest it  Eat 6 small meals each day instead of 3 large ones, and eat slowly  Do not eat meals 2 to 3 hours before bedtime  Elevate the head of your bed  Place 6-inch blocks under the head of your bed frame  You may also use more than one pillow under your head and shoulders while you sleep  Maintain a healthy weight  If you are overweight, weight loss may help relieve symptoms of GERD  Do not smoke  Smoking weakens the lower esophageal sphincter and increases the risk of GERD  Ask your healthcare provider for information if you currently smoke and need help to quit  E-cigarettes or smokeless tobacco still contain nicotine  Talk to your healthcare provider before you use these products  Do not put pressure on your abdomen  Pressure pushes acid up into your esophagus  Do not wear clothing that is tight around your waist  Do not bend over  Bend at the knees if you need to pick something up  Follow up with your doctor or gastroenterologist as directed:  Write down your questions so you remember to ask them during your visits  © Copyright [address] 2022 Information is for End User's use only and may not be sold, redistributed or otherwise used for commercial purposes  All illustrations and images included in CareNotes® are the copyrighted property of [employer]  or [employer]  The above information is an  only  It is not intended as medical advice for individual conditions or treatments  Talk to your doctor, nurse or pharmacist before following any medical regimen to see if it is safe and effective for you

## 2022-10-24 ENCOUNTER — OFFICE VISIT (OUTPATIENT)
Dept: PHYSICAL THERAPY | Facility: CLINIC | Age: 57
End: 2022-10-24
Payer: COMMERCIAL

## 2022-10-24 DIAGNOSIS — M76.61 ACHILLES TENDINITIS OF RIGHT LOWER EXTREMITY: ICD-10-CM

## 2022-10-24 DIAGNOSIS — M79.671 RIGHT FOOT PAIN: Primary | ICD-10-CM

## 2022-10-24 PROCEDURE — 97140 MANUAL THERAPY 1/> REGIONS: CPT

## 2022-10-24 PROCEDURE — 97110 THERAPEUTIC EXERCISES: CPT

## 2022-10-24 NOTE — PROGRESS NOTES
Daily Note     Today's date: 10/24/2022  Patient name: Dorie Bales  : 1965  MRN: [id number]  Referring provider: Rosa Richter DPM  Dx:   Encounter Diagnosis     ICD-10-CM    1  Right foot pain  M79 671    2  Achilles tendinitis of right lower extremity  M76 61        Start Time: 3777  Stop Time: 9577  Total time in clinic (min): 40 minutes    Subjective: Patient reports 50% reduction in insertional achilles pain since previous session and changing POC to emphasize strengthening/loading  She states pain is usually around a 2/10 and no longer increases significantly by end of work day  Objective: See treatment diary below      Assessment: Tolerated treatment well  Patient able to perform HR beyond neutral t/o today's session, notable improvement in impingement at achilles insertion  Able to increase load with seated HR and progress sets w/ unilateral eccentric bias to good tolerance  Lateral stepping performed with band at feet to facilitate intrinsic foot activation  Patient with fatigue post session  Patient will continue to benefit from skilled PT to improve functional mobility and activity tolerance  Plan: Continue per plan of care        Eval/ Re-eval Auth #/ Referral # Total visits Start date  Expiration date Total active units  Total manual units  PT only or  PT+OT?   22 NO AUTH 30 pcy                                                      Precautions:   Digestive  Irritable bowel syndrome    GERD (gastroesophageal reflux disease)       Endocrine  Impaired fasting glucose       Respiratory  Allergic rhinitis due to pollen       Cardiovascular and Mediastinum  Benign essential hypertension       Nervous and Auditory  Radiculopathy of lumbar region    Tarsal tunnel syndrome, right    Meralgia paresthetica of left side    Intervertebral disc disorder with radiculopathy of lumbosacral region       Musculoskeletal and Integument  Psoriasis       Genitourinary  Overactive bladder    Lichen sclerosus of female genitalia    Stage 3a chronic kidney disease       Other  Generalized anxiety disorder      Specialty Daily Treatment Diary     Insurance:         Visits: 4/30 5/30 6/30 7/30 8/30   Manual 10/4/22 10/6/22 10/17/22 10/20/22 10/24/22   PROM Ankle: all motions, hallux extension  performed      STM/MI of soleus, gastroc complex prn  Active cupping over gastroc, achilles insertion 10 min Gastin technique on medial achilles tendon insertion and MI of meidal soleus mid tibia    Graston to achilles tendon, 8 min Graston to achilles tendon, 8 min Graston to achilles tendon, 8 min   Joint mobs: metatarsals, TC joint  Performed meta: grade IV  TC joint: grade IV TC AP/PA gr II-III, 3x30    Subtalar inv/ev, gr II-III, 3x30 TC AP/PA gr II-III, 3x20    Subtalar inv/ev, gr II-III, 3x20    Ktape Navicular sling and  Space correction directly over achilles tendon  Navicular sling and  Space correction directly over achilles tendon Navicular sling and  Space correction directly over achilles tendon Navicular sling and  Space correction directly over achilles tendon                   Exercise Diary         PROTOCOL:         Ther ex:         Rec bike/nustep  7 min NS: lv 3       prostretch        Soleus stretch with half roll hold       Ankle Tband 4 way        Ankle Df stretch on step Forward step downs, 4" 2x10 6 in 2 x 10     Forward step downs, 4", 3x10           Bilateral heel raises with eccentric lowering on right 1st set - regular, x15  2nd set - weightshift for ecc lower on right, x15  3rd set - regular, x15 1st set - regular, x15  2nd set - weightshift for ecc lower on right, x15  3rd set - regular, x15 Standing HR (gastroc bias):  1st set - regular, x15  2nd set - weightshift for ecc lower on right, x15  3rd set - regular, x15    Sitting HR (soleus bias): with 25# weight on lap, 3x15    Unilateral HR on leg press: with block, 65#, 3x10 Standing HR (gastroc bias):  1st set - regular, x15  2nd set - weightshift for ecc lower on right, x15  3rd set - regular, x15    Sitting HR (soleus bias): with 25# weight on lap, 2x25    Unilateral HR on leg press: with block, 65#, 3x10 Standing HR (gastroc bias):  1st set - regular, x15  2nd set - weightshift for ecc lower on right, x15  3rd set - weightshift for ecc lower, x15    Sitting HR (soleus bias): with 50# weight on lap, 3x15    Unilateral HR on leg press: with block, 75#, 3x15   sidelying hip abd 3x10 each -   Side stepping w/ light loop at feet, 10 feet, 3 laps   Side lying clamshells 3x15 each -      Standing hip abd heel against wall 2x10 each 2 x 10  Each       Hip burners                         Neuro Gary:        glute set with bridge progression        Hip burners        SLS advancing to 3 way hip        Rockerboard:  DF/PF  INV/EV                Ther Activity:        Reevaluation           Pt edu, PT POC, HEP 10 min     Gait Training:                 HEP:                Modalities:        MH        Ice        Total time:

## 2022-10-25 ENCOUNTER — OFFICE VISIT (OUTPATIENT)
Dept: PODIATRY | Facility: CLINIC | Age: 57
End: 2022-10-25
Payer: COMMERCIAL

## 2022-10-25 VITALS
DIASTOLIC BLOOD PRESSURE: 80 MMHG | SYSTOLIC BLOOD PRESSURE: 130 MMHG | BODY MASS INDEX: 33.27 KG/M2 | HEIGHT: 66 IN | RESPIRATION RATE: 17 BRPM | WEIGHT: 207 LBS

## 2022-10-25 DIAGNOSIS — S92.034A CLOSED NONDISPLACED AVULSION FRACTURE OF TUBEROSITY OF RIGHT CALCANEUS, INITIAL ENCOUNTER: ICD-10-CM

## 2022-10-25 DIAGNOSIS — M76.61 ACHILLES TENDINITIS OF RIGHT LOWER EXTREMITY: Primary | ICD-10-CM

## 2022-10-25 DIAGNOSIS — M79.671 RIGHT FOOT PAIN: ICD-10-CM

## 2022-10-25 PROCEDURE — L4361 PNEUMA/VAC WALK BOOT PRE OTS: HCPCS | Performed by: PODIATRIST

## 2022-10-25 PROCEDURE — 20551 NJX 1 TENDON ORIGIN/INSJ: CPT | Performed by: PODIATRIST

## 2022-10-25 RX ORDER — MELOXICAM 15 MG/1
15 TABLET ORAL DAILY
Qty: 30 TABLET | Refills: 0 | Status: SHIPPED | OUTPATIENT
Start: 2022-10-25 | End: 2022-11-24

## 2022-10-25 NOTE — PROGRESS NOTES
Assessment/Plan:  Chronic Achilles tendinitis  Calcific tendinitis  Rule out calcaneal spur fracture  Plan  Chart reviewed  Patient examined  Patient advised on condition  At this time patient will continue therapy  We will change to Mobic  Today 1 cc dexamethasone phosphate injected into right Achilles tendon insertion  In addition she will use Voltaren Gel  In ordered to help with pain and treat possible spur injury, patient has been immobilized  Aircast applied  Patient advised on after use  Patient may need surgical intervention       Diagnoses and all orders for this visit:    Achilles tendinitis of right lower extremity  -     meloxicam (MOBIC) 15 mg tablet; Take 1 tablet (15 mg total) by mouth daily    Right foot pain  -     meloxicam (MOBIC) 15 mg tablet; Take 1 tablet (15 mg total) by mouth daily    Closed nondisplaced avulsion fracture of tuberosity of right calcaneus, initial encounter          Subjective:  Patient has continued pain  She has pain on a 3/10 scale  She is undergoing therapy  She is taking medicine  Allergies   Allergen Reactions   • Bee Venom Anaphylaxis     Reaction Date: [id number];   Reaction Date: 23Oct2006;    • Meperidine GI Intolerance     DEMEROL vomiting  DEMEROL vomiting   • Penicillins      Reaction Date: 25Jul2005;  Annotation - [id number]: as infant - throat closes, difficult breathing         Current Outpatient Medications:   •  meloxicam (MOBIC) 15 mg tablet, Take 1 tablet (15 mg total) by mouth daily, Disp: 30 tablet, Rfl: 0  •  cholecalciferol (VITAMIN D3) 1,000 units tablet, Take 1 tablet (1,000 Units total) by mouth daily, Disp: 30 tablet, Rfl: 0  •  clobetasol (TEMOVATE) 0 05 % cream, Apply topically 2 (two) times a day as needed, Disp: , Rfl:   •  clonazePAM (KlonoPIN) 0 5 mg tablet, take 1 tablet by mouth three times a day if needed for anxiety, Disp: 90 tablet, Rfl: 0  •  diclofenac sodium (VOLTAREN) 1 %, Place on the skin, Disp: , Rfl:   • docusate sodium (COLACE) 100 mg capsule, Take 1 capsule (100 mg total) by mouth 2 (two) times a day as needed for constipation, Disp: 60 capsule, Rfl: 0  •  Misc Natural Products (ELDERBERRY ZINC/VIT C/IMMUNE MT), Apply to the mouth or throat, Disp: , Rfl:   •  Omega-3 Fatty Acids (fish oil) 1,000 mg, Take 1,000 mg by mouth daily, Disp: , Rfl:   •  polyethylene glycol (MIRALAX) 17 g packet, Take 17 g by mouth daily (Patient taking differently: Take 17 g by mouth if needed), Disp: 14 each, Rfl: 0  •  RESTASIS 0 05 % ophthalmic emulsion, Administer to both eyes every 12 (twelve) hours  , Disp: , Rfl: 0  •  sertraline (ZOLOFT) 100 mg tablet, Take 1 tablet (100 mg total) by mouth daily, Disp: 90 tablet, Rfl: 1  •  sulindac (CLINORIL) 200 MG tablet, Take 1 tablet (200 mg total) by mouth 2 (two) times a day for 20 days, Disp: 40 tablet, Rfl: 0  •  verapamil (VERELAN PM) 120 MG 24 hr capsule, Take 1 capsule (120 mg total) by mouth daily, Disp: 90 capsule, Rfl: 1    Patient Active Problem List   Diagnosis   • Radiculopathy of lumbar region   • Tarsal tunnel syndrome, right   • Psoriasis   • Overactive bladder   • Lichen sclerosus of female genitalia   • Irritable bowel syndrome   • Impaired fasting glucose   • Allergic rhinitis due to pollen   • Generalized anxiety disorder   • Benign essential hypertension   • Meralgia paresthetica of left side   • Intervertebral disc disorder with radiculopathy of lumbosacral region   • Stage 3a chronic kidney disease   • GERD (gastroesophageal reflux disease)          Patient ID: Pramod Benites is a 62 y o  female      HPI    The following portions of the patient's history were reviewed and updated as appropriate:     family history includes Anxiety disorder in her mother; Arthritis in her father and mother; Atrial fibrillation in her father; Breast cancer (age of onset: 79) in her maternal grandmother; Diabetes in her mother; Heart disease in her mother; No Known Problems in her brother and maternal aunt; Obesity in her sister  reports that she has never smoked  She has never used smokeless tobacco  She reports current alcohol use of about 2 0 standard drinks of alcohol per week  She reports that she does not use drugs  Vitals:    10/25/22 1629   BP: 130/80   Resp: 17       Review of Systems      Objective:  Patient's shoes and socks removed  Foot ExamPhysical Exam       Patient's shoes and socks removed  Foot ExamPhysical Exam          Foot Exam     General  General Appearance: appears stated age and healthy   Orientation: alert and oriented to person, place, and time   Affect: appropriate   Gait: antalgic         Right Foot/Ankle      Inspection and Palpation  Ecchymosis: none  Tenderness: bony tenderness   Swelling: dorsum   Arch: pes planus  Hammertoes: fifth toe  Hallux valgus: no  Hallux limitus: yes     Neurovascular  Dorsalis pedis: 3+  Posterior tibial: 3+  Saphenous nerve sensation: normal  Tibial nerve sensation: normal  Superficial peroneal nerve sensation: normal  Deep peroneal nerve sensation: normal  Sural nerve sensation: normal        Left Foot/Ankle       Inspection and Palpation  Ecchymosis: none  Swelling: dorsum   Arch: pes planus  Hammertoes: fifth toe  Hallux valgus: no  Hallux limitus: yes     Neurovascular  Dorsalis pedis: 3+  Posterior tibial: 3+  Saphenous nerve sensation: normal  Tibial nerve sensation: normal  Superficial peroneal nerve sensation: normal  Deep peroneal nerve sensation: normal  Sural nerve sensation: normal           Physical Exam  Vitals and nursing note reviewed  Constitutional:       Appearance: Normal appearance  Cardiovascular:      Rate and Rhythm: Normal rate and regular rhythm       Pulses:           Dorsalis pedis pulses are 3+ on the right side and 3+ on the left side         Posterior tibial pulses are 3+ on the right side and 3+ on the left side  Musculoskeletal:      Right foot: Bony tenderness present   No bunion   Pain with palpation plantar fascia insertion right foot     Left foot: No bunion       Comments:   Patient is pronated in stance and gait   She has pain with palpation right Achilles tendon insertion   Retrocalcaneal spur palpated

## 2022-10-27 ENCOUNTER — HOSPITAL ENCOUNTER (OUTPATIENT)
Dept: RADIOLOGY | Facility: HOSPITAL | Age: 57
Discharge: HOME | End: 2022-10-27
Attending: FAMILY MEDICINE
Payer: COMMERCIAL

## 2022-10-27 DIAGNOSIS — R10.13 EPIGASTRIC ABDOMINAL PAIN: ICD-10-CM

## 2022-10-27 PROCEDURE — 76705 ECHO EXAM OF ABDOMEN: CPT

## 2022-11-01 ENCOUNTER — OFFICE VISIT (OUTPATIENT)
Dept: PHYSICAL THERAPY | Facility: CLINIC | Age: 57
End: 2022-11-01

## 2022-11-01 DIAGNOSIS — M79.671 RIGHT FOOT PAIN: Primary | ICD-10-CM

## 2022-11-01 DIAGNOSIS — M76.61 ACHILLES TENDINITIS OF RIGHT LOWER EXTREMITY: ICD-10-CM

## 2022-11-01 NOTE — LETTER
2022    Nini Clark, [address] [place] [address]    Patient: Pramod Benites   YOB: 1965   Date of Visit: 2022     Encounter Diagnosis     ICD-10-CM    1  Right foot pain  M79 671    2  Achilles tendinitis of right lower extremity  M76 61        Dear Dr Cassie Regalado: Thank you for your recent referral of Pramod [facility]  Please review the attached evaluation summary from Mike's recent visit  Please verify that you agree with the plan of care by signing the attached order  If you have any questions or concerns, please do not hesitate to call  I sincerely appreciate the opportunity to share in the care of one of your patients and hope to have another opportunity to work with you in the near future  Sincerely,    Christine Cuevas PT      Referring Provider:      I certify that I have read the below Plan of Care and certify the need for these services furnished under this plan of treatment while under my care  Nini Clark DPM  [address] [place] [id number]  Via Fax: [phone]          Daily Note/Progress Note      Today's date: 2022  Patient name: Pramod Benites  : 1965  MRN: [id number]  Referring provider: Joan Arreguin DPM  Dx:   Encounter Diagnosis     ICD-10-CM    1  Right foot pain  M79 671    2  Achilles tendinitis of right lower extremity  M76 61        Subjective: Pt reports % improvement since SOC: 30%  The last 70% is due to pain still exists with her normal activity  States overall her activitiy level is reduced  "Im not doing anything except PT "  Pain level at rest:  0-1 /10, pain level with ADLS:  5 /70 without the boot, pain level at worst: 7/10 which occurs without boot and increase ambulation   At this time, the functional limitations include: prolong ambulation without the boot   she saw dr Willem Breaux last week and he placed her in a boot for all weightbearing activities    Patient is painfree in the boot  She is to wear the boot x 1 month  States podiatrist has officially diagnosed her with achilles insertional calcific tendopathy  Gave her injections in addition to boot and switched medications: but overall has seen no change since then  Objective: See treatment diary below    Goals  STG:  + Patient will report a 40% improvement in symptoms (2-3 weeks) not met  + Patient will be independent in basic HEP (2-3 weeks) met   + Patient will demonstrate reduced gait deviations ambulating on level surfaces   (2-3 week) not met consistently  + Patient will demonstrate an increase in flexibility right calf musculautre by one grade (2-3 weeks) not met   + Patient will increase SLS time by 10  seconds (2-3 weeks) not met   + Patient will report increased ease walking due to a reduction in pain (2-3 weeks) not met consistently      LTG:  + Patient will have pain level 0/10 right ankle  with ADL's (4-6 weeks) not met   + Patient will report a 70% improvement in symptoms (4-6 weeks) not met   + Patient will increase FOTO score by 10 points  (10 sessions) not met   + Patient will be independent in comprehensive HEP (4-6 weeks) still progressing  + Patient will demonstrate no gait deviations ambulating on level surfaces   (4-6 week)not met   + Patient will negotiate stairs reciprically with use of one railing and painfree  (4-6 weeks)not met   + Patient will demonstrate increase  MMT of left hip to  4/5 for maximum function  (4-6 weeks) met   + Patient will report no functional limitations   (4-6 weeks) not met     Plan  Plan details:  2-3 x week, 4-6 weeks: HEP development, stretching LE musculature per objective findings, strengthening LE musculature per objective findings, A/AA/PROM ankle motions, joint mobilizations prn, posture education especially foot posture prn, STM/MI as needed to reduce muscle tension as per objective findings, muscle reeducation prn, gait/balance training, stability training, Leandra/Erik prn PLOC discussed and agreed upon with patient      Patient would benefit from: skilled physical therapy  Frequency: 2x week  Plan of Care beginning date: 9/21/2022  Plan of Care expiration date: 11/2/2022  Treatment plan discussed with: patient    Objective (11/1/22)    Static Posture   History of degenerative disc disease: experiences minimal back pain but no history of sciatica since pregnancy (34 years ago)    Posture:reduced arch support ad     Gait: no assistive device, reduced roll over and push off on right LE  ((status quo)    Elevations: climbing: reciprocally, descending step to per patient report (step to with increase in pain)    Functional activities:  SLS: Right 30 sec + pain     Left: 13:69 sec needs cuing to facilitate core during SLS (Right: 15 sec, left 23 sec)  Squat: wnl with no cuing + pain right achilles tendon    Palpation: no TTP in area of right PF, + TTP medial aspect of right soleus, and along achillies tendon distal aspect rigth  (+ TTP in area of achilles tendon insertion)    ROM:  Ankle Dorsiflexion: Right: wnl  + pain Left: wnl (status quo)   Ankle Plantarflexion: Right: wnl + pain  Left: wnl ( wnl no pain )   Ankle Inversion: Right: wnl + pain Left: wnl ( wnl no pain )  Ankle Eversion: Right: wnl + pain Left: wnl ( wnl no pain)  Calcaneal rocking: Right: wnl    Left: wnl   First MTP flexion: Right: wnl   Left: wnl   First MTP extension: Right: fair    Left: wnl ( wnl)    Joint mobility:   Talocrural: Right: fair   Left: fair ( left wnl)  Metatarsal: Right: fair    Left: fair (left wnl)      MMT:  Ankle Dorsiflexion: Right: 4 /5 + pain left: 4/5 (4/5 no pain)  Ankle Plantarflexion: Right: 4 /5 + pain Left: 4/5 (4/5 no pain)  Ankle Inversion: Right: 4/5  + pain Left: 4/5 (4/5 no pain)  Ankle Eversion: Right: 4 /5 + pain Left: 4/5 (4/5 no pain)  able to walk on heels and toes + pain on right      Hip abd: left 3+/5  Right: 4/5 ( left 4/5 )  Clamshells: left 4-/5  Right: 4/5 (left 4/5)    Flexibility:  Gastroc/soleus: Right:  Fair + pain   Left fair no pain (Right: fair)  Hamstrings: Right:  Good  Left good             Assessment: Heber Luna demonstrates minimal change in symptoms per patient report  Increase in pain was occurring with self stretching therefore change in treatment plan with focus on loading tension and increasing TC joint mobility: patient reported a mild reduction in pain after few sessions  Patient saw podiatrist last week who then placed patient in a boot during weightbearing  Discussed PLOC of care with patient: patient is currently painfree ambulating in boot  Will continue PT 2-3 sessions with loading tendon  If pain does not reduce with then hold PT until patient is removed form boot   Patient symtpoms consistent with tendonopathy  The goal status of Wendy Chua is indicated above above  Plan: continue 2 x week 4 weeks unless symptoms remain unchanged or increase    goal of PT is to load tendon: 11/1/22--> 12/15/22        Eval/ Re-eval Auth #/ Referral # Total visits Start date  Expiration date Total active units  Total manual units  PT only or  PT+OT?   9/21/22 NO AUTH 30 pcy                                                      Precautions:   Digestive  Irritable bowel syndrome    GERD (gastroesophageal reflux disease)       Endocrine  Impaired fasting glucose       Respiratory  Allergic rhinitis due to pollen       Cardiovascular and Mediastinum  Benign essential hypertension       Nervous and Auditory  Radiculopathy of lumbar region    Tarsal tunnel syndrome, right    Meralgia paresthetica of left side    Intervertebral disc disorder with radiculopathy of lumbosacral region       Musculoskeletal and Integument  Psoriasis       Genitourinary  Overactive bladder    Lichen sclerosus of female genitalia    Stage 3a chronic kidney disease       Other  Generalized anxiety disorder      Specialty Daily Treatment Diary     Insurance:      Auto I.D. performed   Visits: 5/30 6/30 7/30 8/30 9/30   Manual 10/6/22 10/17/22 10/20/22 10/24/22 11/1/22   PROM Ankle: all motions, hallux extension performed    --   STM/MI of soleus, gastroc complex prn  Gastin technique on medial achilles tendon insertion and MI of meidal soleus mid tibia    Graston to achilles tendon, 8 min Graston to achilles tendon, 8 min Graston to achilles tendon, 8 min Graston to achilles tendon, 8 min avoiding blisters on heel believed to be from boot   Joint mobs: metatarsals, TC joint Performed meta: grade IV  TC joint: grade IV TC AP/PA gr II-III, 3x30    Subtalar inv/ev, gr II-III, 3x30 TC AP/PA gr II-III, 3x20    Subtalar inv/ev, gr II-III, 3x20     Ktape  Navicular sling and  Space correction directly over achilles tendon Navicular sling and  Space correction directly over achilles tendon Navicular sling and  Space correction directly over achilles tendon --                   Exercise Diary         PROTOCOL:         Ther ex:         Rec bike/nustep 7 min NS: lv 3        prostretch        Soleus stretch with half roll        Ankle Tband 4 way     Ankle DF with TC joint mob with band 5 sec x 10     Ankle Df stretch on step 6 in 2 x 10     Forward step downs, 4", 3x10            Bilateral heel raises with eccentric lowering on right 1st set - regular, x15  2nd set - weightshift for ecc lower on right, x15  3rd set - regular, x15 Standing HR (gastroc bias):  1st set - regular, x15  2nd set - weightshift for ecc lower on right, x15  3rd set - regular, x15    Sitting HR (soleus bias): with 25# weight on lap, 3x15    Unilateral HR on leg press: with block, 65#, 3x10 Standing HR (gastroc bias):  1st set - regular, x15  2nd set - weightshift for ecc lower on right, x15  3rd set - regular, x15    Sitting HR (soleus bias): with 25# weight on lap, 2x25    Unilateral HR on leg press: with block, 65#, 3x10 Standing HR (gastroc bias):  1st set - regular, x15  2nd set - weightshift for ecc lower on right, x15  3rd set - weightshift for ecc lower, x15    Sitting HR (soleus bias): with 50# weight on lap, 3x15    Unilateral HR on leg press: with block, 75#, 3x15 Standing HR (gastroc bias):  1st set - regular, x15  2nd set - weightshift for ecc lower on right, x15  3rd set - weightshift for ecc lower, x15    Sitting HR (soleus bias): with 50# weight on lap, 3x15    Unilateral HR on leg press: with block, 75#, 3x15   sidelying hip abd -   Side stepping w/ light loop at feet, 10 feet, 3 laps    Side lying clamshells -       Standing hip abd heel against wall 2 x 10  Each        Hip burners                         Neuro Gary:        glute set with bridge progression        Hip burners        SLS advancing to 3 way hip        Rockerboard:  DF/PF  INV/EV                Ther Activity:        Reevaluation     performed     Pt edu, PT POC, HEP 10 min      Gait Training:                 HEP:                Modalities:        MH        Ice        Total time:           Access Code: [email or web address]  URL: [email or web address]  Date: 11/01/2022  Prepared by:  Tereso Mcgrath    Exercises  · Standing Ankle Plantarflexion Self-Mobilization - 1 x daily - 7 x weekly - 1 sets - 10 reps - 5 sec hold

## 2022-11-01 NOTE — PROGRESS NOTES
Daily Note/Progress Note      Today's date: 2022  Patient name: Rene Hill  : 1965  MRN: [id number]  Referring provider: Lilibeth May DPM  Dx:   Encounter Diagnosis     ICD-10-CM    1  Right foot pain  M79 671    2  Achilles tendinitis of right lower extremity  M76 61        Subjective: Pt reports % improvement since SOC: 30%  The last 70% is due to pain still exists with her normal activity  States overall her activitiy level is reduced  "Im not doing anything except PT "  Pain level at rest:  0-1 /10, pain level with ADLS:   without the boot, pain level at worst: /10 which occurs without boot and increase ambulation   At this time, the functional limitations include: prolong ambulation without the boot   she saw dr John Bhatt last week and he placed her in a boot for all weightbearing activities  Patient is painfree in the boot  She is to wear the boot x 1 month  States podiatrist has officially diagnosed her with achilles insertional calcific tendopathy  Gave her injections in addition to boot and switched medications: but overall has seen no change since then        Objective: See treatment diary below    Goals  STG:  + Patient will report a 40% improvement in symptoms (2-3 weeks) not met  + Patient will be independent in basic HEP (2-3 weeks) met   + Patient will demonstrate reduced gait deviations ambulating on level surfaces   (2-3 week) not met consistently  + Patient will demonstrate an increase in flexibility right calf musculautre by one grade (2-3 weeks) not met   + Patient will increase SLS time by 10  seconds (2-3 weeks) not met   + Patient will report increased ease walking due to a reduction in pain (2-3 weeks) not met consistently      LTG:  + Patient will have pain level 0/10 right ankle  with ADL's (4-6 weeks) not met   + Patient will report a 70% improvement in symptoms (4-6 weeks) not met   + Patient will increase FOTO score by 10 points  (10 sessions) not met   + Patient will be independent in comprehensive HEP (4-6 weeks) still progressing  + Patient will demonstrate no gait deviations ambulating on level surfaces   (4-6 week)not met   + Patient will negotiate stairs reciprically with use of one railing and painfree  (4-6 weeks)not met   + Patient will demonstrate increase  MMT of left hip to  4/5 for maximum function  (4-6 weeks) met   + Patient will report no functional limitations  (4-6 weeks) not met     Plan  Plan details:  2-3 x week, 4-6 weeks: HEP development, stretching LE musculature per objective findings, strengthening LE musculature per objective findings, A/AA/PROM ankle motions, joint mobilizations prn, posture education especially foot posture prn, STM/MI as needed to reduce muscle tension as per objective findings, muscle reeducation prn, gait/balance training, stability training, Mobley/Ktape prn PLOC discussed and agreed upon with patient      Patient would benefit from: skilled physical therapy  Frequency: 2x week  Plan of Care beginning date: 9/21/2022  Plan of Care expiration date: 11/2/2022  Treatment plan discussed with: patient    Objective (11/1/22)    Static Posture   History of degenerative disc disease: experiences minimal back pain but no history of sciatica since pregnancy (34 years ago)    Posture:reduced arch support ad     Gait: no assistive device, reduced roll over and push off on right LE  ((status quo)    Elevations: climbing: reciprocally, descending step to per patient report (step to with increase in pain)    Functional activities:  SLS: Right 30 sec + pain     Left: 13:69 sec needs cuing to facilitate core during SLS (Right: 15 sec, left 23 sec)  Squat: wnl with no cuing + pain right achilles tendon    Palpation: no TTP in area of right PF, + TTP medial aspect of right soleus, and along achillies tendon distal aspect rigth  (+ TTP in area of achilles tendon insertion)    ROM:  Ankle Dorsiflexion: Right: wnl  + pain Left: wnl (status quo)   Ankle Plantarflexion: Right: wnl + pain  Left: wnl ( wnl no pain )   Ankle Inversion: Right: wnl + pain Left: wnl ( wnl no pain )  Ankle Eversion: Right: wnl + pain Left: wnl ( wnl no pain)  Calcaneal rocking: Right: wnl    Left: wnl   First MTP flexion: Right: wnl   Left: wnl   First MTP extension: Right: fair    Left: wnl ( wnl)    Joint mobility:   Talocrural: Right: fair   Left: fair ( left wnl)  Metatarsal: Right: fair    Left: fair (left wnl)      MMT:  Ankle Dorsiflexion: Right: 4 /5 + pain left: 4/5 (4/5 no pain)  Ankle Plantarflexion: Right: 4 /5 + pain Left: 4/5 (4/5 no pain)  Ankle Inversion: Right: 4/5  + pain Left: 4/5 (4/5 no pain)  Ankle Eversion: Right: 4 /5 + pain Left: 4/5 (4/5 no pain)  able to walk on heels and toes + pain on right      Hip abd: left 3+/5  Right: 4/5 ( left 4/5 )  Clamshells: left 4-/5  Right: 4/5 (left 4/5)    Flexibility:  Gastroc/soleus: Right:  Fair + pain   Left fair no pain (Right: fair)  Hamstrings: Right:  Good  Left good             Assessment: Bon Luna demonstrates minimal change in symptoms per patient report  Increase in pain was occurring with self stretching therefore change in treatment plan with focus on loading tension and increasing TC joint mobility: patient reported a mild reduction in pain after few sessions  Patient saw podiatrist last week who then placed patient in a boot during weightbearing  Discussed PLOC of care with patient: patient is currently painfree ambulating in boot  Will continue PT 2-3 sessions with loading tendon  If pain does not reduce with then hold PT until patient is removed form boot   Patient symtpoms consistent with tendonopathy  The goal status of Dorie Bales is indicated above above  Plan: continue 2 x week 4 weeks unless symptoms remain unchanged or increase    goal of PT is to load tendon: 11/1/22--> 12/15/22        Eval/ Re-eval Auth #/ Referral # Total visits Start date  Expiration date Total active units  Total manual units  PT only or  PT+OT?   9/21/22 NO AUTH 30 pcy                                                      Precautions:   Digestive  Irritable bowel syndrome    GERD (gastroesophageal reflux disease)       Endocrine  Impaired fasting glucose       Respiratory  Allergic rhinitis due to pollen       Cardiovascular and Mediastinum  Benign essential hypertension       Nervous and Auditory  Radiculopathy of lumbar region    Tarsal tunnel syndrome, right    Meralgia paresthetica of left side    Intervertebral disc disorder with radiculopathy of lumbosacral region       Musculoskeletal and Integument  Psoriasis       Genitourinary  Overactive bladder    Lichen sclerosus of female genitalia    Stage 3a chronic kidney disease       Other  Generalized anxiety disorder      Specialty Daily Treatment Diary     Insurance:      foto performed   Visits: 5/30 6/30 7/30 8/30 9/30   Manual 10/6/22 10/17/22 10/20/22 10/24/22 11/1/22   PROM Ankle: all motions, hallux extension performed    --   STM/MI of soleus, gastroc complex prn  Gastin technique on medial achilles tendon insertion and MI of meidal soleus mid tibia    Graston to achilles tendon, 8 min Graston to achilles tendon, 8 min Graston to achilles tendon, 8 min Graston to achilles tendon, 8 min avoiding blisters on heel believed to be from boot   Joint mobs: metatarsals, TC joint Performed meta: grade IV  TC joint: grade IV TC AP/PA gr II-III, 3x30    Subtalar inv/ev, gr II-III, 3x30 TC AP/PA gr II-III, 3x20    Subtalar inv/ev, gr II-III, 3x20     Ktape  Navicular sling and  Space correction directly over achilles tendon Navicular sling and  Space correction directly over achilles tendon Navicular sling and  Space correction directly over achilles tendon --                   Exercise Diary         PROTOCOL:         Ther ex:         Rec bike/nustep 7 min NS: lv 3        prostretch        Soleus stretch with half roll        Ankle Tband 4 way     Ankle DF with TC joint mob with band 5 sec x 10     Ankle Df stretch on step 6 in 2 x 10     Forward step downs, 4", 3x10            Bilateral heel raises with eccentric lowering on right 1st set - regular, x15  2nd set - weightshift for ecc lower on right, x15  3rd set - regular, x15 Standing HR (gastroc bias):  1st set - regular, x15  2nd set - weightshift for ecc lower on right, x15  3rd set - regular, x15    Sitting HR (soleus bias): with 25# weight on lap, 3x15    Unilateral HR on leg press: with block, 65#, 3x10 Standing HR (gastroc bias):  1st set - regular, x15  2nd set - weightshift for ecc lower on right, x15  3rd set - regular, x15    Sitting HR (soleus bias): with 25# weight on lap, 2x25    Unilateral HR on leg press: with block, 65#, 3x10 Standing HR (gastroc bias):  1st set - regular, x15  2nd set - weightshift for ecc lower on right, x15  3rd set - weightshift for ecc lower, x15    Sitting HR (soleus bias): with 50# weight on lap, 3x15    Unilateral HR on leg press: with block, 75#, 3x15 Standing HR (gastroc bias):  1st set - regular, x15  2nd set - weightshift for ecc lower on right, x15  3rd set - weightshift for ecc lower, x15    Sitting HR (soleus bias): with 50# weight on lap, 3x15    Unilateral HR on leg press: with block, 75#, 3x15   sidelying hip abd -   Side stepping w/ light loop at feet, 10 feet, 3 laps    Side lying clamshells -       Standing hip abd heel against wall 2 x 10  Each        Hip burners                         Neuro Gary:        glute set with bridge progression        Hip burners        SLS advancing to 3 way hip        Rockerboard:  DF/PF  INV/EV                Ther Activity:        Reevaluation     performed     Pt edu, PT POC, HEP 10 min      Gait Training:                 HEP:                Modalities:        MH        Ice        Total time:           Access Code: [email or web address]  URL: [email or web address]  Date: 11/01/2022  Prepared by:  Alma Hernández Siddharth    Exercises  · Standing Ankle Plantarflexion Self-Mobilization - 1 x daily - 7 x weekly - 1 sets - 10 reps - 5 sec hold

## 2022-11-03 ENCOUNTER — APPOINTMENT (OUTPATIENT)
Dept: PHYSICAL THERAPY | Facility: CLINIC | Age: 57
End: 2022-11-03

## 2022-11-08 ENCOUNTER — OFFICE VISIT (OUTPATIENT)
Dept: PHYSICAL THERAPY | Facility: CLINIC | Age: 57
End: 2022-11-08

## 2022-11-08 DIAGNOSIS — M76.61 ACHILLES TENDINITIS OF RIGHT LOWER EXTREMITY: ICD-10-CM

## 2022-11-08 DIAGNOSIS — M79.671 RIGHT FOOT PAIN: Primary | ICD-10-CM

## 2022-11-08 NOTE — PROGRESS NOTES
Daily Note         Today's date: 2022  Patient name: Gi Caro  : 1965  MRN: [id number]  Referring provider: James Cueva DPM  Dx:   Encounter Diagnosis     ICD-10-CM    1  Right foot pain  M79 671    2  Achilles tendinitis of right lower extremity  M76 61        Subjective: Jose Luna reports pain level 0/10 with boot, without the boot: 1/10       Objective: See treatment diary below    Assessment:  patient denied any signficant pain after session today  advanced program to include eccentric lowering on right without left LE support  she was able to complete with minimal to no difficulty  patient Wily Post discussed the due to her limited number of visits per calendar year, that she would conitnue performing her HEP while she is in the boot  at this time her symptoms are minimal   she will contact clinic when she is ready to transition out of boot and resume PT if needed    Patient vocalized understanding and was in agree ment  Plan: hold PT unitl she can prgoress out of the boot           Eval/ Re-eval Auth #/ Referral # Total visits Start date  Expiration date Total active units  Total manual units  PT only or  PT+OT?   22 NO AUTH 30 pcy                                                      Precautions:   Digestive  Irritable bowel syndrome    GERD (gastroesophageal reflux disease)       Endocrine  Impaired fasting glucose       Respiratory  Allergic rhinitis due to pollen       Cardiovascular and Mediastinum  Benign essential hypertension       Nervous and Auditory  Radiculopathy of lumbar region    Tarsal tunnel syndrome, right    Meralgia paresthetica of left side    Intervertebral disc disorder with radiculopathy of lumbosacral region       Musculoskeletal and Integument  Psoriasis       Genitourinary  Overactive bladder    Lichen sclerosus of female genitalia    Stage 3a chronic kidney disease       Other  Generalized anxiety disorder      Specialty Daily Treatment Diary     Insurance:     foto performed    Visits: 6/30 7/30 8/30 9/30 10/30   Manual 10/17/22 10/20/22 10/24/22 11/1/22 11/8/22   PROM Ankle: all motions, hallux extension    --    STM/MI of soleus, gastroc complex prn  Graston to achilles tendon, 8 min Graston to achilles tendon, 8 min Graston to achilles tendon, 8 min Graston to achilles tendon, 8 min avoiding blisters on heel believed to be from boot Graston to achilles tendon, 8 min avoiding blisters on heel believed to be from boot   Joint mobs: metatarsals, TC joint TC AP/PA gr II-III, 3x30    Subtalar inv/ev, gr II-III, 3x30 TC AP/PA gr II-III, 3x20    Subtalar inv/ev, gr II-III, 3x20   TC AP/PA gr III-IV, 3x20  V- 1 x   Ktape Navicular sling and  Space correction directly over achilles tendon Navicular sling and  Space correction directly over achilles tendon Navicular sling and  Space correction directly over achilles tendon --                    Exercise Diary         PROTOCOL:         Ther ex:         Rec bike/nustep        prostretch        Soleus stretch with half roll        Ankle Tband 4 way    Ankle DF with TC joint mob with band 5 sec x 10      Ankle Df stretch on step   Forward step downs, 4", 3x10             Bilateral heel raises with eccentric lowering on right Standing HR (gastroc bias):  1st set - regular, x15  2nd set - weightshift for ecc lower on right, x15  3rd set - regular, x15    Sitting HR (soleus bias): with 25# weight on lap, 3x15    Unilateral HR on leg press: with block, 65#, 3x10 Standing HR (gastroc bias):  1st set - regular, x15  2nd set - weightshift for ecc lower on right, x15  3rd set - regular, x15    Sitting HR (soleus bias): with 25# weight on lap, 2x25    Unilateral HR on leg press: with block, 65#, 3x10 Standing HR (gastroc bias):  1st set - regular, x15  2nd set - weightshift for ecc lower on right, x15  3rd set - weightshift for ecc lower, x15    Sitting HR (soleus bias): with 50# weight on lap, 3x15    Unilateral HR on leg press: with block, 75#, 3x15 Standing HR (gastroc bias):  1st set - regular, x15  2nd set - weightshift for ecc lower on right, x15  3rd set - weightshift for ecc lower, x15    Sitting HR (soleus bias): with 50# weight on lap, 3x15    Unilateral HR on leg press: with block, 75#, 3x15 Standing HR (gastroc bias):  1st set - regular, x15 x 2 rounds   2nd set - weightshift for ecc lower on right, x15 x 2 rounds   3rd set -  ecc lower right only , x15 x 2 rounds     Sitting HR (soleus bias): with 50# weight on lap, 3x15    Unilateral HR on leg press: with block, 75#, 3x15   sidelying hip abd   Side stepping w/ light loop at feet, 10 feet, 3 laps     Side lying clamshells        Standing hip abd heel against wall        Hip burners                         Neuro Gary:        glute set with bridge progression        Hip burners        SLS advancing to 3 way hip        Rockerboard:  DF/PF  INV/EV                Ther Activity:        Reevaluation    performed     Pt edu, PT POC, HEP 10 min       Gait Training:                 HEP:                Modalities:        MH        Ice        Total time:           Access Code: [email or web address]  URL: [email or web address]  Date: 11/01/2022  Prepared by:  Kendra Tucker    Exercises  · Standing Ankle Plantarflexion Self-Mobilization - 1 x daily - 7 x weekly - 1 sets - 10 reps - 5 sec hold

## 2022-11-10 ENCOUNTER — APPOINTMENT (OUTPATIENT)
Dept: PHYSICAL THERAPY | Facility: CLINIC | Age: 57
End: 2022-11-10

## 2022-11-15 ENCOUNTER — OFFICE VISIT (OUTPATIENT)
Dept: OBGYN CLINIC | Facility: CLINIC | Age: 57
End: 2022-11-15

## 2022-11-15 ENCOUNTER — APPOINTMENT (OUTPATIENT)
Dept: RADIOLOGY | Facility: AMBULARY SURGERY CENTER | Age: 57
End: 2022-11-15
Attending: ORTHOPAEDIC SURGERY

## 2022-11-15 VITALS — HEIGHT: 66 IN | WEIGHT: 207 LBS | BODY MASS INDEX: 33.27 KG/M2

## 2022-11-15 DIAGNOSIS — M76.60 INSERTIONAL ACHILLES TENDINOPATHY: Primary | ICD-10-CM

## 2022-11-15 DIAGNOSIS — M77.51 RETROCALCANEAL BURSITIS (BACK OF HEEL), RIGHT: ICD-10-CM

## 2022-11-15 DIAGNOSIS — S92.034A CLOSED NONDISPLACED AVULSION FRACTURE OF TUBEROSITY OF RIGHT CALCANEUS, INITIAL ENCOUNTER: ICD-10-CM

## 2022-11-15 DIAGNOSIS — M92.61 HAGLUND'S DEFORMITY OF RIGHT HEEL: ICD-10-CM

## 2022-11-15 NOTE — PROGRESS NOTES
SIDDHARTHA Garcia  Attending, Orthopaedic Surgery  [facility] [address] [facility] [address] [facility]        ORTHOPAEDIC FOOT AND ANKLE CLINIC VISIT     Assessment:     Encounter Diagnoses   Name Primary? • Adrienne's deformity of right heel    • Insertional Achilles tendinopathy Yes   • Retrocalcaneal bursitis (back of heel), right               Plan:   · The patient verbalized understanding of exam findings and treatment plan  We engaged in the shared decision-making process and treatment options were discussed at length with the patient  Surgical and conservative management discussed today along with risks and benefits  · Patient has insertional achilles tendinopathy and Adrienne's deformity  · Discontinue use of CAM boot into supportive shoe with heal lift  · Continue physical therapy   Return in about 2 months (around 1/15/2023)  History of Present Illness:   Chief Complaint:   Chief Complaint   Patient presents with   • Right Ankle - Pain     Sharon Beata is a 62 y o  female who is being seen for right achilles pain  She notes chronic achilles pain exaserbated with activity  Patient has previously been seen by podiatry, sent to physical therapy instructed on proper foot wear, and recently placed in a CAM boot  Pain is localized at achilles insertion with minimal radiating and described as sharp and severe  Patient denies numbness, tingling or radicular pain  Denies history of neuropathy  Patient does not smoke, does not have diabetes and does not take blood thinners  Patient denies family history of anesthesia complications and has not had any complications with anesthesia         Pain/symptom timing:  Worse during the day when active  Pain/symptom context:  Worse with activites and work  Pain/symptom modifying factors:  Rest makes better, activities make worse  Pain/symptom associated signs/symptoms: none    Prior treatment   · NSAIDsYes    · Injections Yes   · Bracing/Orthotics Yes · Physical Therapy Yes     Orthopedic Surgical History:   See below    Past Medical, Surgical and Social History:  Past Medical History:  has a past medical history of Anxiety, Arthritis, Degenerative disc disease, cervical, Depression, and History of herniated intervertebral disc (10/19/2005)  Problem List: does not have any pertinent problems on file  Past Surgical History:  has a past surgical history that includes Appendectomy;  section; Foot surgery (Left, ); Knee surgery; Tubal ligation; Hysterectomy; and Vulvectomy  Family History: family history includes Anxiety disorder in her mother; Arthritis in her father and mother; Atrial fibrillation in her father; Breast cancer (age of onset: 79) in her maternal grandmother; Diabetes in her mother; Heart disease in her mother; No Known Problems in her brother and maternal aunt; Obesity in her sister  Social History:  reports that she has never smoked  She has never used smokeless tobacco  She reports current alcohol use of about 2 0 standard drinks of alcohol per week  She reports that she does not use drugs  Current Medications: has a current medication list which includes the following prescription(s): cholecalciferol, clobetasol, clonazepam, diclofenac sodium, docusate sodium, meloxicam, misc natural products, fish oil, polyethylene glycol, restasis, sertraline, verapamil, and sulindac  Allergies: is allergic to bee venom, meperidine, and penicillins       Review of Systems:  General- denies fever/chills  HEENT- denies hearing loss or sore throat  Eyes- denies eye pain or visual disturbances, denies red eyes  Respiratory- denies cough or SOB  Cardio- denies chest pain or palpitations  GI- denies abdominal pain  Endocrine- denies urinary frequency  Urinary- denies pain with urination  Musculoskeletal- Negative except noted above  Skin- denies rashes or wounds  Neurological- denies dizziness or headache  Psychiatric- denies anxiety or difficulty concentrating    Physical Exam:   Ht 5' 6" (1 676 m)   Wt 93 9 kg (207 lb)   BMI 33 41 kg/m²   General/Constitutional: No apparent distress: well-nourished and well developed  Eyes: normal ocular motion  Cardio: RRR, Normal S1S2, No m/r/g  Lymphatic: No appreciable lymphadenopathy  Respiratory: Non-labored breathing, CTA b/l no w/c/r  Vascular: No edema, swelling or tenderness, except as noted in detailed exam   Integumentary: No impressive skin lesions present, except as noted in detailed exam   Neuro: No ataxia or tremors noted  Psych: Normal mood and affect, oriented to person, place and time  Appropriate affect  Musculoskeletal: Normal, except as noted in detailed exam and in HPI  Examination    Right    Gait Normal   Musculoskeletal Tender to palpation at achilles insertion    Skin Normal       Nails Normal    Range of Motion  20 degrees dorsiflexion, 40 degrees plantarflexion  Subtalar motion: WNL    Stability Stable    Muscle Strength 5/5 tibialis anterior  5/5 gastrocnemius-soleus  5/5 posterior tibialis  5/5 peroneal/eversion strength  5/5 EHL  5/5 FHL    Neurologic Normal    Sensation Intact to light touch throughout sural, saphenous, superficial peroneal, deep peroneal and medial/lateral plantar nerve distributions  Bel[place]-Bety 5 07 filament (10g) testing deferred  Cardiovascular Brisk capillary refill < 2 seconds,intact DP and PT pulses    Special Tests None      Imaging Studies:   3 views of the right foot were taken, reviewed and interpreted independently that demonstrate liz's deformity, and bone spur at achilles insertion noted  Reviewed by me personally  Scribe Attestation    I,:  Marquis Davis am acting as a scribe while in the presence of the attending physician :       I,:  Cuba Andujar MD personally performed the services described in this documentation    as scribed in my presence :             Arlyne Covert Lachman, MD  Foot & Ankle Surgery   Department of Orthopaedic [address] [facility]      I personally performed the service  Newt Adas Lachman, MD

## 2022-11-15 NOTE — PATIENT INSTRUCTIONS
Halgunds deformity, with insertional enthysophyte and insertional achilles tendinopathy  Surgery would include haglunds resection, achilles debridement and repair and then retrocalcaneal bursectomy

## 2022-11-29 ENCOUNTER — EVALUATION (OUTPATIENT)
Dept: PHYSICAL THERAPY | Facility: CLINIC | Age: 57
End: 2022-11-29

## 2022-11-29 DIAGNOSIS — M76.60 INSERTIONAL ACHILLES TENDINOPATHY: Primary | ICD-10-CM

## 2022-11-29 DIAGNOSIS — M77.51 RETROCALCANEAL BURSITIS (BACK OF HEEL), RIGHT: ICD-10-CM

## 2022-11-29 NOTE — PROGRESS NOTES
Daily Note/Progress Note      Today's date: 2022  Patient name: Jayna Valadez  : 1965  MRN: [id number]  Referring provider: Joseph Randolph MD  Dx:   Encounter Diagnosis     ICD-10-CM    1  Insertional Achilles tendinopathy  M76 60 Ambulatory Referral to Physical Therapy      2  Retrocalcaneal bursitis (back of heel), right  M77 51 Ambulatory Referral to Physical Therapy          Subjective: Pt reports Pain level at rest:  2-3 /10, pain level with ADLS:  4 /63, pain level at worst: 5/10 which occurs few times a week, after a busy work day  At this time, the functional limitations include: inc pain with prolong standing and walking, difficulty negotiating stairs, squatting is painful   Patient saw ortho 2 weeks ago and was diagnosed with hoaglands deformity and calcific achilles tendonitis and 2 bone spurs  Discussed surgery: although surgery cannot be performed for 4 months due to recent injections  Has a follow up early January with ortho  Put heel lift in shoe  removed her from cam walker       Objective: See treatment diary below    Goals (22)  STG:  + Patient will report a 40% improvement in symptoms (2-3 weeks) not met  + Patient will be independent in basic HEP (2-3 weeks) met   + Patient will demonstrate reduced gait deviations ambulating on level surfaces   (2-3 week) not met   + Patient will demonstrate an increase in flexibility right calf musculautre by one grade (2-3 weeks) not met   + Patient will increase SLS time by 10  seconds (2-3 weeks) not met   + Patient will report increased ease walking due to a reduction in pain (2-3 weeks) not met       LTG:  + Patient will have pain level 0/10 right ankle  with ADL's (4-6 weeks) not met   + Patient will report a 70% improvement in symptoms (4-6 weeks) not met   + Patient will increase FOTO score by 10 points  (10 sessions) not met   + Patient will be independent in comprehensive HEP (4-6 weeks) not met   + Patient will demonstrate no gait deviations ambulating on level surfaces   (4-6 week)not met   + Patient will negotiate stairs reciprically with use of one railing and painfree  (4-6 weeks)not met   + Patient will demonstrate increase  MMT of left hip to  4/5 for maximum function  (4-6 weeks) met   + Patient will report no functional limitations   (4-6 weeks) not met     Plan    Objective (11/1/22)(11/29/22)    Static Posture   History of degenerative disc disease: experiences minimal back pain but no history of sciatica since pregnancy (34 years ago)    Posture:reduced arch support ad     Gait: no assistive device, reduced roll over and push off on right LE  ((status quo)    Elevations: climbing: reciprocally, descending step to per patient report (step to with increase in pain)    Functional activities:  SLS: Right 30 sec + pain     Left: 13:69 sec needs cuing to facilitate core during SLS (Right: 15 sec, left 23 sec)(left 14 sec, Right: 24 sec)  Squat: wnl with no cuing + pain right achilles tendon    Palpation: no TTP in area of right PF, + TTP medial aspect of right soleus, and along achillies tendon distal aspect rigth  (+ TTP in area of achilles tendon insertion)    ROM:  Ankle Dorsiflexion: Right: wnl  + pain Left: wnl (status quo)   Ankle Plantarflexion: Right: wnl + pain  Left: wnl ( wnl no pain )   Ankle Inversion: Right: wnl + pain Left: wnl ( wnl no pain )  Ankle Eversion: Right: wnl + pain Left: wnl ( wnl no pain)  Calcaneal rocking: Right: wnl    Left: wnl   First MTP flexion: Right: wnl   Left: wnl   First MTP extension: Right: fair    Left: wnl ( wnl)    Joint mobility:   Talocrural: Right: fair   Left: fair ( left wnl)  Metatarsal: Right: fair    Left: fair (left wnl)    MMT:  Ankle Dorsiflexion: Right: 4 /5 + pain left: 4/5 (4/5 no pain)(4/5 + pain )  Ankle Plantarflexion: Right: 4 /5 + pain Left: 4/5 (4/5 no pain)  Ankle Inversion: Right: 4/5  + pain Left: 4/5 (4/5 no pain)  Ankle Eversion: Right: 4 /5 + pain Left: 4/5 (4/5 no pain)  able to walk on heels and toes + pain on right      Hip abd: left 3+/5  Right: 4/5 ( left 4/5 )  Clamshells: left 4-/5  Right: 4/5 (left 4/5)    Flexibility:  Gastroc/soleus: Right:  Fair + pain   Left fair no pain (Right: fair)(status quo)   Hamstrings: Right:  Good  Left good           Assessment: Venu Luna demonstrates/reports functional limitations due to significant pain in achilles tendon area  Patient has discussed surgical options which she needs to wait 4 months for surgical correction due to recent injections  Patient was referred back t PT to improve fleixbility, reduce soft tissue tension and inflammation and reduce symtpoms without high repetition strengthening  Patient agreeable with plan  Recommend 2 x week for 2-3 weeks to assess symptom response then possible reduce to 1 x week if no improvement is observed     The goal status of Lashawn Garrison is indicated above   Trial of Ktape to reduced gastroc muscle tension and space correction for achilles tendon performed this session       Plan: Plan details:  1-3 x week, 4-6 weeks: HEP development, stretching LE musculature per objective findings, strengthening LE musculature per objective findings, A/AA/PROM ankle motions, joint mobilizations prn, posture education especially foot posture prn, STM/MI as needed to reduce muscle tension as per objective findings, muscle reeducation prn, gait/balance training, stability training, Mobley/Ktape prn PLOC discussed and agreed upon with patient      Patient would benefit from: skilled physical therapy  Frequency: 1-2x week 2-4 weeks  Plan of Care beginning date: 11/29/22  Plan of Care expiration date: 12/31/22  Treatment plan discussed with: patient          Eval/ Re-eval Auth #/ Referral # Total visits Start date  Expiration date Total active units  Total manual units  PT only or  PT+OT?   9/21/22 NO AUTH 30 pcy                                                      Precautions: Digestive  Irritable bowel syndrome    GERD (gastroesophageal reflux disease)       Endocrine  Impaired fasting glucose       Respiratory  Allergic rhinitis due to pollen       Cardiovascular and Mediastinum  Benign essential hypertension       Nervous and Auditory  Radiculopathy of lumbar region    Tarsal tunnel syndrome, right    Meralgia paresthetica of left side    Intervertebral disc disorder with radiculopathy of lumbosacral region       Musculoskeletal and Integument  Psoriasis       Genitourinary  Overactive bladder    Lichen sclerosus of female genitalia    Stage 3a chronic kidney disease       Other  Generalized anxiety disorder      Specialty Daily Treatment Diary     Insurance:    foto performed     Visits: 7/30 8/30 9/30 10/30 11/30   Manual 10/20/22 10/24/22 11/1/22 11/8/22 11/29/22   PROM Ankle: all motions, hallux extension   --  reeval performed   STM/MI of soleus, gastroc complex prn  Graston to achilles tendon, 8 min Graston to achilles tendon, 8 min Graston to achilles tendon, 8 min avoiding blisters on heel believed to be from boot Graston to achilles tendon, 8 min avoiding blisters on heel believed to be from boot    Joint mobs: metatarsals, TC joint TC AP/PA gr II-III, 3x20    Subtalar inv/ev, gr II-III, 3x20   TC AP/PA gr III-IV, 3x20  V- 1 x    Ktape Navicular sling and  Space correction directly over achilles tendon Navicular sling and  Space correction directly over achilles tendon --  Paper off tension to inhibit gastroc muscle complex,  Space correction directly over achilles insertion                   Exercise Diary         PROTOCOL:         Ther ex:         Rec bike/nustep        prostretch        Soleus stretch with half roll        Ankle Tband 4 way   Ankle DF with TC joint mob with band 5 sec x 10       Ankle Df stretch on step  Forward step downs, 4", 3x10              Bilateral heel raises with eccentric lowering on right Standing HR (gastroc bias):  1st set - regular, x15  2nd set - weightshift for ecc lower on right, x15  3rd set - regular, x15    Sitting HR (soleus bias): with 25# weight on lap, 2x25    Unilateral HR on leg press: with block, 65#, 3x10 Standing HR (gastroc bias):  1st set - regular, x15  2nd set - weightshift for ecc lower on right, x15  3rd set - weightshift for ecc lower, x15    Sitting HR (soleus bias): with 50# weight on lap, 3x15    Unilateral HR on leg press: with block, 75#, 3x15 Standing HR (gastroc bias):  1st set - regular, x15  2nd set - weightshift for ecc lower on right, x15  3rd set - weightshift for ecc lower, x15    Sitting HR (soleus bias): with 50# weight on lap, 3x15    Unilateral HR on leg press: with block, 75#, 3x15 Standing HR (gastroc bias):  1st set - regular, x15 x 2 rounds   2nd set - weightshift for ecc lower on right, x15 x 2 rounds   3rd set -  ecc lower right only , x15 x 2 rounds     Sitting HR (soleus bias): with 50# weight on lap, 3x15    Unilateral HR on leg press: with block, 75#, 3x15    sidelying hip abd  Side stepping w/ light loop at feet, 10 feet, 3 laps      Side lying clamshells        Standing hip abd heel against wall        Hip burners                         Neuro Gary:        glute set with bridge progression        Hip burners        SLS advancing to 3 way hip        Rockerboard:  DF/PF  INV/EV                Ther Activity:        Reevaluation   performed             Gait Training:                 HEP:                Modalities:                Ice        Total time:           Access Code: [email or web address]  URL: [email or web address]  Date: 11/01/2022  Prepared by:  Charbel Ortega    Exercises  · Standing Ankle Plantarflexion Self-Mobilization - 1 x daily - 7 x weekly - 1 sets - 10 reps - 5 sec hold

## 2022-12-01 ENCOUNTER — OFFICE VISIT (OUTPATIENT)
Dept: PHYSICAL THERAPY | Facility: CLINIC | Age: 57
End: 2022-12-01

## 2022-12-01 DIAGNOSIS — M77.51 RETROCALCANEAL BURSITIS (BACK OF HEEL), RIGHT: ICD-10-CM

## 2022-12-01 DIAGNOSIS — M76.61 ACHILLES TENDINITIS OF RIGHT LOWER EXTREMITY: ICD-10-CM

## 2022-12-01 DIAGNOSIS — M79.671 RIGHT FOOT PAIN: ICD-10-CM

## 2022-12-01 DIAGNOSIS — M76.60 INSERTIONAL ACHILLES TENDINOPATHY: Primary | ICD-10-CM

## 2022-12-01 NOTE — PROGRESS NOTES
Daily Note     Today's date: 2022  Patient name: Scout Driver  : 1965  MRN: [id number]  Referring provider: Ad Price DPM  Dx:   Encounter Diagnosis     ICD-10-CM    1  Insertional Achilles tendinopathy  M76 60       2  Retrocalcaneal bursitis (back of heel), right  M77 51       3  Right foot pain  M79 671       4  Achilles tendinitis of right lower extremity  M76 61                      Subjective: Pain level is 5/10  K-tape did not help last session  Objective: See treatment diary below      Assessment: Patient was referred back to PT to improve fleixbility, reduce soft tissue tension and inflammation and reduce symtpoms without high repetition strengthening  Patient agreeable with plan and was able to discuss plan with primary therapist        Plan: Continue per plan of care        Eval/ Re-eval Auth #/ Referral # Total visits Start date  Expiration date Total active units  Total manual units  PT only or  PT+OT?   22 NO AUTH 30 pcy                                                      Precautions:   Digestive  Irritable bowel syndrome    GERD (gastroesophageal reflux disease)       Endocrine  Impaired fasting glucose       Respiratory  Allergic rhinitis due to pollen       Cardiovascular and Mediastinum  Benign essential hypertension       Nervous and Auditory  Radiculopathy of lumbar region    Tarsal tunnel syndrome, right    Meralgia paresthetica of left side    Intervertebral disc disorder with radiculopathy of lumbosacral region       Musculoskeletal and Integument  Psoriasis       Genitourinary  Overactive bladder    Lichen sclerosus of female genitalia    Stage 3a chronic kidney disease       Other  Generalized anxiety disorder      Specialty Daily Treatment Diary     Insurance:    foto performed      Visits: 7/30 8/30 9/30 10/30 11/30 12/30   Manual 10/20/22 10/24/22 11/1/22 11/8/22 11/29/22 12/1/22   PROM Ankle: all motions, hallux extension   --  reeval performed    STM/MI of soleus, gastroc complex prn  Graston to achilles tendon, 8 min Graston to achilles tendon, 8 min Graston to achilles tendon, 8 min avoiding blisters on heel believed to be from boot Graston to achilles tendon, 8 min avoiding blisters on heel believed to be from boot  Graston to achilles tendon, 10 min avoiding blisters on heel believed to be from boot   Joint mobs: metatarsals, TC joint TC AP/PA gr II-III, 3x20    Subtalar inv/ev, gr II-III, 3x20   TC AP/PA gr III-IV, 3x20  V- 1 x  TC AP/PA gr III-IV, 3x20  V- 1 x   Ktape Navicular sling and  Space correction directly over achilles tendon Navicular sling and  Space correction directly over achilles tendon --  Paper off tension to inhibit gastroc muscle complex,  Space correction directly over achilles insertion                      Exercise Diary          PROTOCOL:          Ther ex:          Rec bike/nustep         prostretch         Soleus stretch with half roll         Ankle Tband 4 way   Ankle DF with TC joint mob with band 5 sec x 10        Ankle Df stretch on step  Forward step downs, 4", 3x10                Bilateral heel raises with eccentric lowering on right Standing HR (gastroc bias):  1st set - regular, x15  2nd set - weightshift for ecc lower on right, x15  3rd set - regular, x15    Sitting HR (soleus bias): with 25# weight on lap, 2x25    Unilateral HR on leg press: with block, 65#, 3x10 Standing HR (gastroc bias):  1st set - regular, x15  2nd set - weightshift for ecc lower on right, x15  3rd set - weightshift for ecc lower, x15    Sitting HR (soleus bias): with 50# weight on lap, 3x15    Unilateral HR on leg press: with block, 75#, 3x15 Standing HR (gastroc bias):  1st set - regular, x15  2nd set - weightshift for ecc lower on right, x15  3rd set - weightshift for ecc lower, x15    Sitting HR (soleus bias): with 50# weight on lap, 3x15    Unilateral HR on leg press: with block, 75#, 3x15 Standing HR (gastroc bias):  1st set - regular, x15 x 2 rounds   2nd set - weightshift for ecc lower on right, x15 x 2 rounds   3rd set -  ecc lower right only , x15 x 2 rounds     Sitting HR (soleus bias): with 50# weight on lap, 3x15    Unilateral HR on leg press: with block, 75#, 3x15     sidelying hip abd  Side stepping w/ light loop at feet, 10 feet, 3 laps       Side lying clamshells         Standing hip abd heel against wall         Hip burners                            Neuro Gary:         glute set with bridge progression         Hip burners         SLS advancing to 3 way hip         Rockerboard:  DF/PF  INV/EV                  Ther Activity:         Reevaluation   performed               Gait Training:                   HEP:                  Modalities:         MH         Ice         Total time:            Access Code: [email or web address]  URL: [email or web address]/  Date: 11/01/2022  Prepared by:  Charbel Ortega    Exercises  · Standing Ankle Plantarflexion Self-Mobilization - 1 x daily - 7 x weekly - 1 sets - 10 reps - 5 sec hold

## 2022-12-06 ENCOUNTER — OFFICE VISIT (OUTPATIENT)
Dept: PHYSICAL THERAPY | Facility: CLINIC | Age: 57
End: 2022-12-06

## 2022-12-06 DIAGNOSIS — M77.51 RETROCALCANEAL BURSITIS (BACK OF HEEL), RIGHT: Primary | ICD-10-CM

## 2022-12-06 DIAGNOSIS — M76.60 INSERTIONAL ACHILLES TENDINOPATHY: ICD-10-CM

## 2022-12-06 DIAGNOSIS — M79.671 RIGHT FOOT PAIN: ICD-10-CM

## 2022-12-06 DIAGNOSIS — M76.61 ACHILLES TENDINITIS OF RIGHT LOWER EXTREMITY: ICD-10-CM

## 2022-12-06 NOTE — PROGRESS NOTES
Daily Note         Today's date:   Patient name: Екатерина Harrison  : 1965  MRN: [id number]  Referring provider: Rosie Ott DPM  Dx:   Encounter Diagnosis     ICD-10-CM    1  Retrocalcaneal bursitis (back of heel), right  M77 51       2  Insertional Achilles tendinopathy  M76 60       3  Right foot pain  M79 671       4  Achilles tendinitis of right lower extremity  M76 61           Subjective: Clarita Luna reports pain level entering today is 2-3/10 and states due to the heel lift in her shoe she has developed a blister from shoe hitting heel  States the lift is more comfortable with walking but end of the day pain is the same  Objective: See treatment diary below    Assessment:  + blister on lateral aspect of achlles tendon size of a pencil eraser  Reduced gait deviations upon exiting today       Plan: continue to improve soft tissue moiblity/flexbility         Eval/ Re-eval Auth #/ Referral # Total visits Start date  Expiration date Total active units  Total manual units  PT only or  PT+OT?   22 RU [place] 30 pcy                                                      Precautions:   Digestive  Irritable bowel syndrome    GERD (gastroesophageal reflux disease)       Endocrine  Impaired fasting glucose       Respiratory  Allergic rhinitis due to pollen       Cardiovascular and Mediastinum  Benign essential hypertension       Nervous and Auditory  Radiculopathy of lumbar region    Tarsal tunnel syndrome, right    Meralgia paresthetica of left side    Intervertebral disc disorder with radiculopathy of lumbosacral region       Musculoskeletal and Integument  Psoriasis       Genitourinary  Overactive bladder    Lichen sclerosus of female genitalia    Stage 3a chronic kidney disease       Other  Generalized anxiety disorder      Specialty Daily Treatment Diary     Insurance:  foto performed       Visits: 9/30 10/30 11/30 12/30 13/30   Manual 22 PROM Ankle: all motions, hallux extension --  reeval performed  Performed: including hallux extension   STM/MI of soleus, gastroc complex prn  Graston to achilles tendon, 8 min avoiding blisters on heel believed to be from boot Graston to achilles tendon, 8 min avoiding blisters on heel believed to be from boot  Graston to achilles tendon, 10 min avoiding blisters on heel believed to be from boot Graston to achilles tendon, 12 min avoiding blister  IASTM: calf musculature with roll out stick    Joint mobs: metatarsals, TC joint  TC AP/PA gr III-IV, 3x20  V- 1 x  TC AP/PA gr III-IV, 3x20  V- 1 x TC AP/PA gr III-IV, performed      Ktape --  Paper off tension to inhibit gastroc muscle complex,  Space correction directly over achilles insertion  TC joint distraction        Manual gastroc stretch 10 sec x 5             Exercise Diary         PROTOCOL:         Ther ex:         Rec bike/nustep        prostretch        Soleus stretch with half roll        Ankle Tband 4 way Ankle DF with TC joint mob with band 5 sec x 10         Ankle Df stretch on step                Bilateral heel raises with eccentric lowering on right Standing HR (gastroc bias):  1st set - regular, x15  2nd set - weightshift for ecc lower on right, x15  3rd set - weightshift for ecc lower, x15    Sitting HR (soleus bias): with 50# weight on lap, 3x15    Unilateral HR on leg press: with block, 75#, 3x15 Standing HR (gastroc bias):  1st set - regular, x15 x 2 rounds   2nd set - weightshift for ecc lower on right, x15 x 2 rounds   3rd set -  ecc lower right only , x15 x 2 rounds     Sitting HR (soleus bias): with 50# weight on lap, 3x15    Unilateral HR on leg press: with block, 75#, 3x15      sidelying hip abd        Side lying clamshells        Standing hip abd heel against wall        Hip burners                         Neuro Gary:        glute set with bridge progression        Hip burners        SLS advancing to 3 way hip Rockerboard:  DF/PF  INV/EV                Ther Activity:        Reevaluation performed               Gait Training:                 HEP:                Modalities:        MH        Ice        Total time:           Access Code: [email or web address]  URL: [email or web address]  Date: 11/01/2022  Prepared by:  Nikhil Copping    Exercises  · Standing Ankle Plantarflexion Self-Mobilization - 1 x daily - 7 x weekly - 1 sets - 10 reps - 5 sec hold

## 2022-12-08 ENCOUNTER — OFFICE VISIT (OUTPATIENT)
Dept: PHYSICAL THERAPY | Facility: CLINIC | Age: 57
End: 2022-12-08

## 2022-12-08 DIAGNOSIS — M77.51 RETROCALCANEAL BURSITIS (BACK OF HEEL), RIGHT: Primary | ICD-10-CM

## 2022-12-08 DIAGNOSIS — M79.671 RIGHT FOOT PAIN: ICD-10-CM

## 2022-12-08 DIAGNOSIS — M76.61 ACHILLES TENDINITIS OF RIGHT LOWER EXTREMITY: ICD-10-CM

## 2022-12-08 DIAGNOSIS — M76.60 INSERTIONAL ACHILLES TENDINOPATHY: ICD-10-CM

## 2022-12-08 NOTE — PROGRESS NOTES
Daily Note         Today's date: 2022  Patient name: Amee Watson  : 1965  MRN: [id number]  Referring provider: Sarai Harrison DPM  Dx:   Encounter Diagnosis     ICD-10-CM    1  Retrocalcaneal bursitis (back of heel), right  M77 51       2  Insertional Achilles tendinopathy  M76 60       3  Right foot pain  M79 671       4  Achilles tendinitis of right lower extremity  M76 61           Subjective: Soila Luna reports she gets relief from PT but not lasting into the next day  Objective: See treatment diary below    Assessment:  performed grastin on medial gastroc muscle belly in addition to achilles tendon due to increased restirciton noted in muscle belly  patient denied pain for both areas  added genlte strengthening with tband wihtout an increase in pain  Patient was issued updated Hep in written form and appropriate band if needed  Patient demonstrated independence with these exercises  Plan: progress gentle strenghtneing avoiding high repetitions with high load           Eval/ Re-eval Auth #/ Referral # Total visits Start date  Expiration date Total active units  Total manual units  PT only or  PT+OT?   22 NO AUTH 30 pcy                                                      Precautions:   Digestive  Irritable bowel syndrome    GERD (gastroesophageal reflux disease)       Endocrine  Impaired fasting glucose       Respiratory  Allergic rhinitis due to pollen       Cardiovascular and Mediastinum  Benign essential hypertension       Nervous and Auditory  Radiculopathy of lumbar region    Tarsal tunnel syndrome, right    Meralgia paresthetica of left side    Intervertebral disc disorder with radiculopathy of lumbosacral region       Musculoskeletal and Integument  Psoriasis       Genitourinary  Overactive bladder    Lichen sclerosus of female genitalia    Stage 3a chronic kidney disease       Other  Generalized anxiety disorder      Specialty Daily Treatment Diary Insurance:         Visits: 10/30 11/30 12/30 13/30 14/30   Manual 11/8/22 11/29/22 12/1/22 12/6/22 12/8/22   PROM Ankle: all motions, hallux extension  reeval performed  Performed: including hallux extension Performed: including hallux extension   STM/MI of soleus, gastroc complex prn  Graston to achilles tendon, 8 min avoiding blisters on heel believed to be from boot  Graston to achilles tendon, 10 min avoiding blisters on heel believed to be from boot Graston to achilles tendon, 12 min avoiding blister  IASTM: calf musculature with roll out stick  Graston to achilles tendon, 12 min avoiding blister  Gastrin medial calf musculature     Joint mobs: metatarsals, TC joint TC AP/PA gr III-IV, 3x20  V- 1 x  TC AP/PA gr III-IV, 3x20  V- 1 x TC AP/PA gr III-IV, performed    TC AP/PA gr III-IV, performed   Ktape  Paper off tension to inhibit gastroc muscle complex,  Space correction directly over achilles insertion  TC joint distraction TC joint distraction       Manual gastroc stretch 10 sec x 5   Manual gastroc stretch 10 sec x 5           Exercise Diary         PROTOCOL:         Ther ex:         Rec bike/nustep        prostretch        Soleus stretch with half roll        Ankle Tband 4 way     Grn: 10 x 2 all directions   Ankle Df stretch on step                Bilateral heel raises with eccentric lowering on right Standing HR (gastroc bias):  1st set - regular, x15 x 2 rounds   2nd set - weightshift for ecc lower on right, x15 x 2 rounds   3rd set -  ecc lower right only , x15 x 2 rounds     Sitting HR (soleus bias): with 50# weight on lap, 3x15    Unilateral HR on leg press: with block, 75#, 3x15       sidelying hip abd        Side lying clamshells        Standing hip abd heel against wall        Hip burners                         Neuro Gary:        glute set with bridge progression        Hip burners        SLS advancing to 3 way hip        Rockerboard:  DF/PF  INV/EV                Ther Activity: Reevaluation                Gait Training:                 HEP:                Modalities:        MH        Ice        Total time:           Access Code: [email or web address]  URL: [email or web address]/  Date: 11/01/2022  Prepared by: Bradly Fonseca    Exercises  · Standing Ankle Plantarflexion Self-Mobilization - 1 x daily - 7 x weekly - 1 sets - 10 reps - 5 sec hold      Access Code: [email or web address]  URL: [email or web address]  Date: 12/08/2022  Prepared by:  Bradly Fonseca    Exercises  • Long Sitting Ankle Eversion with Resistance - 1 x daily - 7 x weekly - 2 sets - 10 reps  • Long Sitting Ankle Eversion with Resistance - 1 x daily - 7 x weekly - 2 sets - 10 reps  • Long Sitting Ankle Plantar Flexion with Resistance - 1 x daily - 7 x weekly - 3 sets - 10 reps  • Long Sitting Ankle Dorsiflexion with Anchored Resistance - 1 x daily - 7 x weekly - 2 sets - 10 reps

## 2022-12-13 ENCOUNTER — OFFICE VISIT (OUTPATIENT)
Dept: PHYSICAL THERAPY | Facility: CLINIC | Age: 57
End: 2022-12-13

## 2022-12-13 DIAGNOSIS — M79.671 RIGHT FOOT PAIN: ICD-10-CM

## 2022-12-13 DIAGNOSIS — M76.61 ACHILLES TENDINITIS OF RIGHT LOWER EXTREMITY: ICD-10-CM

## 2022-12-13 DIAGNOSIS — M76.60 INSERTIONAL ACHILLES TENDINOPATHY: ICD-10-CM

## 2022-12-13 DIAGNOSIS — M77.51 RETROCALCANEAL BURSITIS (BACK OF HEEL), RIGHT: Primary | ICD-10-CM

## 2022-12-13 NOTE — PROGRESS NOTES
Daily Note         Today's date: 2022  Patient name: Mirza Husbands  : 1965  MRN: [id number]  Referring provider: Nitesh Mullen DPM  Dx:   Encounter Diagnosis     ICD-10-CM    1  Retrocalcaneal bursitis (back of heel), right  M77 51       2  Insertional Achilles tendinopathy  M76 60       3  Right foot pain  M79 671       4  Achilles tendinitis of right lower extremity  M76 61           Subjective: Marshall Brewster Bisci reports she is highly frustrated with the amount of pain she is in consistently from day to day  Overall she sees no improvement from day to day       Objective: See treatment diary below    Assessment:  discussed PLOC with patient and agreed to dc PT services next sesison due to overall no improvement  patient advised to return to using the boot for comfort at work during afternoons due to patient describes being highly painful by the end of the day    Patient vocalized good understanding and agreement to not over utilize  Only for pain relief  Plan: d/c next session          Eval/ Re-eval Auth #/ Referral # Total visits Start date  Expiration date Total active units  Total manual units  PT only or  PT+OT?   22 NO AUTH 30 pcy                                                      Precautions:   Digestive  Irritable bowel syndrome    GERD (gastroesophageal reflux disease)       Endocrine  Impaired fasting glucose       Respiratory  Allergic rhinitis due to pollen       Cardiovascular and Mediastinum  Benign essential hypertension       Nervous and Auditory  Radiculopathy of lumbar region    Tarsal tunnel syndrome, right    Meralgia paresthetica of left side    Intervertebral disc disorder with radiculopathy of lumbosacral region       Musculoskeletal and Integument  Psoriasis       Genitourinary  Overactive bladder    Lichen sclerosus of female genitalia    Stage 3a chronic kidney disease       Other  Generalized anxiety disorder      Specialty Daily Treatment Diary Insurance:         Visits: 11/30 12/30 13/30 14/30 15/30   Manual 11/29/22 12/1/22 12/6/22 12/8/22 12/13/22   PROM Ankle: all motions, hallux extension reeval performed  Performed: including hallux extension Performed: including hallux extension Performed: including hallux extension   STM/MI of soleus, gastroc complex prn   Graston to achilles tendon, 10 min avoiding blisters on heel believed to be from boot Graston to achilles tendon, 12 min avoiding blister  IASTM: calf musculature with roll out stick  Graston to achilles tendon, 12 min avoiding blister  Gastrin medial calf musculature  performed   Joint mobs: metatarsals, TC joint  TC AP/PA gr III-IV, 3x20  V- 1 x TC AP/PA gr III-IV, performed    TC AP/PA gr III-IV, performed TC AP/PA gr III-IV, performed   Ktape Paper off tension to inhibit gastroc muscle complex,  Space correction directly over achilles insertion  TC joint distraction TC joint distraction performed      Manual gastroc stretch 10 sec x 5   Manual gastroc stretch 10 sec x 5 10 sec x 5             Exercise Diary         PROTOCOL:         Ther ex:         prostretch        Soleus stretch with half roll        Ankle Tband 4 way    Grn: 10 x 2 all directions Grn: 10 x 2 all directions   Ankle Df stretch on step                Bilateral heel raises with eccentric lowering on right     --   sidelying hip abd     2 x 10     Side lying clamshells     2 x 10             Neuro Gary:        glute set with bridge progression     2 x 10  standard   Hip burners        SLS advancing to 3 way hip                Ther Activity:        Reevaluation                Gait Training:                 HEP:                Modalities:                Ice        Total time:           Access Code: [email or web address]  URL: [email or web address]  Date: 11/01/2022  Prepared by:  Breana Cheek    Exercises  · Standing Ankle Plantarflexion Self-Mobilization - 1 x daily - 7 x weekly - 1 sets - 10 reps - 5 sec hold      Access Code: [email or web address]  URL: [email or web address] [employer]/  Date: 12/08/2022  Prepared by:  Naima Cotton  • Long Sitting Ankle Eversion with Resistance - 1 x daily - 7 x weekly - 2 sets - 10 reps  • Long Sitting Ankle Eversion with Resistance - 1 x daily - 7 x weekly - 2 sets - 10 reps  • Long Sitting Ankle Plantar Flexion with Resistance - 1 x daily - 7 x weekly - 3 sets - 10 reps  • Long Sitting Ankle Dorsiflexion with Anchored Resistance - 1 x daily - 7 x weekly - 2 sets - 10 reps

## 2022-12-15 ENCOUNTER — OFFICE VISIT (OUTPATIENT)
Dept: PHYSICAL THERAPY | Facility: CLINIC | Age: 57
End: 2022-12-15

## 2022-12-15 DIAGNOSIS — M79.671 RIGHT FOOT PAIN: ICD-10-CM

## 2022-12-15 DIAGNOSIS — M77.51 RETROCALCANEAL BURSITIS (BACK OF HEEL), RIGHT: Primary | ICD-10-CM

## 2022-12-15 DIAGNOSIS — M76.60 INSERTIONAL ACHILLES TENDINOPATHY: ICD-10-CM

## 2022-12-15 DIAGNOSIS — M76.61 ACHILLES TENDINITIS OF RIGHT LOWER EXTREMITY: ICD-10-CM

## 2022-12-15 NOTE — PROGRESS NOTES
Daily Note/PT discharge         Today's date: 12/15/2022  Patient name: Eduardo Jernigan  : 1965  MRN: [id number]  Referring provider: Kartik Orr DPM  Dx:   Encounter Diagnosis     ICD-10-CM    1  Retrocalcaneal bursitis (back of heel), right  M77 51       2  Insertional Achilles tendinopathy  M76 60       3  Right foot pain  M79 671       4  Achilles tendinitis of right lower extremity  M76 61           Subjective: Patience Colton Jeremy reports she is ready for discharge  Objective: See treatment diary below    Assessment:  patient dmeonstrated reduced tensioin in right calf musculature    Attributed to not working this day  States she used her boot for a couple hours yesterday after and was more comfortable in order to complete her day  Please refer to most recent reeval for d/c summary  Patient PLOC at this time is to follow up with ortho and schedule surgery             Plan: d/c to [facility] per patient request        Eval/ Re-eval Auth #/ Referral # Total visits Start date  Expiration date Total active units  Total manual units  PT only or  PT+OT?   22 NO AUTH 30 pcy                                                      Precautions:   Digestive  Irritable bowel syndrome    GERD (gastroesophageal reflux disease)       Endocrine  Impaired fasting glucose       Respiratory  Allergic rhinitis due to pollen       Cardiovascular and Mediastinum  Benign essential hypertension       Nervous and Auditory  Radiculopathy of lumbar region    Tarsal tunnel syndrome, right    Meralgia paresthetica of left side    Intervertebral disc disorder with radiculopathy of lumbosacral region       Musculoskeletal and Integument  Psoriasis       Genitourinary  Overactive bladder    Lichen sclerosus of female genitalia    Stage 3a chronic kidney disease       Other  Generalized anxiety disorder      Specialty Daily Treatment Diary     Insurance:         Visits: 12/30 13/30 14/30 15/30 16/30   Manual 22 12/8/22 12/13/22 12/15/22   PROM Ankle: all motions, hallux extension  Performed: including hallux extension Performed: including hallux extension Performed: including hallux extension Performed: including hallux extension   STM/MI of soleus, gastroc complex prn  Graston to achilles tendon, 10 min avoiding blisters on heel believed to be from boot Graston to achilles tendon, 12 min avoiding blister  IASTM: calf musculature with roll out stick  Graston to achilles tendon, 12 min avoiding blister  Gastrin medial calf musculature  performed IASTM of gastroc muscl ebelly, gastrin of achilles tendon insertion    Joint mobs: metatarsals, TC joint TC AP/PA gr III-IV, 3x20  V- 1 x TC AP/PA gr III-IV, performed    TC AP/PA gr III-IV, performed TC AP/PA gr III-IV, performed TC AP/PA gr III-IV, performed   Ktape  TC joint distraction TC joint distraction performed      Manual gastroc stretch 10 sec x 5   Manual gastroc stretch 10 sec x 5 10 sec x 5   10 sec x 5     Ther ex:         prostretch     gastroc Stretch w/ SOS 10 sec x 5     Soleus stretch with half roll        Ankle Tband 4 way   Grn: 10 x 2 all directions Grn: 10 x 2 all directions Grn: 10 x 2 all directions   Bilateral heel raises with eccentric lowering on right    --    sidelying hip abd    2 x 10   2 x 10     Side lying clamshells    2 x 10   2 x 10             Neuro Gary:        glute set with bridge progression    2 x 10  standard 2 x 10     Hip burners        SLS advancing to 3 way hip                Ther Activity:        Reevaluation                Gait Training:                 HEP:                Modalities:                Ice        Total time:           Access Code: [email or web address]  URL: [email or web address]  Date: 11/01/2022  Prepared by:  Elihue School    Exercises  · Standing Ankle Plantarflexion Self-Mobilization - 1 x daily - 7 x weekly - 1 sets - 10 reps - 5 sec hold      Access Code: [email or web address]  URL: [email or web address]  Date: 12/08/2022  Prepared by:  Alvino Cotton  • Long Sitting Ankle Eversion with Resistance - 1 x daily - 7 x weekly - 2 sets - 10 reps  • Long Sitting Ankle Eversion with Resistance - 1 x daily - 7 x weekly - 2 sets - 10 reps  • Long Sitting Ankle Plantar Flexion with Resistance - 1 x daily - 7 x weekly - 3 sets - 10 reps  • Long Sitting Ankle Dorsiflexion with Anchored Resistance - 1 x daily - 7 x weekly - 2 sets - 10 reps

## 2022-12-20 ENCOUNTER — APPOINTMENT (OUTPATIENT)
Dept: PHYSICAL THERAPY | Facility: CLINIC | Age: 57
End: 2022-12-20

## 2022-12-22 ENCOUNTER — APPOINTMENT (OUTPATIENT)
Dept: PHYSICAL THERAPY | Facility: CLINIC | Age: 57
End: 2022-12-22

## 2022-12-27 ENCOUNTER — APPOINTMENT (OUTPATIENT)
Dept: PHYSICAL THERAPY | Facility: CLINIC | Age: 57
End: 2022-12-27

## 2022-12-29 ENCOUNTER — APPOINTMENT (OUTPATIENT)
Dept: PHYSICAL THERAPY | Facility: CLINIC | Age: 57
End: 2022-12-29

## 2023-01-12 ENCOUNTER — OFFICE VISIT (OUTPATIENT)
Dept: FAMILY MEDICINE CLINIC | Facility: CLINIC | Age: 58
End: 2023-01-12

## 2023-01-12 VITALS
SYSTOLIC BLOOD PRESSURE: 110 MMHG | HEART RATE: 64 BPM | BODY MASS INDEX: 33.43 KG/M2 | RESPIRATION RATE: 14 BRPM | HEIGHT: 66 IN | DIASTOLIC BLOOD PRESSURE: 80 MMHG | WEIGHT: 208 LBS | TEMPERATURE: 96 F

## 2023-01-12 DIAGNOSIS — J02.0 ACUTE STREPTOCOCCAL PHARYNGITIS: Primary | ICD-10-CM

## 2023-01-12 LAB — S PYO AG THROAT QL: POSITIVE

## 2023-01-12 RX ORDER — AZITHROMYCIN 250 MG/1
TABLET, FILM COATED ORAL
Qty: 6 TABLET | Refills: 0 | Status: SHIPPED | OUTPATIENT
Start: 2023-01-12 | End: 2023-01-17

## 2023-01-12 NOTE — PROGRESS NOTES
Assessment/Plan:    Strep positive, PCN allergic  Will start on Zithromax  F/u for any persistent or recurrent symptoms  1  Acute streptococcal pharyngitis  -     POCT rapid strepA  -     azithromycin (ZITHROMAX) 250 mg tablet; 2 tabs PO day 1, then 1 tab PO days 2-5    Recent Results (from the past 24 hour(s))   POCT rapid strepA    Collection Time: 01/12/23  1:23 PM   Result Value Ref Range     RAPID STREP A Positive (A) Negative             There are no Patient Instructions on file for this visit  Return if symptoms worsen or fail to improve  Subjective:      Patient ID: Navin Diaz is a 62 y o  female  Chief Complaint   Patient presents with   • Sore Throat     Onset: Sunday  Tested at home for covid yesterday- negative  klcma   • Ear Fullness   • Sinus Problem     Pressure  klcma       She developed some sinus congestion and clogged ears a few days ago  Cleared with Mucinex, but then yesterday her throat started to bother her  Today, throat was painful  No fevers/chills  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      The following portions of the patient's history were reviewed and updated as appropriate: allergies, current medications, past family history, past medical history, past social history, past surgical history and problem list     Review of Systems   Constitutional: Positive for fatigue  Negative for chills and fever  HENT: Positive for sore throat  Negative for congestion, ear pain, postnasal drip, rhinorrhea and sinus pressure  Respiratory: Negative for cough, shortness of breath and wheezing  Cardiovascular: Negative for chest pain  Gastrointestinal: Negative for abdominal pain, diarrhea, nausea and vomiting  Musculoskeletal: Negative for arthralgias  Skin: Negative for rash  Neurological: Negative for headaches           Current Outpatient Medications   Medication Sig Dispense Refill   • azithromycin (ZITHROMAX) 250 mg tablet 2 tabs PO day 1, then 1 tab PO days 2-5 6 tablet 0   • cholecalciferol (VITAMIN D3) 1,000 units tablet Take 1 tablet (1,000 Units total) by mouth daily 30 tablet 0   • clobetasol (TEMOVATE) 0 05 % cream Apply topically 2 (two) times a day as needed     • clonazePAM (KlonoPIN) 0 5 mg tablet take 1 tablet by mouth three times a day if needed for anxiety 90 tablet 0   • diclofenac sodium (VOLTAREN) 1 % Place on the skin     • docusate sodium (COLACE) 100 mg capsule Take 1 capsule (100 mg total) by mouth 2 (two) times a day as needed for constipation 60 capsule 0   • Misc Natural Products (ELDERBERRY ZINC/VIT C/IMMUNE MT) Apply to the mouth or throat     • Omega-3 Fatty Acids (fish oil) 1,000 mg Take 1,000 mg by mouth daily     • polyethylene glycol (MIRALAX) 17 g packet Take 17 g by mouth daily (Patient taking differently: Take 17 g by mouth if needed) 14 each 0   • RESTASIS 0 05 % ophthalmic emulsion Administer to both eyes every 12 (twelve) hours    0   • sertraline (ZOLOFT) 100 mg tablet Take 1 tablet (100 mg total) by mouth daily 90 tablet 1   • verapamil (VERELAN PM) 120 MG 24 hr capsule Take 1 capsule (120 mg total) by mouth daily 90 capsule 1     No current facility-administered medications for this visit  Objective:    /80   Pulse 64   Temp (!) 96 °F (35 6 °C)   Resp 14   Ht 5' 6" (1 676 m)   Wt 94 3 kg (208 lb)   BMI 33 57 kg/m²        Physical Exam  Vitals and nursing note reviewed  Constitutional:       Appearance: Normal appearance  She is well-developed  HENT:      Right Ear: Tympanic membrane normal       Left Ear: Tympanic membrane normal       Mouth/Throat:      Pharynx: Oropharyngeal exudate and posterior oropharyngeal erythema present  Cardiovascular:      Rate and Rhythm: Normal rate and regular rhythm  Heart sounds: Normal heart sounds  No murmur heard  Pulmonary:      Effort: Pulmonary effort is normal       Breath sounds: Normal breath sounds     Lymphadenopathy:      Cervical: No cervical adenopathy  Skin:     General: Skin is warm and dry  Neurological:      Mental Status: She is alert     Psychiatric:         Mood and Affect: Mood normal          Behavior: Behavior normal                 Thurmond Pain, CRNP

## 2023-01-17 ENCOUNTER — OFFICE VISIT (OUTPATIENT)
Dept: OBGYN CLINIC | Facility: CLINIC | Age: 58
End: 2023-01-17

## 2023-01-17 VITALS
HEIGHT: 66 IN | WEIGHT: 208 LBS | HEART RATE: 94 BPM | DIASTOLIC BLOOD PRESSURE: 95 MMHG | SYSTOLIC BLOOD PRESSURE: 137 MMHG | BODY MASS INDEX: 33.43 KG/M2

## 2023-01-17 DIAGNOSIS — M77.51 RETROCALCANEAL BURSITIS (BACK OF HEEL), RIGHT: ICD-10-CM

## 2023-01-17 DIAGNOSIS — M92.61 HAGLUND'S DEFORMITY, RIGHT: ICD-10-CM

## 2023-01-17 DIAGNOSIS — M76.61 TENDONITIS, ACHILLES, RIGHT: ICD-10-CM

## 2023-01-17 DIAGNOSIS — S92.034A CLOSED NONDISPLACED AVULSION FRACTURE OF TUBEROSITY OF RIGHT CALCANEUS, INITIAL ENCOUNTER: Primary | ICD-10-CM

## 2023-01-17 RX ORDER — CHLORHEXIDINE GLUCONATE 4 G/100ML
SOLUTION TOPICAL DAILY PRN
OUTPATIENT
Start: 2023-01-17

## 2023-01-17 RX ORDER — CHLORHEXIDINE GLUCONATE 0.12 MG/ML
15 RINSE ORAL ONCE
OUTPATIENT
Start: 2023-01-17 | End: 2023-01-17

## 2023-01-17 NOTE — PATIENT INSTRUCTIONS
SIDDHARTHA Houston  Attending, [address] [place] [address] Office Phone: [phone] ? Fax: [phone]  [facility] Office Phone: [phone] ? [facility]:[phone]    : Mathieu Hernandez) [place], [place]     Surgery Coordinators [facility]: Prudence Jamison, [phone]  Irina Lazaroveland, [phone]  Surgery Coordinator Teresa:  Nessa Cutler, [address] [facility], [phone]  www [email or web address]   PRE-OPERATIVE AND POST-OPERATIVE INSTRUCTIONS    General Information:  Your surgery is with Dr Lois Betancourt  Dates can change (although rare) depending on emergencies  Typical post operative visits are at the following intervals:  3 weeks post surgery(except 1 week for bunions and wound monitoring), 6 weeks post surgery, 3 months post surgery, 6 months post surgery, and then on a yearly basis  However, this may change based on Dr Glover Devoid recommendation  #1 post-operative rule for foot/ankle surgery:  ONCE YOU ARE OUT OF YOUR CAST AND/OR REMOVABLE BOOT, SWELLING MAY PERSIST FOR MANY MONTHS  YOU MIGHT ALSO EXPERIENCE A BLUISH DISCOLORATION OF YOUR LEG  THIS IS NORMAL AND PART OF THE USUAL POSTOPERATIVE EXPERIENCE  SMOKING:  Smoking results in incomplete healing of fractures (broken bones) and joints that my have been fused  Smoking and nicotine also prevents the growth of bone into ankle replacements and bone healing  It also slows the healing of muscles and skin (soft tissue)  Therefore, please do not have surgery if you continue to smoke  We reserve the right to cancel your surgery if we suspect that you are smoking  DO NOT use nicorette gum or other patches  Please find an alternative method to quit smoking before your surgery  Pre-Operative Information:  Surgery date and preoperative visits:   If you have medical problems, such as an abnormal EKG, history of BLOOD CLOT, ANEURYSM, and any other heart condition, please inform us so that we can get your medical clearance several weeks before the surgery  Please bring any important medical information, such as an EKG, chest x-ray, or echocardiogram, with you to ensure that your surgery will not be delayed  If needed, you will receive your preoperative appointments in the mail or by phone from our scheduling office  The location of the preoperative appointment will be given to you also  You may not eat after midnight the night before surgery  If you do, your surgery will be cancelled  You will receive a phone call from your surgery center the day before your surgery (if your surgery is on a Monday, you will get a call the Friday before)  If you do not hear from someone by 4pm the day before your surgery, please call the Surgical coordinator (number above) to notify us  Start taking Vitamin D3 4000 units per day and Calcium 1200mg per day immediately  You will continue this until your 3 month post-op visit  These are over the counter and available at all pharmacies and supermarkets  FOR THOSE HAVING SURGERY AT [address] [place] [address] Avenue WILL NEED CRUTCHES OR A ROLLING WALKER AFTER SURGERY, ASK FOR A PRESCRIPTION FOR THIS FROM OUR OFFICE TODAY  THIS CANNOT BE HANDLED THE DAY OF SURGERY AS [facility] DOES NOT STOCK THESE  Because bacterial can often enter any defect in the skin, it is important to avoid any cuts before surgery  Any breaks in the skin on the leg will often result in your surgery being postponed  Please avoid going on a very long walk the day prior to surgery, or doing other activities that could lead to irritation of the skin, including yard work, extra athletic activity, or shaving  This could result in surgery cancellation  You MUST be fasting the day of your surgery  Therefore, please do not consume any foot or beverage after midnight the night before surgery  The morning of surgery you may take your usual medications with a sip of water  It is important not to take anti-inflammatory medication like Ibuprofen, Motrin, Naproxen (Aleve), or Aspirin 7-10 days before surgery because they will make you bleed more than usual   Vitamin, E, Plavix and Coumadin also have the same effect  Stop Aspirin and Vitamin E two weeks before surgery  YOUR MEDICAL DOCTOR SHOULD TELL YOU WHEN TO STOP COUMADIN OR PLAVIX  If your surgery involves any bone healing, please do not take anti-inflammatories for at least 6 weeks after surgery  This can impede bone healing (ibuprofen, Aleve, Relafen, iodine)  Tylenol is fine to take  PREOPERATIVE BATHING INSTRUCTIONS:    Before your surgery, bathe with Hibiclens (4% Chlorhexidene) as instructed below  This skin cleanser will help reduce the bacteria on your skin before surgery  To avoid irritating your eyes, do not apply Hibiclens above the level of your neck  On the evening before AND the morning of surgery, bathe your entire body except the face and scalp, then rinse freely  DO NOT apply to your face or scalp, as Hibiclens can irritate your eyes  Purchasing information:   Hibiclens is available without a prescription at [facility]  ADDITIONAL INSTRUCTIONS:  PATIENTS HAVING FOOT/ANKLE SURGERY     In preparation for your upcoming surgery, we kindly request and advise the following:  Notify our office if you are taking any of the following:  Coumadin (warfarin):  Persantine (dipyridamole); Pletal (cilostazol); Plavix (clopidogrel); Ticlid (ticlopidine); Agrylin (anagrelide); Aggrenox (dipyridamole and aspirin) or other blood thinners,  In addition, stop taking Vitamin E and herbal supplements  Do not schedule any elective dental work for at least 6 months after surgery  If you had an ankle replacement, you will need to take antibiotics before any future dental procedures   Your dentist or our office can prescribe these for you  1000mg of Amoxicillin 1 hour prior to any dental procedure is the recommended dosing  THREE RULES:    After surgery you will most likely be given the instructions “KEEP YOUR TOES ABOVE YOUR NOSE ”  This means that you MUST have your feet elevated higher than your heart  Keeping your toes above your nose helps to heal the muscles and skin (soft tissues) by reducing swelling in your leg  This position also helps to prevent infection, and is very important in avoiding deep venous thrombosis (blood clots)  In order to keep the blood circulating in your legs and in order to avoid deep vein   thrombosis (blood clots), we ask patients to GET UP ONCE AN HOUR during the day  This means you should at least cross the room and come back  It does not mean you have to be up for long periods of time  In most cases we will not have people immediately put any weight on their operated part  This is important to prevent loosening of metal or other devices holding the bones together  It also prevents irritation of the soft tissues which can lead to prolonged healing  When we say get up once an hour, please walk, hop or move with an assisted device  This is important! Do not do any excessive walking during the first few days after surgery  Recovering from surgery is a full-time task for the patient  Postoperative care is important to avoid irritating the skin incision, which can lead to infection  Please do not plan activities or go out of town for several weeks after surgery  If you are unsure about your future activities, please schedule surgery only when you know it is acceptable for you  Scheduling surgery and then canceling the date, prevents other people from having surgery on that date as it takes time to line everything up effectively  If you cancel your surgery the week of your planned surgery, we reserve the right to cancel all future surgical procedures      THE DAY OF SURGERY:    Arrival to the hospital or outpatient surgical center on time is imperative  If you arrive late, then your surgery will be cancelled  You MUST have a family member/friend bring you, stay with you throughout the DURATION of your surgery, and drive you home  You MUST be fasting the day of your surgery  Therefore, do not consume any food or beverage after midnight the night before surgery  At your pre-operative visit with the anesthesia staff, or during your phone screen, a nurse will instruct you what medications you will need to take the day of surgery  MAKE SURE THAT THE PHARMACY LISTED IN THE ELECTRONIC MEDICAL RECORD (EPIC) IS YOUR PREFERRED PHARMACY  For example, if you are staying with family or a friend, and will not be near your preferred pharmacy, YOU MUST, tell the nurses checking you in the day of surgery so that this can be changed in the system  If your prescriptions are sent to a pharmacy, this cannot be changed  AFTER YOUR SURGERY:  Bleeding through the bandage almost always occurs  Do not let this alarm you  Simply add more gauze or a towel, call us, and come in for a dressing change  If you think it is excessive, contact us immediately or go to the local emergency room  Do not get the bandage wet  Showering is possible with plastic protectors  Be very careful, as the bathroom can be wet and slippery  If you do get your dressing wet, it should be changed immediately  Please contact us  ONCE YOUR ARE OUT OF YOUR CAST AND/OR REMOVABLE BOOT, SWELLING MAY PERSIST FOR MANY MONTHS  YOU MIGHT ALSO EXPERIENCE A BLUISH DISCOLORATION OF YOUR LEG  THIS IS NORMAL AND PART OF THE USUAL POSTOPERATIVE EXPERIENCE  WEARING COMPRESSION HOSE (ELASTIC STOCKINGS) CAN HELP AVOID SOME OF THIS SWELLING  DRESSING:   The purpose of the surgical dressing is to keep your wound and the surgical site protected from the environment    Most dressings contain splints, which help to hold your foot and ankle in a corrected position, and also allow the surgical site to heal properly  Dressings will remain in place and undisturbed until the first postop visit  If you have a drain in place, this will need to be removed in 1-3 days after surgery  The time for the drain to be pulled will be written on your discharge instruction sheet  CAST  INSTRUCTIONS:  You may or may not get a cast following surgery  If you do, pay close attention to the following:    After application of a splint or cast, it is very important to elevate your leg for 24 to 72 hours  The injured area should be elevated well above the heart  Remember “Toes above your Nose”  Rest and elevation greatly reduce pain and speed the healing process by minimizing early swelling  CALL YOUR DOCTORS OFFICE OR VISIT LOCATION EMERGENCY ROOM IF YOU HAVE ANY OF THE FOLLOWING:    Significant increased pain, which may be caused by swelling, and the feeling that the splint or cast is too tight  Numbness and tingling in your hand or foot, which may be caused by too much pressure on the nerves  Burning and stinging, which may be caused by too much pressure on the skin  Excessive swelling below the cast, which may mean the cast is slowing your blood circulation  Loss of active movement of toes, which request an urgent evaluation  Loss of “capillary refill”  Pinch the tip of toes and reymundo the skin  Release pressure and if the skin does not return pink then call the office immediately  DO NOT GET YOUR CAST WET  Bacteria thrive in moist dark areas  We do not want this  If your cast becomes wet, return to the office and we will apply another one  PAIN AFTER SURGERY:  Narcotic pain medication can and will depress your respiratory system if taken in excess  The goal of pain management with narcotics is to be comfortable not pain free  If you take enough narcotics to be pain free then you run the risk of stopping breathing    If this happens, call [phone] immediately! Pain in the heel is often  caused by pressure from the weight of your foot on the bed  Make sure your heel is suspended off the bed by keeping a pillow underneath your calf not your knee  Medications: You will be given narcotic pain medication  Do NOT drive while taking narcotic medications  Medications such as Darvocet, Percocet, Vicoden or Tylenol #3, also contain acetaminophen (Tylenol)  Do not take acetaminophen or Tylenol from home when taking theses medications  When you fill your prescription, you may ask the pharmacist if your pain medication has acetaminophen/Tylenol in it  It is okay to take Tylenol with Oxycontin/Oxycodone  Should you have pain after taking your prescription medication, ibuprophen (Motrin, Advil, and Alleve) is a common over the counter preparation and may often be taken with the prescription pain medication as long as you take them with food  These medications can irritate the stomach lining  Unless you are allergic to aspirin or currently taking a blood thinner, Dr Giorgi Mendoza patients are requested to take one 325 mg aspirin every 12 hours until you are back to walking normally after surgery (This can be up to 6 weeks)  Narcotic medications commonly cause nausea  Taking them with food will decrease this side effect  If you are having extreme nausea, please contact us for an alternative medication or for something that can be taken with this medication to decrease the nausea  Also, narcotic medications frequently cause constipation  An increase of fiber, fruits and vegetables in your diet may alleviate this problem, or if necessary, you may use an over-the-counter medication such as senekot, colace, or Fibercon for constipation problems  You should resume all medications you were taking prior to the surgery unless otherwise specified  Activity:   Because of your recent foot surgery, your activity level will decrease   You will need to elevate your foot ABOVE the level of your heart for a minimum of four days  The length of time necessary for the swelling to go down, and for your wounds to heal properly depends greatly on your efforts here  Elevation is extremely important to avoid compromising the blood supply to your foot  Remember when your foot is down it will swell, which will increase pain and slow healing  Wiggle your toes frequently if possible  If you go home with a regional block, (a type of anesthesia) the foot and leg will be numb  Think of ways to get into your house and around the house until the block wears off  Keep in mind that it may be a legal issue if you drive while in a cast or splint, especially when the splint is on the right foot  You may call the [employer] to schedule a road test if you have adaptive equipment applied to your car  The amount of weight you are allowed to bear on your foot will be written on your discharge sheet filled out at the time of surgery  The following is an explanation of the possibilities:       [address] has the following policies when it comes to ELECTIVE surgery  No elective surgery requiring anesthesia until 7 weeks after a patient tested positive for COVID-19   No elective surgery requiring anesthesia until 3 months after a patient was hospitalized for COVID-19      Non-weight bearing: You are to put NO weight whatsoever on your foot  When using crutches or a walker, your foot should not touch the ground, except when you are standing  Then, it may rest on the ground  If you are to be non-weight bearing, and you are not compliant, you could compromise the surgery  Some of our patients have been requesting prescriptions for a roll-a-bout knee scooter  [employer] and other insurances have been denying these claims, and you may either have to rent one or pay out of pocket to purchase one    THIS SHOULD BE PURCHASED PRIOR TO THE SURGERY AND YOU SHOULD BRING IT WITH YOU THE DAY OF THE SURGERY TO AIDE IN GETTING FROM THE CAR INTO THE HOUSE AFTER SURGERY  [employer], [employer], [employer] are good brands but I recommend going to a dedicate shoe store (not [employer] or [employer] ) At these types of stores, they have experts that can fit you for shoes appropriate for your foot problem  Shoe choice is essential to solving/improving most types of foot pain  Even after a surgery, good shoes are necessary to keep the foot as comfortable as possible  [employer]  [address], [address] [address]  [facility] José Manuel, [address]  [phone]    [employer] [address] [facility], [address] [place], [address]  [address] [facility] [place]  [address], [address] [facility]    [employer]  [address] [facility], [address] [facility]  [phone]    [facility]  [address] [facility], [facility], [place] [address]  [phone]    [employer]  [address], [place], [address]    [address],[place] [address]  [address] [facility], [address]  [address] [facility], [address], [facility], [address] [place] [address]   [phone]

## 2023-01-17 NOTE — PROGRESS NOTES
SIDDHARTHA Marti  Attending, Orthopaedic Surgery  Foot and [address] [facility] [address] [facility]      ORTHOPAEDIC FOOT AND ANKLE CLINIC VISIT     Assessment:     Encounter Diagnosis   Name Primary? • Closed nondisplaced avulsion fracture of tuberosity of right calcaneus, initial encounter Yes            Plan:   · The patient verbalized understanding of exam findings and treatment plan  We engaged in the shared decision-making process and treatment options were discussed at length with the patient  Surgical and conservative management discussed today along with risks and benefits  · She is still having significant pain in her achilles insertion and is interested in surgery  · Consent signed today in clinic  · Followup 3 weeks postop    CONSENT FOR BONY PROCEDURES:   Patient understands that there is no guarantee that the surgery will relieve all of Her pain and also understands that there may be a prolonged course of protected weight-bearing status required which will restrict them from driving and other activities as discussed at today's visit  Patient recognizes that there are risks with surgery including bleeding, numbness, nerve irritation, wound complications, infection, continued pain, joint stiffness, malunion, nonunion, anesthetic complications, death, failure of procedure and possible need for further surgery  The patient understands that there is no guarantee that this surgery will relieve all of Her pain and symptoms  Patient understands that there is no guarantee that they will return to full function after the procedure  Patient has provided informed consent for the procedure  History of Present Illness:   Chief Complaint:   Chief Complaint   Patient presents with   • Right Ankle - Follow-up     Vj Salcedo is a 62 y o  female who is being seen in follow-up for Right insertional achilles tendinopathy  When we last saw she we recommended PT and heel lifts    Pain has not improved  Residual pain is localized at achilles insertions with minimal radiating and described as sharp and severe  Pain/symptom timing:  Worse during the day when active  Pain/symptom context:  Worse with activites and work  Pain/symptom modifying factors:  Rest makes better, activities make worse  Pain/symptom associated signs/symptoms: none    Prior treatment   · NSAIDsYes   · Injections No   · Bracing/Orthotics Yes    · Physical Therapy Yes     Orthopedic Surgical History:   See below    Past Medical, Surgical and Social History:  Past Medical History:  has a past medical history of Anxiety, Arthritis, Degenerative disc disease, cervical, Depression, and History of herniated intervertebral disc (10/19/2005)  Problem List: does not have any pertinent problems on file  Past Surgical History:  has a past surgical history that includes Appendectomy;  section; Foot surgery (Left, ); Knee surgery; Tubal ligation; Hysterectomy; and Vulvectomy  Family History: family history includes Anxiety disorder in her mother; Arthritis in her father and mother; Atrial fibrillation in her father; Breast cancer (age of onset: 79) in her maternal grandmother; Diabetes in her mother; Heart disease in her mother; No Known Problems in her brother and maternal aunt; Obesity in her sister  Social History:  reports that she has never smoked  She has never used smokeless tobacco  She reports current alcohol use of about 2 0 standard drinks per week  She reports that she does not use drugs  Current Medications: has a current medication list which includes the following prescription(s): azithromycin, cholecalciferol, clobetasol, clonazepam, diclofenac sodium, docusate sodium, misc natural products, fish oil, polyethylene glycol, restasis, sertraline, and verapamil  Allergies: is allergic to bee venom, meperidine, and penicillins       Review of Systems:  General- denies fever/chills  HEENT- denies hearing loss or sore throat  Eyes- denies eye pain or visual disturbances, denies red eyes  Respiratory- denies cough or SOB  Cardio- denies chest pain or palpitations  GI- denies abdominal pain  Endocrine- denies urinary frequency  Urinary- denies pain with urination  Musculoskeletal- Negative except noted above  Skin- denies rashes or wounds  Neurological- denies dizziness or headache  Psychiatric- denies anxiety or difficulty concentrating    Physical Exam:   /95 (BP Location: Right arm, Patient Position: Sitting, Cuff Size: Adult)   Pulse 94   Ht 5' 6" (1 676 m)   Wt 94 3 kg (208 lb)   BMI 33 57 kg/m²   General/Constitutional: No apparent distress: well-nourished and well developed  Eyes: normal ocular motion  Lymphatic: No appreciable lymphadenopathy  Respiratory: Non-labored breathing  Vascular: No edema, swelling or tenderness, except as noted in detailed exam   Integumentary: No impressive skin lesions present, except as noted in detailed exam   Neuro: No ataxia or tremors noted  Psych: Normal mood and affect, oriented to person, place and time  Appropriate affect  Musculoskeletal: Normal, except as noted in detailed exam and in HPI  Examination    Right    Gait Antalgic   Musculoskeletal Tender to palpation at achilles insertion    Skin Normal       Nails Normal    Range of Motion  20 degrees dorsiflexion, 30 degrees plantarflexion  Subtalar motion: normal    Stability Stable    Muscle Strength 5/5 tibialis anterior  5/5 gastrocnemius-soleus  5/5 posterior tibialis  5/5 peroneal/eversion strength  5/5 EHL  5/5 FHL    Neurologic Normal    Sensation  Intact to light touch throughout sural, saphenous, superficial peroneal, deep peroneal and medial/lateral plantar nerve distributions  [place]-Bety 5 07 filament (10g) testing deferred  Cardiovascular Brisk capillary refill < 2 seconds,intact DP and PT pulses    Special Tests None      Imaging Studies:   No new imaging        James R Lachman, MD  Foot & Ankle Surgery   Department of [address] [place] [address]      I personally performed the service  Joni Bulla Lachman, MD

## 2023-02-27 NOTE — PRE-PROCEDURE INSTRUCTIONS
Pre-Surgery Instructions:   Medication Instructions   • Calcium 200 MG TABS continue as prescribed excluding DOS   • cholecalciferol (VITAMIN D3) 1,000 units tablet Avoid 1 week prior to surgery    • clobetasol (TEMOVATE) 0 05 % cream continue as prescribed excluding DOS   • clonazePAM (KlonoPIN) 0 5 mg tablet Continue to take as prescribed including DOS with a small sip of water, unless usually taken at night   • diclofenac sodium (VOLTAREN) 1 % Hold for 3 days   • docusate sodium (COLACE) 100 mg capsule Continue to take as prescribed including DOS with a small sip of water, unless usually taken at night   • Misc Natural Products (ELDERBERRY ZINC/VIT C/IMMUNE MT) Avoid 1 week prior to surgery    • Omega-3 Fatty Acids (fish oil) 1,000 mg Avoid 1 week prior to surgery    • polyethylene glycol (MIRALAX) 17 g packet continue as prescribed excluding DOS   • RESTASIS 0 05 % ophthalmic emulsion Continue as prescribed   • sertraline (ZOLOFT) 100 mg tablet Continue to take as prescribed including DOS with a small sip of water, unless usually taken at night   • verapamil (VERELAN PM) 120 MG 24 hr capsule Continue to take as prescribed including DOS with a small sip of water, unless usually taken at night    Avoid non-prescribed ASPIRIN, OTC vitamins and NSAIDS prior to surgery  Tylenol okay PRN  Continue scheduled medications excluding DOS  Avoid smoking prior to Surgery   Avoid alcohol, illicit drugs and marijuana for 24 hours prior to DOS  NPO instructions given: no food, water or anything else by mouth after midnight prior to surgery  Shower the night before and the morning of surgery using CHG wash or antibacterial soap if CHG is not indicated  Avoid shaving for 24 hours prior to DOS  Avoid using lotions, powders, oils, makeup, hair products, false eyelashes, etc  DOS  Patient given up to date visitor guidelines    A ride home after surgery is needed- failure to have a ride can result in cancellation  Remove jewelry and not to bring valuables DOS  Dentures and contact lenses will have to be removed for surgery  Patient understands he or she will receive a call the afternoon before surgery regarding an arrival time  If you have any changes in your health before DOS please call the surgeon's office  Patient verbalized understanding of all instructions

## 2023-03-02 ENCOUNTER — ANESTHESIA (OUTPATIENT)
Dept: PERIOP | Facility: AMBULARY SURGERY CENTER | Age: 58
End: 2023-03-02

## 2023-03-02 ENCOUNTER — ANESTHESIA EVENT (OUTPATIENT)
Dept: PERIOP | Facility: AMBULARY SURGERY CENTER | Age: 58
End: 2023-03-02

## 2023-03-02 ENCOUNTER — HOSPITAL ENCOUNTER (OUTPATIENT)
Facility: AMBULARY SURGERY CENTER | Age: 58
Setting detail: OUTPATIENT SURGERY
Discharge: HOME | End: 2023-03-02
Attending: ORTHOPAEDIC SURGERY | Admitting: ORTHOPAEDIC SURGERY

## 2023-03-02 VITALS
WEIGHT: 210 LBS | SYSTOLIC BLOOD PRESSURE: 126 MMHG | TEMPERATURE: 97 F | RESPIRATION RATE: 16 BRPM | BODY MASS INDEX: 33.75 KG/M2 | HEIGHT: 66 IN | DIASTOLIC BLOOD PRESSURE: 81 MMHG | HEART RATE: 82 BPM | OXYGEN SATURATION: 97 %

## 2023-03-02 DIAGNOSIS — M77.51 RETROCALCANEAL BURSITIS (BACK OF HEEL), RIGHT: ICD-10-CM

## 2023-03-02 DIAGNOSIS — M92.61 HAGLUND'S DEFORMITY, RIGHT: Primary | ICD-10-CM

## 2023-03-02 DIAGNOSIS — M76.60 INSERTIONAL ACHILLES TENDINOPATHY: ICD-10-CM

## 2023-03-02 PROBLEM — E66.9 OBESITY: Status: ACTIVE | Noted: 2020-01-20

## 2023-03-02 DEVICE — 5.5MM STRYKER REELX STT ANCHOR
Type: IMPLANTABLE DEVICE | Site: FOOT | Status: FUNCTIONAL
Brand: REELX

## 2023-03-02 DEVICE — SONICANCHOR KIT
Type: IMPLANTABLE DEVICE | Site: FOOT | Status: FUNCTIONAL
Brand: SONICANCHOR

## 2023-03-02 RX ORDER — VANCOMYCIN HYDROCHLORIDE 1 G/20ML
INJECTION, POWDER, LYOPHILIZED, FOR SOLUTION INTRAVENOUS AS NEEDED
Status: DISCONTINUED | OUTPATIENT
Start: 2023-03-02 | End: 2023-03-02 | Stop reason: HOSPADM

## 2023-03-02 RX ORDER — LIDOCAINE HYDROCHLORIDE 10 MG/ML
INJECTION, SOLUTION EPIDURAL; INFILTRATION; INTRACAUDAL; PERINEURAL AS NEEDED
Status: DISCONTINUED | OUTPATIENT
Start: 2023-03-02 | End: 2023-03-02

## 2023-03-02 RX ORDER — FENTANYL CITRATE 50 UG/ML
INJECTION, SOLUTION INTRAMUSCULAR; INTRAVENOUS AS NEEDED
Status: DISCONTINUED | OUTPATIENT
Start: 2023-03-02 | End: 2023-03-02

## 2023-03-02 RX ORDER — SODIUM CHLORIDE, SODIUM LACTATE, POTASSIUM CHLORIDE, CALCIUM CHLORIDE 600; 310; 30; 20 MG/100ML; MG/100ML; MG/100ML; MG/100ML
INJECTION, SOLUTION INTRAVENOUS CONTINUOUS PRN
Status: DISCONTINUED | OUTPATIENT
Start: 2023-03-02 | End: 2023-03-02

## 2023-03-02 RX ORDER — DEXAMETHASONE SODIUM PHOSPHATE 10 MG/ML
INJECTION, SOLUTION INTRAMUSCULAR; INTRAVENOUS AS NEEDED
Status: DISCONTINUED | OUTPATIENT
Start: 2023-03-02 | End: 2023-03-02

## 2023-03-02 RX ORDER — ASPIRIN 325 MG
325 TABLET, DELAYED RELEASE (ENTERIC COATED) ORAL 2 TIMES DAILY
Qty: 84 TABLET | Refills: 0 | Status: SHIPPED | OUTPATIENT
Start: 2023-03-02 | End: 2023-04-13

## 2023-03-02 RX ORDER — ONDANSETRON 2 MG/ML
4 INJECTION INTRAMUSCULAR; INTRAVENOUS ONCE AS NEEDED
Status: DISCONTINUED | OUTPATIENT
Start: 2023-03-02 | End: 2023-03-02 | Stop reason: HOSPADM

## 2023-03-02 RX ORDER — SODIUM CHLORIDE, SODIUM LACTATE, POTASSIUM CHLORIDE, CALCIUM CHLORIDE 600; 310; 30; 20 MG/100ML; MG/100ML; MG/100ML; MG/100ML
20 INJECTION, SOLUTION INTRAVENOUS CONTINUOUS
Status: CANCELLED | OUTPATIENT
Start: 2023-03-02

## 2023-03-02 RX ORDER — CHLORHEXIDINE GLUCONATE 4 G/100ML
SOLUTION TOPICAL DAILY PRN
Status: DISCONTINUED | OUTPATIENT
Start: 2023-03-02 | End: 2023-03-02 | Stop reason: HOSPADM

## 2023-03-02 RX ORDER — ONDANSETRON 4 MG/1
4 TABLET, FILM COATED ORAL EVERY 8 HOURS PRN
Qty: 30 TABLET | Refills: 0 | Status: SHIPPED | OUTPATIENT
Start: 2023-03-02

## 2023-03-02 RX ORDER — MAGNESIUM HYDROXIDE 1200 MG/15ML
LIQUID ORAL AS NEEDED
Status: DISCONTINUED | OUTPATIENT
Start: 2023-03-02 | End: 2023-03-02 | Stop reason: HOSPADM

## 2023-03-02 RX ORDER — ROCURONIUM BROMIDE 10 MG/ML
INJECTION, SOLUTION INTRAVENOUS AS NEEDED
Status: DISCONTINUED | OUTPATIENT
Start: 2023-03-02 | End: 2023-03-02

## 2023-03-02 RX ORDER — SODIUM CHLORIDE, SODIUM LACTATE, POTASSIUM CHLORIDE, CALCIUM CHLORIDE 600; 310; 30; 20 MG/100ML; MG/100ML; MG/100ML; MG/100ML
125 INJECTION, SOLUTION INTRAVENOUS CONTINUOUS
Status: DISCONTINUED | OUTPATIENT
Start: 2023-03-02 | End: 2023-03-02 | Stop reason: HOSPADM

## 2023-03-02 RX ORDER — CHLORHEXIDINE GLUCONATE 0.12 MG/ML
15 RINSE ORAL ONCE
Status: DISCONTINUED | OUTPATIENT
Start: 2023-03-02 | End: 2023-03-02 | Stop reason: HOSPADM

## 2023-03-02 RX ORDER — MIDAZOLAM HYDROCHLORIDE 2 MG/2ML
INJECTION, SOLUTION INTRAMUSCULAR; INTRAVENOUS AS NEEDED
Status: DISCONTINUED | OUTPATIENT
Start: 2023-03-02 | End: 2023-03-02

## 2023-03-02 RX ORDER — HYDROMORPHONE HCL/PF 1 MG/ML
0.4 SYRINGE (ML) INJECTION
Status: DISCONTINUED | OUTPATIENT
Start: 2023-03-02 | End: 2023-03-02 | Stop reason: HOSPADM

## 2023-03-02 RX ORDER — BUPIVACAINE HYDROCHLORIDE 2.5 MG/ML
INJECTION, SOLUTION EPIDURAL; INFILTRATION; INTRACAUDAL AS NEEDED
Status: DISCONTINUED | OUTPATIENT
Start: 2023-03-02 | End: 2023-03-02

## 2023-03-02 RX ORDER — OXYCODONE HYDROCHLORIDE 5 MG/1
5 TABLET ORAL ONCE AS NEEDED
Status: DISCONTINUED | OUTPATIENT
Start: 2023-03-02 | End: 2023-03-02 | Stop reason: HOSPADM

## 2023-03-02 RX ORDER — OXYCODONE HYDROCHLORIDE 5 MG/1
5 TABLET ORAL EVERY 4 HOURS PRN
Qty: 30 TABLET | Refills: 0 | Status: SHIPPED | OUTPATIENT
Start: 2023-03-02 | End: 2023-03-12

## 2023-03-02 RX ORDER — CLINDAMYCIN PHOSPHATE 900 MG/50ML
900 INJECTION INTRAVENOUS ONCE
Status: COMPLETED | OUTPATIENT
Start: 2023-03-02 | End: 2023-03-02

## 2023-03-02 RX ORDER — PROPOFOL 10 MG/ML
INJECTION, EMULSION INTRAVENOUS AS NEEDED
Status: DISCONTINUED | OUTPATIENT
Start: 2023-03-02 | End: 2023-03-02

## 2023-03-02 RX ADMIN — SODIUM CHLORIDE, SODIUM LACTATE, POTASSIUM CHLORIDE, AND CALCIUM CHLORIDE: .6; .31; .03; .02 INJECTION, SOLUTION INTRAVENOUS at 08:48

## 2023-03-02 RX ADMIN — BUPIVACAINE HYDROCHLORIDE 10 ML: 2.5 INJECTION, SOLUTION EPIDURAL; INFILTRATION; INTRACAUDAL at 07:58

## 2023-03-02 RX ADMIN — MIDAZOLAM HYDROCHLORIDE 2 MG: 1 INJECTION, SOLUTION INTRAMUSCULAR; INTRAVENOUS at 07:51

## 2023-03-02 RX ADMIN — DEXAMETHASONE SODIUM PHOSPHATE 10 MG: 10 INJECTION, SOLUTION INTRAMUSCULAR; INTRAVENOUS at 08:48

## 2023-03-02 RX ADMIN — FENTANYL CITRATE 50 MCG: 50 INJECTION, SOLUTION INTRAMUSCULAR; INTRAVENOUS at 07:51

## 2023-03-02 RX ADMIN — LIDOCAINE HYDROCHLORIDE 50 MG: 10 INJECTION, SOLUTION EPIDURAL; INFILTRATION; INTRACAUDAL at 08:48

## 2023-03-02 RX ADMIN — PROPOFOL 160 MG: 10 INJECTION, EMULSION INTRAVENOUS at 08:48

## 2023-03-02 RX ADMIN — ROCURONIUM BROMIDE 30 MG: 10 INJECTION, SOLUTION INTRAVENOUS at 08:48

## 2023-03-02 RX ADMIN — SUGAMMADEX 100 MG: 100 INJECTION, SOLUTION INTRAVENOUS at 09:30

## 2023-03-02 RX ADMIN — CLINDAMYCIN PHOSPHATE 900 MG: 900 INJECTION, SOLUTION INTRAVENOUS at 08:51

## 2023-03-02 RX ADMIN — FENTANYL CITRATE 50 MCG: 50 INJECTION, SOLUTION INTRAMUSCULAR; INTRAVENOUS at 08:48

## 2023-03-02 RX ADMIN — BUPIVACAINE 15 ML: 13.3 INJECTION, SUSPENSION, LIPOSOMAL INFILTRATION at 07:58

## 2023-03-02 NOTE — H&P
March Severs, M D  Attending, Orthopaedic Surgery  Foot [facility] [address] [facility] [address] [facility]        ORTHOPAEDIC FOOT AND ANKLE H+P     Assessment:   Right insertional achilles tendinopathy           Plan:   · The patient verbalized understanding of exam findings and treatment plan  We engaged in the shared decision-making process and treatment options were discussed at length with the patient  Surgical and conservative management discussed today along with risks and benefits  · Plan for right Haglunds resection, retrocalcaneal bursectomy, achilles debridement and repair  · Consent in chart    CONSENT FOR BONY PROCEDURES:   Patient understands that there is no guarantee that the surgery will relieve all of Her pain and also understands that there may be a prolonged course of protected weight-bearing status required which will restrict them from driving and other activities as discussed at today's visit  Patient recognizes that there are risks with surgery including bleeding, numbness, nerve irritation, wound complications, infection, continued pain, joint stiffness, malunion, nonunion, anesthetic complications, death, failure of procedure and possible need for further surgery  The patient understands that there is no guarantee that this surgery will relieve all of Her pain and symptoms  Patient understands that there is no guarantee that they will return to full function after the procedure  Patient has provided informed consent for the procedure  History of Present Illness:   Chief Complaint: Right heel pain  Ronal Amador is a 62 y o  female who is being seen for right heel pain  Patient has failed all conservative treatment measures  Pain is localized at right achilles insertion with minimal radiating and described as sharp and severe  Patient denies numbness, tingling or radicular pain  Denies history of neuropathy    Patient does not smoke, does not have diabetes and does not take blood thinners  Patient denies family history of anesthesia complications and has not had any complications with anesthesia  Pain/symptom timing:  Worse during the day when active  Pain/symptom context:  Worse with activites and work  Pain/symptom modifying factors:  Rest makes better, activities make worse  Pain/symptom associated signs/symptoms: none    Prior treatment   · NSAIDsYes    · Injections No   · Bracing/Orthotics Yes   · Physical Therapy Yes     Orthopedic Surgical History:   See below    Past Medical, Surgical and Social History:  Past Medical History:  has a past medical history of Anxiety, Arthritis, Degenerative disc disease, cervical, Depression, History of herniated intervertebral disc (10/19/2005), and Hypertension  Problem List: does not have any pertinent problems on file  Past Surgical History:  has a past surgical history that includes Appendectomy;  section; Foot surgery (Left, ); Knee surgery (Right); Tubal ligation; Hysterectomy; and Vulvectomy  Family History: family history includes Anxiety disorder in her mother; Arthritis in her father and mother; Atrial fibrillation in her father; Breast cancer (age of onset: 79) in her maternal grandmother; Diabetes in her mother; Heart disease in her mother; No Known Problems in her brother and maternal aunt; Obesity in her sister  Social History:  reports that she has never smoked  She has never used smokeless tobacco  She reports current alcohol use of about 2 0 standard drinks per week  She reports that she does not use drugs  Current Medications: Scheduled Meds:  Continuous Infusions:No current facility-administered medications for this encounter  PRN Meds:  Allergies: is allergic to bee venom, meperidine, and penicillins       Review of Systems:  General- denies fever/chills  HEENT- denies hearing loss or sore throat  Eyes- denies eye pain or visual disturbances, denies red eyes  Respiratory- denies cough or SOB  Cardio- denies chest pain or palpitations  GI- denies abdominal pain  Endocrine- denies urinary frequency  Urinary- denies pain with urination  Musculoskeletal- Negative except noted above  Skin- denies rashes or wounds  Neurological- denies dizziness or headache  Psychiatric- denies anxiety or difficulty concentrating    Physical Exam:   There were no vitals taken for this visit  General/Constitutional: No apparent distress: well-nourished and well developed  Eyes: normal ocular motion  Cardio: RRR, Normal S1S2, No m/r/g  Lymphatic: No appreciable lymphadenopathy  Respiratory: Non-labored breathing, CTA b/l no w/c/r  Abdominal: Soft and nontender  Vascular: No edema, swelling or tenderness, except as noted in detailed exam   Integumentary: No impressive skin lesions present, except as noted in detailed exam   Neuro: No ataxia or tremors noted  Psych: Normal mood and affect, oriented to person, place and time  Appropriate affect  Musculoskeletal: Normal, except as noted in detailed exam and in HPI  Examination    Right    Gait Antalgic   Musculoskeletal Tender to palpation at achilles insertion    Skin Normal       Nails Normal    Range of Motion  20 degrees dorsiflexion, 30 degrees plantarflexion  Subtalar motion: normal    Stability Stable    Muscle Strength 5/5 tibialis anterior  5/5 gastrocnemius-soleus  5/5 posterior tibialis  5/5 peroneal/eversion strength  5/5 EHL  5/5 FHL    Neurologic Normal    Sensation Intact to light touch throughout sural, saphenous, superficial peroneal, deep peroneal and medial/lateral plantar nerve distributions  [place]-Bety 5 07 filament (10g) testing deferred  Cardiovascular Brisk capillary refill < 2 seconds,intact DP and PT pulses    Special Tests None      Imaging Studies:   None new James R Lachman, MD  Foot & Ankle Surgery   Department [address]      I personally performed the service      Cathy Burns  Lachman, MD

## 2023-03-02 NOTE — ANESTHESIA PREPROCEDURE EVALUATION
Procedure:  H[place] resection, retrocalcaneal bursectomy, achilles debridement and repair  (Right: Foot)    Relevant Problems   CARDIO   (+) Benign essential hypertension      GI/HEPATIC   (+) GERD (gastroesophageal reflux disease)      /RENAL   (+) Stage 3a chronic kidney disease      NEURO/PSYCH   (+) Depression   (+) Generalized anxiety disorder      Other   (+) Obesity        Physical Exam    Airway    Mallampati score: III  TM Distance: >3 FB  Neck ROM: full     Dental       Cardiovascular      Pulmonary      Other Findings        Anesthesia Plan  ASA Score- 2     Anesthesia Type- general with ASA Monitors  Additional Monitors:   Airway Plan: ETT  Comment: Right popliteal block with exparel  Plan Factors-    Chart reviewed  EKG reviewed  Existing labs reviewed  Patient summary reviewed  Induction- intravenous  Postoperative Plan- Plan for postoperative opioid use  Informed Consent- Anesthetic plan and risks discussed with patient  I personally reviewed this patient with the CRNA  Discussed and agreed on the Anesthesia Plan with the CRNA  Frutoso Spatz

## 2023-03-02 NOTE — ANESTHESIA POSTPROCEDURE EVALUATION
Post-Op Assessment Note    CV Status:  Stable  Pain Score: 0    Pain management: adequate     Mental Status:  Alert and awake   Hydration Status:  Euvolemic   PONV Controlled:  Controlled   Airway Patency:  Patent      Post Op Vitals Reviewed: Yes      Staff: Anesthesiologist         No notable events documented      BP   133/85   Temp   98 4   Pulse  88   Resp   14   SpO2   95 2l o2

## 2023-03-02 NOTE — ANESTHESIA PROCEDURE NOTES
PIP= normal MMG Peripheral Block    Patient location during procedure: holding area  Start time: 3/2/2023 7:58 AM  Reason for block: at surgeon's request and post-op pain management  Staffing  Performed: Anesthesiologist   Anesthesiologist: Lore Moser MD  Preanesthetic Checklist  Completed: patient identified, IV checked, site marked, risks and benefits discussed, surgical consent, monitors and equipment checked, pre-op evaluation and timeout performed  Peripheral Block  Patient position: supine  Prep: ChloraPrep  Patient monitoring: continuous pulse ox and frequent blood pressure checks  Block type: popliteal  Laterality: right  Injection technique: single-shot  Procedures: ultrasound guided, Ultrasound guidance required for the procedure to increase accuracy and safety of medication placement and decrease risk of complications    Ultrasound permanent image saved  Needle  Needle type: Stimuplex   Needle gauge: 22 G  Needle length: 5 cm  Needle localization: anatomical landmarks and ultrasound guidance  Test dose: negative  Assessment  Injection assessment: incremental injection, local visualized surrounding nerve on ultrasound, no paresthesia on injection and negative aspiration for heme  Paresthesia pain: none  Post-procedure:  site cleaned  patient tolerated the procedure well with no immediate complications

## 2023-03-02 NOTE — DISCHARGE INSTR - AVS FIRST PAGE
SIDDHARTHA Vásquez  Attending, [address] [place] [address] Office Phone: [phone] ? Fax: [phone]  [facility] Office Phone: [phone] ? IKN:[phone]    : Ovi Boateng, [address] [facility]     Surgery Coordinators Yessicadelia Calderóndaryn: Kathia Khan, [phone]  Maeve Mary Ann  [phone]  Surgery Coordinator John:  Marylee Stack Radha [address], [phone]  www [email or web address]   POST-OPERATIVE INSTRUCTIONS    General Information:  Typical post operative visits are at the following intervals:  2-3 weeks post surgery, 6 weeks post surgery, 3 months post surgery, 6 months post surgery, and then on a yearly basis  However, this may change based on Dr Elle Valadez recommendation  #1 post-operative rule for foot/ankle surgery:  ONCE YOU ARE OUT OF YOUR CAST AND/OR REMOVABLE BOOT, SWELLING MAY PERSIST FOR MANY MONTHS  YOU MIGHT ALSO EXPERIENCE A BLUISH DISCOLORATION OF YOUR LEG  THIS IS NORMAL AND PART OF THE USUAL POSTOPERATIVE EXPERIENCE  DO NOT WAIT UNTIL YOUR BLOCK WEARS OFF TO TAKE YOUR PAIN MEDICATION  IT TAKES A FEW DOSES OF THE PAIN MEDICATION TO REACH A THERAPEUTIC LEVEL  TAKE A TABLET PROACTIVELY BEFORE YOU HAVE ANY PAIN AND AGAIN 4 HOURS LATER SO WHEN THE BLOCK WEARS OFF, YOU ARE NOT CAUGHT OFF GUARD  SMOKING:  Smoking results in incomplete healing of fractures (broken bones) and joints that my have been fused  Smoking and nicotine also prevents the growth of bone into ankle replacements and bone healing  It also slows the healing of muscles and skin (soft tissue)  Therefore, please do not have surgery if you continue to smoke  We reserve the right to cancel your surgery if we suspect that you are smoking  DO NOT use nicorette gum or other patches    Please find an alternative method to quit smoking before your surgery and do not restart after surgery to allow for healing  THREE RULES:    After surgery you will most likely be given the instructions “KEEP YOUR TOES ABOVE YOUR NOSE ”  This means that you MUST have your feet elevated higher than your heart  Keeping your toes above your nose helps to heal the muscles and skin (soft tissues) by reducing swelling in your leg  This position also helps to prevent infection, and is very important in avoiding deep venous thrombosis (blood clots)  In order to keep the blood circulating in your legs and in order to avoid deep vein   thrombosis (blood clots), we ask patients to GET UP ONCE AN HOUR during the day  This means you should at least cross the room and come back  It does not mean you have to be up for long periods of time  In most cases we will not have people immediately put any weight on their operated part  This is important to prevent loosening of metal or other devices holding the bones together  It also prevents irritation of the soft tissues which can lead to prolonged healing  When we say get up once an hour, please walk, hop or move with an assisted device  This is important! Do not do any excessive walking during the first few days after surgery  Recovering from surgery is a full-time task for the patient  Postoperative care is important to avoid irritating the skin incision, which can lead to infection  Please do not plan activities or go out of town for several weeks after surgery  AFTER YOUR SURGERY:  Bleeding through the bandage almost always occurs  Do not let this alarm you  Simply overwrap with an ABD pad and Ace bandage (The nurses discharging your from the day of surgery will provide this )   If you think it is excessive, you can come in early for a dressing change (at around 1 week instead of 3 weeks postop )    Do not get the bandage wet  Showering is possible with plastic protectors     Be very careful, as the bathroom can be wet and slippery  If you do get your dressing wet, it should be changed immediately  Please contact us  ONCE YOUR ARE OUT OF YOUR CAST AND/OR REMOVABLE BOOT, SWELLING MAY PERSIST FOR MANY MONTHS  THERE WILL ALSO BE A BLUISH DISCOLORATION OF YOUR LEG FOR MONTHS  THIS IS NORMAL AND PART OF THE USUAL POSTOPERATIVE EXPERIENCE  WEARING COMPRESSION HOSE (ELASTIC STOCKINGS) CAN HELP AVOID SOME OF THIS SWELLING  Ice the area 20 minutes every hour once the nerve block wears off  If you are in a cast or a splint, you may need to leave the ice on longer than 20 minutes in order to feel any benefits  DRESSING:   The purpose of the surgical dressing is to keep your wound and the surgical site protected from the environment  Most dressings contain splints, which help to hold your foot and ankle in a corrected position, and also allow the surgical site to heal properly  If you have a drain in place, this will need to be removed in 1 day after surgery  The time for the drain to be pulled will be written on your discharge instruction sheet  CAST  INSTRUCTIONS:  You may or may not get a cast following surgery  If you do, pay close attention to the following:    After application of a splint or cast, it is very important to elevate your leg for 24 to 72 hours  The injured area should be elevated well above the heart  Remember “Toes above your Nose”  Rest and elevation greatly reduce pain and speed the healing process by minimizing early swelling      CALL YOUR DOCTORS OFFICE OR VISIT LOCATION EMERGENCY ROOM IF YOU HAVE ANY OF THE FOLLOWING:    Significant increased pain, which may be caused by swelling (Strict elevation will alleviate this)  Numbness and tingling in your hand or foot, which may be caused by too much pressure on the nerves (There is always some numbness after surgery due to nerve blocks)  Burning and stinging, which may be caused by too much pressure on the skin  Excessive swelling below the cast, which may mean the cast is slowing your blood circulation  Loss of active movement of toes, which request an urgent evaluation  Loss of “capillary refill”  Pinch the tip of toes and reymundo the skin  Release pressure and if the skin does not return pink then call the office immediately  DO NOT GET YOUR CAST WET  Bacteria thrive in moist dark areas  We do not want this  If your cast becomes wet, return to the office and we will apply another one  PAIN AFTER SURGERY:  Narcotic pain medication can and will depress your respiratory system if taken in excess  The goal of pain management with narcotics is to be comfortable not pain free  If you take enough narcotics to be pain free then you run the risk of stopping breathing  If this happens, call 911 immediately! Pain in the heel is often  caused by pressure from the weight of your foot on the bed  Make sure your heel is suspended off the bed by keeping a pillow underneath your calf not your knee  Medications: You will be given narcotic pain medication  Do NOT drive while taking narcotic medications  Medications such as Darvocet, Percocet, Vicoden or Tylenol #3, also contain acetaminophen (Tylenol)  Do not take acetaminophen or Tylenol from home when taking theses medications  When you fill your prescription, you may ask the pharmacist if your pain medication has acetaminophen/Tylenol in it  It is okay to take Tylenol with Oxycontin/Oxycodone  Unless you are allergic to aspirin or currently taking a blood thinner, Dr Lindquist Hams patients are requested to take one 325 mg aspirin every 12 hours until you are back to walking normally after surgery (This can be up to 6 weeks)  Ecotrin (Enteric-coated aspirin) is more sensitive to the stomach and we recommend purchasing this instead of regular aspirin to minimize the risk of stomach irritation  Narcotic medications commonly cause nausea  Taking them with food will decrease this side effect   If you are having extreme nausea, please contact us for an alternative medication or for something that can be taken with this medication to decrease the nausea  Also, narcotic medications frequently cause constipation  An increase of fiber, fruits and vegetables in your diet may alleviate this problem, or if necessary, you may use an over-the-counter medication such as senekot, colace, or Fibercon for constipation problems  You should resume all medications you were taking prior to the surgery unless otherwise specified  If you had fracture surgery, bony surgery like an osteotomy or fusion, or a surgery that requires bone healing, you are advised to take Vitamin D and Calcium to improve healing potential   Vitamin D3 4000 units/day and Calcium 1200mg/day  These are over the counter medications so please pick them up at the pharmacy when you are picking up your prescriptions  Activity:   Because of your recent foot surgery, your activity level will decrease  You will need to elevate your foot ABOVE the level of your heart for a minimum of four days  The length of time necessary for the swelling to go down, and for your wounds to heal properly depends greatly on your efforts here  Elevation is extremely important to avoid compromising the blood supply to your foot  Remember when your foot is down it will swell, which will increase pain and slow healing  Wiggle your toes frequently if possible  If you go home with a regional block, (a type of anesthesia) the foot and leg will be numb  Think of ways to get into your house and around the house until the block wears off  Keep in mind that it may be a legal issue if you drive while in a cast or splint, especially when the splint is on the right foot  You may call the Department of Motor Vehicles to schedule a road test if you have adaptive equipment applied to your car     The amount of weight you are allowed to bear on your foot will be written on your discharge sheet filled out at the time of surgery  The following is an explanation of the possibilities:     Non-weight bearing: You are to put NO weight whatsoever on your foot  When using crutches or a walker, your foot should not touch the ground, except when you are standing  Then, it may rest on the ground  If you are to be non-weight bearing, and you are not compliant, you could compromise the surgery  Some of our patients have been requesting prescriptions for a roll-a-bout knee scooter  [employer] and other insurances have been denying these claims, and you may either have to rent one or pay out of pocket to purchase one

## 2023-03-02 NOTE — OP NOTE
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]    Procedure(s):  Right - Haglunds resection  retrocalcaneal bursectomy  achilles debridement and repair  Specimen(s):  * No specimens in log *    Estimated Blood Loss:   Minimal    Drains:  * No LDAs found *    Anesthesia Type:   Choice    Operative Indications:  Tendonitis, Achilles, right [M76 61]  Retrocalcaneal bursitis (back of heel), right [M77 51]  Adrienne's deformity, 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]      Operative Findings:  Consistent with diagnosis    Complications:   None    Procedure and Technique:  1  Osteotomy/Exostectomy of calcaneus Haglunds deformity  2  Debridement and repair of achillen tendon  3  Retrocalcaneal bursectomy  4  Placement into short leg nonweightbearing plaster splint    An esmarch was used to exsanguinate the leg and the tourniquet was inflated to 250mm Hg  The patient was then placed prone with all bony prominences padded  See anesthesia documentation for tourniquet time  The patient was prepped and draped in usual sterile fashion  Attention was turned to the central heel  A longitudinal incision was made  Sharp dissection was taken through the skin and bovie electrocautery was used to control hemostasis  Scissor dissection was used for the deep dissection  Next, a midline incision was made in the achilles tendon and down to the calcaneus  It was "T'd" distally  Full thickness flaps were made    There was extensive degeneration of the tendon  This was removed  The bursa was debrided  Next, with careful protection, the calcaneus ostectomy was performed  The achilles was still attached medially and laterally  Next, the first two Kalen sonic anchors were inserted into the calcaneus by drilling, tapping and then passing the anchors  The suture was then passed through the tendon at the appropriate level to establish tension  Copious irrigation was used  We then took on limb from either side of the tendon and then passed the second (Reel-X anchors) anchor to establish the double row repair  We used one of the sutures in the anchor to repair the split in the achilles tendon in a running fashion  We established good tension in the tendon and the resting equinus was restored  We then closed the paratenon in a running fashion and then used 4-0 vicryl subcutaneously  We then put vancomycin powder into the wound bed  3-0 nylon was used to close the skin in vertical mattress fashion  The arthrex Jumpstart dressing was placed and the leg was abundantly padded  We then placed a plaster, nonweightbearing, posterior-U splint with the ankle in slight plantarflexion       I was present for the entire procedure    Patient Disposition:  PACU         SIGNATURE: Karie Newman MD  DATE: March 2, 2023  TIME: 8:44 AM

## 2023-03-16 DIAGNOSIS — I10 BENIGN ESSENTIAL HYPERTENSION: ICD-10-CM

## 2023-03-16 DIAGNOSIS — F41.1 GENERALIZED ANXIETY DISORDER: ICD-10-CM

## 2023-03-16 RX ORDER — VERAPAMIL HYDROCHLORIDE 120 MG/1
CAPSULE, EXTENDED RELEASE ORAL
Qty: 90 CAPSULE | Refills: 0 | Status: SHIPPED | OUTPATIENT
Start: 2023-03-16

## 2023-03-16 RX ORDER — SERTRALINE HYDROCHLORIDE 100 MG/1
TABLET, FILM COATED ORAL
Qty: 90 TABLET | Refills: 0 | Status: SHIPPED | OUTPATIENT
Start: 2023-03-16

## 2023-03-21 ENCOUNTER — OFFICE VISIT (OUTPATIENT)
Dept: OBGYN CLINIC | Facility: CLINIC | Age: 58
End: 2023-03-21

## 2023-03-21 VITALS
HEART RATE: 100 BPM | BODY MASS INDEX: 33.75 KG/M2 | SYSTOLIC BLOOD PRESSURE: 107 MMHG | WEIGHT: 210 LBS | HEIGHT: 66 IN | DIASTOLIC BLOOD PRESSURE: 75 MMHG

## 2023-03-21 DIAGNOSIS — S92.034A CLOSED NONDISPLACED AVULSION FRACTURE OF TUBEROSITY OF RIGHT CALCANEUS, INITIAL ENCOUNTER: ICD-10-CM

## 2023-03-21 DIAGNOSIS — M77.51 RETROCALCANEAL BURSITIS (BACK OF HEEL), RIGHT: ICD-10-CM

## 2023-03-21 DIAGNOSIS — M76.61 TENDONITIS, ACHILLES, RIGHT: Primary | ICD-10-CM

## 2023-03-21 DIAGNOSIS — M92.61 HAGLUND'S DEFORMITY OF RIGHT HEEL: ICD-10-CM

## 2023-03-21 NOTE — LETTER
March 21, 2023     Patient: Thee Wilson  YOB: 1965  Date of Visit: 3/21/2023      To Whom it May Concern:    Markus Miller is under my professional care  Damaris Valencia was seen in my office on 3/21/2023  Damaris Valencia is to remain out of work  Expected full recovery post operatively is 4-4 5 months    If you have any questions or concerns, please don't hesitate to call           Sincerely,          Antonietta Amador MD        CC: No Recipients

## 2023-03-21 NOTE — PATIENT INSTRUCTIONS
Weightbearing as tolerated in the CAM boot with 3 heel wedges for the next 1 week  Use crutches for balance until comfortable    In 1 week (3/28) remove one heel wedge so that there are two left in the boot  In 2 weeks (4/4) remove one heel wedge so that there is one left in the boot  In 3 weeks (4/11) remove one heel wedge so that your foot is flat to the ground  You will then spend two more weeks in the CAM boot and will be weightbearing as tolerated (Until 5/2)  You may then begin weaning your boot and transitioning to a sneaker (on 5/2)  It is important to do this gradually to avoid aggravating the healing process  5/2, you may come out of the boot into a sneaker for 2 hours  2  5/3, you may come out of the boot into a sneaker for 4 hours,  3  The next day, you may come out of the boot into a sneaker for 6 hours  4  Continue this (adding 2 hours per day) as you tolerate  For example, if you do 6 hours out of the boot into a sneaker and your foot swells more than usual at night and it is difficult to control the discomfort, do not advance to 8 hours the next day, stay at 6 hours until you are able to tolerate it  Elevation, Ice and tylenol and staying off of it at night will be important to aide in this transition out of the boot  Swelling and soreness are normal as you begin to do more with the injured leg     ---------------------------    Continue aspirin until completed  You may shower but do not soak in a pool/tub/ocean for another 4 weeks  Begin scar massage (pea sized amount of lotion massaging scar in a circular pattern for 5 minutes each day )  Compression stocking (knee high 20-30mm Hg pressure) to be worn at all times while awake  Begin PT immediately  No dorsiflexion past neutral in therapy

## 2023-03-21 NOTE — PROGRESS NOTES
SIDDHARTHA Yancey  Attending, Orthopaedic Surgery  Foot and Ankle  [facility]      ORTHOPAEDIC FOOT AND ANKLE POST-OP VISIT     Procedure:     Right Adrienne's resection with retrocalcaneal bursectomy and achilles debridement and repair       Date of surgery:   3/2/23      PLAN  1  Weightbearing Status- WBAT operative extremity  2  DVT prophylaxis- ASA 325mg BID  3  Continue to elevate 23 hrs/day getting up 1x per hour to prevent a blood clot  4  Pain control- OTC pain medication  5  RTC in 3 weeks  6  Xrays needed next visit - No    History of Present Illness:   Chief Complaint:   Chief Complaint   Patient presents with   • Right Ankle - Post-op     Jm Miller is a 62 y o  female who is being seen for 3 weeks post-operative visit for the above procedure  Pain is well controlled and the patient has successfully transitioned to OTC pain medicines  she is taking ASA 325mg BID for DVT prophylaxis  Patient has been NWB in a Splint      Review of Systems:  General- denies fever/chills  Respiratory- denies cough or SOB  Cardio- denies chest pain or palpitations  GI- denies abdominal pain  Musculoskeletal- Negative except noted above  Skin- denies rashes or wounds    Physical Exam:   /75 (BP Location: Right arm, Patient Position: Sitting, Cuff Size: Adult)   Pulse 100   Ht 5' 6" (1 676 m)   Wt 95 3 kg (210 lb)   BMI 33 89 kg/m²   General/Constitutional: No apparent distress: well-nourished and well developed  Eyes: normal ocular motion  Lymphatic: No appreciable lymphadenopathy  Respiratory: Non-labored breathing  Vascular: No edema, swelling or tenderness, except as noted in detailed exam   Integumentary: No impressive skin lesions present, except as noted in detailed exam   Neuro: No ataxia or tremors noted  Psych: Normal mood and affect, oriented to person, place and time  Appropriate affect    Musculoskeletal: Normal, except as noted in detailed exam and in HPI     Examination    right        Incision Clean, dry, intact  Sutures Removed this visit    Ecchymosis none    Swelling mild    Sensation Intact to light touch throughout sural, saphenous, superficial peroneal, deep peroneal and medial/lateral plantar nerve distributions  [place] 5 07 filament (10g) testing deferred  Cardiovascular Brisk capillary refill < 2 seconds,intact DP and PT pulses    Special Tests None      Imaging Studies:   No new images    Scribe Attestation    I,:  Luigi Persaud am acting as a scribe while in the presence of the attending physician :       I,:  Luis Alberto Hanks MD personally performed the services described in this documentation    as scribed in my presence :              Shelvy Auer Lachman, MD  Foot & Ankle Surgery   Department of [address] [place] [address]      I personally performed the service  Shelvy Auer Lachman, MD

## 2023-03-23 ENCOUNTER — TELEPHONE (OUTPATIENT)
Dept: OBGYN CLINIC | Facility: MEDICAL CENTER | Age: 58
End: 2023-03-23

## 2023-03-23 NOTE — TELEPHONE ENCOUNTER
Caller: Bita     Doctor: Dr Abdifatah Adnujar     Reason for call: The patient had sx on 3/2 with Dr Abdifatah Andujar  Stiches are out  She has some burning pain across the top of her foot below her toes   Calling to speak to someone to check if this a problem or if it is normal  Thank you      Call back#: [phone]

## 2023-03-23 NOTE — TELEPHONE ENCOUNTER
Called patient back and she states the burning pain to top of foot is intermittent in nature will be there for 10 min and then subside  No swelling or redness to the area  Has swelling back toward ankle  She is elevating toes above the nose since surgery  She is wearing the compression hose 24/7 under boot  The burning started prior to wearing compression stockings  Please advise

## 2023-03-27 ENCOUNTER — EVALUATION (OUTPATIENT)
Dept: PHYSICAL THERAPY | Facility: CLINIC | Age: 58
End: 2023-03-27

## 2023-03-27 DIAGNOSIS — M76.61 TENDONITIS, ACHILLES, RIGHT: Primary | ICD-10-CM

## 2023-03-27 NOTE — PROGRESS NOTES
PT Evaluation   Today's date: 3/27/2023  Patient name: Charlene Mark  : 1965  MRN: [id number]  Referring provider: Dali Miles MD  Dx:   Encounter Diagnosis     ICD-10-CM    1  Tendonitis, Achilles, right  M76 61 Ambulatory Referral to Physical Therapy          Assessment    Charlene Mark is a pleasant 62 y o  female who presents with a referring diagnosis of R achilles tendonitis and is post-op R achilles tendon surgery 3/2/23  The patient's greatest concern is healing from surgery and returning to normal function  The primary movement system diagnosis is hypomobility of the R ankle with nociceptive and neuropathic pain resulting in pathoanatomical symptoms of pain, impaired motion, decreased strength  The aforementioned impairments have limited the patient's ability to walk, negotiate stairs, stand  No further referral is neccessary at this time based upon examination results  Positive prognostic indicators include family support, motivation to improve, positive attitude and acuity of symptoms  Negative prognostic indicators include age and anxiety  Impairments: abnormal gait, abnormal or restricted ROM, activity intolerance, Impaired balance, Impaired physical strength, lacks appropriate HEP, pain with function and weight-bearing intolerance    Symptom Irritability: moderate    Problem List:  - Impaired strength   - Impaired motion  - Impaired balance       Patient education performed this session included: post-op contraindications / precautions, wound care, weight bearing status, home exercise program, plan of care, expected tissue healing time, prognosis and diagnosis, scar massage, compression and elevation    Patient verbalized good understanding of POC  Please contact me if you have any questions or recommendations  Thank you for the referral and the opportunity to share in [address] [place] [address]  Numbness and a slight shock feeling on top of foot that is intermittent   Pt ambulates with a RW and CAM boot on R with 3 wedges  Plan    Patient would benefit from: Skilled PT  Planned modality interventions: cryotherapy and TENS  Planned therapy interventions: activity modification, behavior modification, body mechanics training, functional ROM exercises, graded exercise, HEP, joint mobilization, manual therapy, Mobley taping, motor coordination training, neuromuscular re-education, patient education, postural training, strengthening, stretching, therapeutic activities and therapeutic exercises    Frequency: 1-2 x/wk  Duration in weeks: 12 weeks    Plan of Care beginning date: 3/27/2023  Plan of Care expiration date: 12 weeks - 6/19/2023  Treatment plan discussed with: patient      Goals    Short Term Goals (2 weeks):    1  Patient will be independent with basic HEP  2  Patient will report >50% reduction in pain  3  Patient will demonstrate >1/3 improvement in MMT grade as applicable  4  Patient will increase R ankle ROM to normalize gait for functional mobility  5  Patient will demonstrate improved sensation on superior aspect of distal foot for increased comfort during activity and rest    Long Term Goals (12 weeks):  1  Patient will be independent in a comprehensive home exercise program  2  Patient FOTO score will improve to 52/100  3  Patient will self-report >75% improvement in function  4  Patient will walk with minimal deviations to allow access to community and prepare for return to work/leisure  5   Patient will demonstrate improved ability to WB and decrease reliance on RW to promote improve functional mobility      History    History of Present Illness  Mechanism of injury: Post-op of R achilles tendon    Prior level of function: Independent    Progression: improved    Previous treatment: injection treatment, massage, medication and physical therapy  Current treatment: physical therapy    Patient goals for therapy: decrease edema, decrease pain, improve balance, increase motion, increase strength, independence with ADLs, return to sport, return to leisure activities and return to work    Pain   3/27/2023    Current 3/10    Best 3/10    Worst 5/10     Location: Inferior aspect of R calcaneus, superior aspect of foot (burning, shock)   Description: dull aching and throbbing  Aggravating factors: standing, walking, ascending stairs and descending stairs  Relieving factors: change in position, medications, rest and support    Patient Specific Functional Scale  0 is unable, 10 is no limitations  Activity 3/27/2023   Stairs 5/10   Standing 6/10   Walking 6/10     Social Support  Lives in: multiple-level home  Lives with: spouse      Physical Examination    Red Flag Screen  (+) age >47 years old and none      LE MMT   3/27/2023    LEFT RIGHT     Hip Flexion 4-/5 4-/5    Hip Abduction 4/5 4/5    Hip Adduction 4/5 4/5        Knee Flexion 4/5 4/5    Knee Extension 4-/5 4-/5       Ankle DF 4/5 3+/5    Ankle PF 4/5 3/5    Ankle Inversion 4/5 3+/5    Ankle Eversion 4/5 3+/5   Great Toe Extension 4/5 4/5     (*) = reproduction of patients reported pain      LE ROM   3/27/2023    LEFT RIGHT     Hip Flexion [facility]    Hip Extension [facility] WFL       Knee Flexion [facility] WFL    Knee Extension [facility] WFL       Ankle DF WFL -30 deg    Ankle PF WFL 55 deg    Ankle Inversion WFL 25 deg    Ankle Eversion WFL 20 deg     (*) = reproduction of patients reported pain    Sensation  Light touch: intact bilaterally, slightly impaired on the R L4, L5 dermatome    Observation      Edema   3/27/2023    LEFT RIGHT     Figure 8 55 cm 58 cm    Metatarsal heads 23 5 cm 25 cm     Functional Assessment  Stair Negotiation:   Ascend: non-reciprocal with bilateral arm support   Descend: non-reciprocal with bilateral arm support  Gait Assessment: antalgic, step to with RW             Insurance:  AMA/CMS Eval/ [facility] #/ Referral # Total units  Start date  Expiration date Extension  Visit limitation? PT only or  PT+OT? Co-Insurance   CMS [facility] AND [facility]) 3/27/2023 No auth needed     30 PCY PT  $15 copay                                                     POC Start Date POC Expiration Date Signed POC?   3/27/2023 12 weeks - 6/19/2023       Date               Units:  Used               Authed:  Remaining                  Precautions:   Past Medical History:   Diagnosis Date   • Anxiety    • Arthritis    • Degenerative disc disease, cervical     2 cervical and 2 lumbar, goes for spinal decompression therapy 2x monthly   • Depression    • History of herniated intervertebral disc 10/19/2005   • Hypertension        Patient provided verbal consent to treatment plan and recommended interventions      Date 3/27/2023        Visit Number IE        FOTO Tracking Intake Survey     Follow-up #1   Manual         Ankle mobs         Forefoot mobs                                    TherEx         Active POP         Ankle PROM         Ankle AROM         Ankle TB 4-way                                                      Neuro Re-Ed         Ankle alphabet         Fitter         BAPS         Foot intrinsics                                             TherAct         Patient education Discussed and reviewed HEP and POC                                            Gait Training         AD training                           Modalities         Ice

## 2023-03-31 ENCOUNTER — OFFICE VISIT (OUTPATIENT)
Dept: PHYSICAL THERAPY | Facility: CLINIC | Age: 58
End: 2023-03-31

## 2023-03-31 DIAGNOSIS — M76.61 TENDONITIS, ACHILLES, RIGHT: Primary | ICD-10-CM

## 2023-03-31 NOTE — PROGRESS NOTES
Daily Note     Today's date: 3/31/2023  Patient name: Sandeep Arvizu  : 1965  MRN: [id number]  Referring provider: Lenny Gowers, MD  Dx:   Encounter Diagnosis     ICD-10-CM    1  Tendonitis, Achilles, right  M76 61           Start Time: 1015  Stop Time: 1100  Total time in clinic (min): 45 minutes     Patient was co-treated by a physical therapy student, Juana Vargas, under my direct supervision  Subjective: Pt reports she is doing well and has been having less pain  She continues to have altered sensation on the plantar surface of her R foot and on the dorsum of her R great toe  The sensation is reported to be numb, however, she is able to feel those areas  The sensation is reported to be improving slowly  Objective: See treatment diary below      Assessment: Tolerated treatment well  Pt has excellent awareness of precautions and is adherent to HEP and physician instructions  Discussed changing how her foot is elevated to relieve pressure on the heel as per Dr Lv Angel since incision demonstrated mild redness at eval  Patient demonstrated fatigue post treatment and would benefit from continued PT to maximize function and promote return to PLOF as per protocol  Plan: Continue per plan of care  Progress treatment as tolerated  Progress treament per protocol  Insurance:  A/CMS Eval/ Sarbjit Bentley #/ Referral # Total units  Start date  Expiration date Extension  Visit limitation? PT only or  PT+OT?  Co-Insurance   [facility] AND [facility]) 3/27/2023 No auth needed     30 PCY PT  $15 copay                                                     POC Start Date POC Expiration Date Signed POC?   3/27/2023 12 weeks - 2023       Date               Units:  Used               Authed:  Remaining                  Precautions:   Past Medical History:   Diagnosis Date   • Anxiety    • Arthritis    • Degenerative disc disease, cervical     2 cervical and 2 lumbar, goes for spinal decompression therapy 2x monthly   • Depression    • History of herniated intervertebral disc 10/19/2005   • Hypertension        Patient provided verbal consent to treatment plan and recommended interventions      Date 3/27/2023 3/31/2023       Visit Number IE 2       FOTO Tracking Intake Survey     Follow-up #1   Manual         Ankle mobs         Forefoot mobs                                    TherEx         Active POP         Ankle PROM         Ankle AROM         Ankle TB 4-way  Yellow ball iso 10x2 each       Hip ext  10x3 (Rt only)       SAQ  10x3 (Rt only)       SLR  10x3 (Rt only)       SL hip abd  10x3 (Rt only)                Neuro Re-Ed         Ankle alphabet         Fitter         BAPS         Foot intrinsics  Towel scrunch 10x3 (Rt only)                                           TherAct         Patient education Discussed and reviewed HEP and POC Discussed tissue healing, precautions and updates to [employer]                                           Gait Training         AD training                           Modalities         Ice

## 2023-04-03 ENCOUNTER — TELEPHONE (OUTPATIENT)
Dept: OBGYN CLINIC | Facility: HOSPITAL | Age: 58
End: 2023-04-03

## 2023-04-03 ENCOUNTER — OFFICE VISIT (OUTPATIENT)
Dept: PHYSICAL THERAPY | Facility: CLINIC | Age: 58
End: 2023-04-03

## 2023-04-03 DIAGNOSIS — M76.61 TENDONITIS, ACHILLES, RIGHT: Primary | ICD-10-CM

## 2023-04-03 NOTE — TELEPHONE ENCOUNTER
Caller: Tito Barraza w/ [facility]     Doctor: Felipe Yancey     Reason for call: Patient had a fall without boot on Saturday   She states it does have a negative [place] test and not in significant pain but is slightly sore       She wanted to make office aware     Call back#: [phone]

## 2023-04-03 NOTE — PROGRESS NOTES
Daily Note     Today's date: 4/3/2023  Patient name: Nicole Lou  : 1965  MRN: [id number]  Referring provider: Lukas Drake MD  Dx:   Encounter Diagnosis     ICD-10-CM    1  Tendonitis, Achilles, right  M76 61           Start Time: 1015  Stop Time: 1055  Total time in clinic (min): 40 minutes    Patient was co-treated by a physical therapy student, Titus Lou, under my direct supervision  Subjective: Pt reports that she fell while trying to get to the shower in her home on Saturday ()  She is unsure if she became dizzy, lost her balance, or got caught on something/tripped  She did state that she put weight on her R foot but is not sure how much  At the time, she was not wearing her boot and she was using the RW with a hopping technique to be NWB on her RLE  She performed a [place] Stefan test on herself and reported that her foot did move  She reports no other injuries from this fall and did not go to the ED  She contacted the physician's office and left a message  Objective: See treatment diary below  The pt received a phone call from a nurse at the physician's office during the session  The supervising PT contacted the physician's office and they said they will call the patient  Visual inspection was unremarkable, there was slight swelling in the ankle with no changes in visible briusing  (-) Morse test       Assessment: Tolerated treatment fair  Discussed fall with patient and asked clarifying questions about other possible injuries  Assessed incision and gastrocnemius of RLE  Incision was intact and dry with slight increase in edema around R ankle noted  There is residual bruising on the medial aspect of her R ankle  Slight TTP at the musculotendinous junction of the R gastrocnemius  She continues to demonstrate AROM for all motions of the R ankle with PF being limited due to pain, but force production is present   Discussed modifications  Patient demonstrated fatigue post treatment and would benefit from continued PT to maximize function to promote return to PLOF  Plan: Continue per plan of care  Progress treatment as tolerated  Progress treament per protocol  Insurance:  AMA/CMS Niraj/ Christian Templeton #/ Referral # Total units  Start date  Expiration date Extension  Visit limitation? PT only or  PT+OT? Co-Insurance   CMS [facility] AND [facility]) 3/27/2023 No auth needed     30 PCY PT  $15 copay                                                     POC Start Date POC Expiration Date Signed POC?   3/27/2023 12 weeks - 6/19/2023       Date               Units:  Used               Authed:  Remaining                  Precautions:   Past Medical History:   Diagnosis Date   • Anxiety    • Arthritis    • Degenerative disc disease, cervical     2 cervical and 2 lumbar, goes for spinal decompression therapy 2x monthly   • Depression    • History of herniated intervertebral disc 10/19/2005   • Hypertension        Patient provided verbal consent to treatment plan and recommended interventions      Date 3/27/2023 3/31/2023 4/3/2023      Visit Number IE 2 3      FOTO Tracking Intake Survey     Follow-up #1   Manual         Ankle mobs         Forefoot mobs                                    TherEx         Active POP         Ankle PROM         Ankle AROM         Ankle TB 4-way  Yellow ball iso 10x2 each Yellow ball iso 10x2 each, no PF today      Hip ext  10x3 (Rt only)       SAQ  10x3 (Rt only)       SLR  10x3 (Rt only) 10x3 (Rt only)      SL hip abd  10x3 (Rt only)                Neuro Re-Ed         Ankle alphabet   x3      Fitter         BAPS         Foot intrinsics  Towel scrunch 10x3 (Rt only)                                           TherAct         Patient education Discussed and reviewed HEP and POC Discussed tissue healing, precautions and updates to HEP Discussion about fall, HEP modifications, tissue healing                                          Gait Training         AD training Modalities         Ice

## 2023-04-03 NOTE — TELEPHONE ENCOUNTER
Spoke with patient and she states she did bear weight due to the slip and fall on the way to the shower  She did not have any increased pain just soreness to the surgical area, mild increase in swelling  Did the [facility] test and her foot moved when she squeezed her calf, so that was good  She is currently at PT and they felt she was ok  Will continue to monitor for worsening of symptoms and call office if this would happen  She is supposed to remove another wedge tomorrow  She wanted to know if she should do this or hold off?

## 2023-04-03 NOTE — TELEPHONE ENCOUNTER
Caller: Patient    Doctor: DR Lachman     Reason for call: Patient left a VM stating she had a slip and fall in the shower over the weekend- patient explained on VM she was not in pain    Asked to speak to clinical team about icing and elevating     Call back#: [phone]

## 2023-04-07 ENCOUNTER — OFFICE VISIT (OUTPATIENT)
Dept: PHYSICAL THERAPY | Facility: CLINIC | Age: 58
End: 2023-04-07

## 2023-04-07 DIAGNOSIS — M76.61 TENDONITIS, ACHILLES, RIGHT: Primary | ICD-10-CM

## 2023-04-07 NOTE — PROGRESS NOTES
Daily Note     Today's date: 2023  Patient name: Rony Kumari  : 1965  MRN: [id number]  Referring provider: Claire Teixeira MD  Dx:   Encounter Diagnosis     ICD-10-CM    1  Tendonitis, Achilles, right  M76 61           Start Time: 1015  Stop Time: 1100  Total time in clinic (min): 45 minutes        Subjective: Pt reports slight soreness and stretching of her R LE today  She is having a hard time with ambulation due tightness and pain of her R Achilles  Objective: See treatment diary below  Assessment: Tolerated treatment fair  Added closed chain activities today, with no adverse effects  Provided patient with heel lift for eventual transition to the sneaker  Patient demonstrated fatigue post treatment and would benefit from continued PT to maximize function to promote return to PLOF  Plan: Continue per plan of care  Progress treatment as tolerated  Progress treament per protocol  Insurance:  AMA/CMS Eval/ Skip Reining #/ Referral # Total units  Start date  Expiration date Extension  Visit limitation? PT only or  PT+OT? Co-Insurance   [facility] AND [facility]) 3/27/2023 No auth needed     30 PCY PT  $15 copay                                                     POC Start Date POC Expiration Date Signed POC?   3/27/2023 12 weeks - 2023       Date               Units:  Used               Authed:  Remaining                  Precautions:   Past Medical History:   Diagnosis Date   • Anxiety    • Arthritis    • Degenerative disc disease, cervical     2 cervical and 2 lumbar, goes for spinal decompression therapy 2x monthly   • Depression    • History of herniated intervertebral disc 10/19/2005   • Hypertension        Patient provided verbal consent to treatment plan and recommended interventions      Date 3/27/2023 3/31/2023 4/3/2023 4/7/23     Visit Number IE 2 3 4     FOTO Tracking Intake Survey     Follow-up #1   Manual         Ankle mobs         Forefoot mobs "       TherEx         Active POP         Ankle PROM         Ankle AROM         Ankle TB 4-way  Yellow ball iso 10x2 each Yellow ball iso 10x2 each, no PF today Yellow ball iso 10x2 each, no PF today     Hip ext  10x3 (Rt only)       SAQ  10x3 (Rt only)       SLR  10x3 (Rt only) 10x3 (Rt only)      SL hip abd  10x3 (Rt only)  Standing flex, abd 2x10      Squat with 2\" block under L LE    2x10      Weight shifting to R LE     2x10                        Neuro Re-Ed         Ankle alphabet   x3 x3     Fitter         BAPS         Foot intrinsics  Towel scrunch 10x3 (Rt only)                                           TherAct         Patient education Discussed and reviewed HEP and POC Discussed tissue healing, precautions and updates to HEP Discussion about fall, HEP modifications, tissue healing                                          Gait Training         AD training                           Modalities         Ice                    "

## 2023-04-17 ENCOUNTER — APPOINTMENT (OUTPATIENT)
Dept: PHYSICAL THERAPY | Facility: CLINIC | Age: 58
End: 2023-04-17

## 2023-04-24 ENCOUNTER — OFFICE VISIT (OUTPATIENT)
Dept: PHYSICAL THERAPY | Facility: CLINIC | Age: 58
End: 2023-04-24

## 2023-04-24 DIAGNOSIS — M76.61 TENDONITIS, ACHILLES, RIGHT: Primary | ICD-10-CM

## 2023-04-24 NOTE — PROGRESS NOTES
Daily Note     Today's date: 2023  Patient name: Gutierrez Araujo  : 1965  MRN: [id number]  Referring provider: Gisselle Gale MD  Dx:   Encounter Diagnosis     ICD-10-CM    1  Tendonitis, Achilles, right  M76 61           Start Time: 1015  Stop Time: 1100  Total time in clinic (min): 45 minutes    Subjective: Patient reports no changes since last session  Objective: See treatment diary below      Assessment: Tolerated treatment well  Continue advancement of current activities as allowed by protocol  Patient reported mild anterior ankle stiffness with dorsiflexion activities likely secondary to swelling in that area and prevention of dorsiflexion activities previously  Patient will begin progressing into sneaker next week  Patient demonstrated fatigue post treatment and would benefit from continued PT in order to improve functional mobility and return to PLOF  Plan: Continue per plan of care  Insurance:  AMA/CMS Niraj/ Tatyana Coombs #/ Referral # Total units  Start date  Expiration date Extension  Visit limitation? PT only or  PT+OT? Co-Insurance   [facility]) 3/27/2023 No auth needed     30 PCY PT  $15 copay                                                     POC Start Date POC Expiration Date Signed POC?   3/27/2023 12 weeks - 2023       Date               Units:  Used               Authed:  Remaining                  Precautions:   Past Medical History:   Diagnosis Date   • Anxiety    • Arthritis    • Degenerative disc disease, cervical     2 cervical and 2 lumbar, goes for spinal decompression therapy 2x monthly   • Depression    • History of herniated intervertebral disc 10/19/2005   • Hypertension        Patient provided verbal consent to treatment plan and recommended interventions      Date 4/7/23 4/10/23 4/18/23 4/21/23 4/24/23   Visit Number [phone]   FOTO Tracking   Follow-up #1     Manual        Ankle mobs        Forefoot mobs                                TherEx "     Active POP   NS x 8 min  NS x 8 min   Ankle PROM        Ankle AROM        Ankle TB 4-way Yellow ball iso 10x2 each, no PF today Yellow ball iso 10x2 each Yellow ball iso inv 10 x 10 sec    Ankle 4 way GTB x10 each Yellow ball iso inv 10 x 10 sec    Ankle 4 way GTB x10 each Ankle 4 way BTB x10 each   Hip ext  10x3 (Rt only)  STS 10x1    STS with RLE posterior to LLE 10x1    SAQ        SLR  10x3 (Rt only)      SL hip abd Standing flex, abd 2x10  10x3 (Rt only)      Squat with 2\" block under L LE 2x10  3x10   10x2 at mirror    Weight shifting to R LE  2x10   rev     Bridges  10x3   3x10   DF in standing on step     Stretch 15 x 5 sec holds           Neuro Re-Ed        Ankle alphabet x3 x3 x3 x3 rev   Fitter        BAPS        Foot intrinsics                                        TherAct        Patient education                                        Gait Training        AD training  100'x2 with SPC on L                      Modalities        Ice                           "

## 2023-04-28 ENCOUNTER — OFFICE VISIT (OUTPATIENT)
Dept: PHYSICAL THERAPY | Facility: CLINIC | Age: 58
End: 2023-04-28

## 2023-04-28 DIAGNOSIS — M76.61 TENDONITIS, ACHILLES, RIGHT: Primary | ICD-10-CM

## 2023-04-28 NOTE — PROGRESS NOTES
Daily Note     Today's date: 2023  Patient name: Sandeep Arvizu  : 1965  MRN: [id number]  Referring provider: Lenny Gowers, MD  Dx:   Encounter Diagnosis     ICD-10-CM    1  Tendonitis, Achilles, right  M76 61           Start Time: 930  Stop Time: 1015  Total time in clinic (min): 45 minutes    Subjective: Patient reports that she has progressed to 5 hours in the sneaker today  She reports mild soreness by end of day, but overall gradual improvements  She states that her primary limiting factor for ambulation is her GT pain  Objective: See treatment diary below      Assessment: Tolerated treatment well  Performed weight-shifting from anterior to posterior to simulate gait and neccessary rollover onto toe of push-off  Since patient reports primary limiting factor as GT pain, introduced GT distraction mobs with stretching into flexion and extension which was initially painful but improved as repetitions were performed  Patient demonstrated fatigue post treatment and would benefit from continued PT to address functional mobility and return to PLOF  Plan: Continue per plan of care  Insurance:  AMA/CMS Niraj/ Sarbjit Bentley #/ Referral # Total units  Start date  Expiration date Extension  Visit limitation? PT only or  PT+OT?  Co-Insurance   [facility] AND [facility]) 3/27/2023 No auth needed     30 PCY PT  $15 copay                                                     POC Start Date POC Expiration Date Signed POC?   3/27/2023 12 weeks - 2023       Date               Units:  Used               Authed:  Remaining                  Precautions:   Past Medical History:   Diagnosis Date   • Anxiety    • Arthritis    • Degenerative disc disease, cervical     2 cervical and 2 lumbar, goes for spinal decompression therapy 2x monthly   • Depression    • History of herniated intervertebral disc 10/19/2005   • Hypertension        Patient provided verbal consent to treatment plan and recommended "interventions      Date 4/10/23 4/18/23 4/21/23 4/24/23 4/28/23   Visit Number [phone]   FOTO Tracking  Follow-up #1      Manual        Ankle mobs        Forefoot mobs     1st ray mob 3x30 sec   GT mobs     Distraction 3x30 sec; distraction w/ flex/ext x 5 min                   TherEx        Active POP  NS x 8 min  NS x 8 min NS x 10 min   Ankle PROM        Ankle AROM        Ankle TB 4-way Yellow ball iso 10x2 each Yellow ball iso inv 10 x 10 sec    Ankle 4 way GTB x10 each Yellow ball iso inv 10 x 10 sec    Ankle 4 way GTB x10 each Ankle 4 way BTB x10 each Ankle 4 way BTB 3x10 each   Hip ext 10x3 (Rt only)  STS 10x1    STS with RLE posterior to LLE 10x1     SAQ        SLR 10x3 (Rt only)       SL hip abd 10x3 (Rt only)       Squat with 2\" block under L LE 3x10   10x2 at mirror  3x10   Weight shifting to R LE   rev      Bridges 10x3   3x10    DF in standing on step    Stretch 15 x 5 sec holds Stretch 15 x 5 sec holds           Neuro Re-Ed        Ankle alphabet x3 x3 x3 rev    Fitter        BAPS        Foot intrinsics                                        TherAct        Patient education                                        Gait Training        AD training 100'x2 with SPC on L       Weight shifting     Normalized gait mechanics 2x10           Modalities        Ice                             "

## 2023-05-01 ENCOUNTER — OFFICE VISIT (OUTPATIENT)
Dept: PHYSICAL THERAPY | Facility: CLINIC | Age: 58
End: 2023-05-01

## 2023-05-01 DIAGNOSIS — M76.61 TENDONITIS, ACHILLES, RIGHT: Primary | ICD-10-CM

## 2023-05-01 NOTE — PROGRESS NOTES
Daily Note     Today's date: 2023  Patient name: Yas Marie  : 1965  MRN: [id number]  Referring provider: Miranda Lucas MD  Dx:   Encounter Diagnosis     ICD-10-CM    1  Tendonitis, Achilles, right  M76 61           Start Time: 1015  Stop Time: 1100  Total time in clinic (min): 45 minutes    Patient was co-treated by a physical therapy student, Marita Smith, under my direct supervision  Subjective: Pt reports some bruising on the outside of her R heel  She reports feeling generally off balance when walking but would like to wean form using the cane  Objective: See treatment diary below      Assessment: Tolerated treatment well  Pt demonstrates impaired R GT ROM which will impair gait and other functional mobility  Discussed adding ant tib strengthening/endurance exercises for improving gait  Patient demonstrated fatigue post treatment and would benefit from continued PT to maximize function to promote return to PLOF  Plan: Continue per plan of care  Progress treatment as tolerated  Insurance:  [place]/CMS Niraj/ Margurite Kawasaki #/ Referral # Total units  Start date  Expiration date Extension  Visit limitation? PT only or  PT+OT? Co-Insurance   [facility] AND [facility]) 3/27/2023 No auth needed     30 PCY PT  $15 copay                                                     POC Start Date POC Expiration Date Signed POC?   3/27/2023 12 weeks - 2023       Date               Units:  Used               Authed:  Remaining                  Precautions:   Past Medical History:   Diagnosis Date    Anxiety     Arthritis     Degenerative disc disease, cervical     2 cervical and 2 lumbar, goes for spinal decompression therapy 2x monthly    Depression     History of herniated intervertebral disc 10/19/2005    Hypertension        Patient provided verbal consent to treatment plan and recommended interventions      Date 23   Visit Number [phone] [facility] 96 "Follow-up #1       Manual        Ankle mobs        Forefoot mobs    1st ray mob 3x30 sec 1st ray mob 3x30 sec   GT mobs    Distraction 3x30 sec; distraction w/ flex/ext x 5 min Distraction 3x30 sec; distraction w/ flex/ext x 5 min                   TherEx        Active POP NS x 8 min  NS x 8 min NS x 10 min    Ankle PROM        Ankle AROM        Ankle TB 4-way Yellow ball iso inv 10 x 10 sec    Ankle 4 way GTB x10 each Yellow ball iso inv 10 x 10 sec    Ankle 4 way GTB x10 each Ankle 4 way BTB x10 each Ankle 4 way BTB 3x10 each Ankle 4 way BTB 3x10 each    Ant tib DF 20x2   Hip ext  STS 10x1    STS with RLE posterior to LLE 10x1      SAQ        SLR        SL hip abd        Squat with 2\" block under L LE  10x2 at mirror  3x10 3x10   Weight shifting to R LE  rev       Bridges   3x10     DF in standing on step   Stretch 15 x 5 sec holds Stretch 15 x 5 sec holds Stretch 15 x 5 sec holds with mobilization for DF           Neuro Re-Ed        Ankle alphabet x3 x3 rev     Fitter        BAPS        Foot intrinsics                                        TherAct        Patient education                                        Gait Training        AD training        Weight shifting    Normalized gait mechanics 2x10 Gait with increased speed 100'x2           Modalities        Ice                               "

## 2023-05-04 ENCOUNTER — EVALUATION (OUTPATIENT)
Dept: PHYSICAL THERAPY | Facility: CLINIC | Age: 58
End: 2023-05-04

## 2023-05-04 DIAGNOSIS — M76.61 TENDONITIS, ACHILLES, RIGHT: Primary | ICD-10-CM

## 2023-05-04 NOTE — PROGRESS NOTES
Daily Note     Today's date: 2023  Patient name: Alie Moore  : 1965  MRN: [id number]  Referring provider: Hoa Dejesus MD  Dx:   Encounter Diagnosis     ICD-10-CM    1  Tendonitis, Achilles, right  M76 61           Start Time: 1500  Stop Time: 1545  Total time in clinic (min): 45 minutes    Subjective: Patient is progressing well  She states mild lateral ankle swelling by end of day  Objective: See treatment diary below  Reassessment performed today  Short Term Goals (2 weeks):  MET  1  Patient will be independent with basic HEP  2  Patient will report >50% reduction in pain  3  Patient will demonstrate >1/3 improvement in MMT grade as applicable  4  Patient will increase R ankle ROM to normalize gait for functional mobility  5  Patient will demonstrate improved sensation on superior aspect of distal foot for increased comfort during activity and rest    Long Term Goals (12 weeks): PROGRESSING  1  Patient will be independent in a comprehensive home exercise program  2  Patient FOTO score will improve to 52/100  3  Patient will self-report >75% improvement in function  4  Patient will walk with minimal deviations to allow access to community and prepare for return to work/leisure  5   Patient will demonstrate improved ability to WB and decrease reliance on RW to promote improve functional mobility    Pain   3/27/2023 5/4/23    Current 3/10 1/10    Best 3/10 1/10    Worst 5/10 3/10     Patient Specific Functional Scale  0 is unable, 10 is no limitations  Activity 3/27/2023 5/4/23   Stairs 5/10 7/10   Standing 6/10 9/10   Walking 6/10 8/10     LE MMT   3/27/2023 5/4/23    LEFT RIGHT  RIGHT    Hip Flexion 4-/5 4-/5 5/5    Hip Abduction 4/5 4/5 5/5    Hip Adduction 4/5 4/5 5/5         Knee Flexion 4/5 4/5 5/5    Knee Extension 4-/5 4-/5 5/5        Ankle DF 4/5 3+/5 4+/5    Ankle PF 4/5 3/5 4+/5    Ankle Inversion 4/5 3+/5 4+/5    Ankle Eversion 4/5 3+/5 4+/5    Great Toe Extension 4/5 4/5 5/5     (*) = reproduction of patients reported pain      LE ROM   3/27/2023 5/4/23    LEFT RIGHT  RIGHT    Hip Flexion [facility]     Hip Extension [facility]         Knee Flexion [facility] WFL     Knee Extension [facility] WFL         Ankle DF WFL -30 deg 10 deg    Ankle PF WFL 55 deg 55 deg    Ankle Inversion WFL 25 deg 40 deg    Ankle Eversion WFL 20 deg 25 deg     (*) = reproduction of patients reported pain    Sensation  Light touch: intact bilaterally, mild decreased sensation in arch and GT    Edema   3/27/2023     LEFT RIGHT      Figure 8 55 cm 58 cm 56 5 cm    Metatarsal heads 23 5 cm 25 cm 23 5 cm      Functional Assessment  Stair Negotiation:   Ascend: slightly sideways reciprocal with UL arm support    Descend: slightly sideways reciprocal with UL arm support   Gait Assessment: slight gait deviation with decreased stance time on Rt, but no pain      Assessment: Juana Garcia has attended 11 visits over 5 weeks  They have made notable improvements in range of motion and ambulation tolerance  Despite improvements, patient continues to have limitations in ambulation tolerance, weightbearing tolerance, and functional strength as noted below  These impairments continue to limited the patient's ability to reciprocally ascend and descend stairs as well as ambulated community distances and participate in recreational fitness activities  Patient has made progress towards short term goals and long term goals  They would benefit from continued skilled physical therapy 2x per week for 6 weeks to address these impairments and functional limitations in order to return to [facility]  Plan: Continue per plan of care  Insurance:  AMA/ISELA Healy/ Carlotta Christianson #/ Referral # Total units  Start date  Expiration date Extension  Visit limitation? PT only or  PT+OT?  Co-Insurance   [facility]) 3/27/2023 No auth needed     30 PCY PT  $15 copay                                                     POC Start Date POC Expiration Date Signed "POC?   3/27/2023 12 weeks - 6/19/2023       Date               Units:  Used               Authed:  Remaining                  Precautions:   Past Medical History:   Diagnosis Date    Anxiety     Arthritis     Degenerative disc disease, cervical     2 cervical and 2 lumbar, goes for spinal decompression therapy 2x monthly    Depression     History of herniated intervertebral disc 10/19/2005    Hypertension        Patient provided verbal consent to treatment plan and recommended interventions      Date 4/21/23 4/24/23 4/28/23 5/1/23 5/4/23   Visit Number [phone] 11   FOTO Tracking        Manual        Ankle mobs        Forefoot mobs   1st ray mob 3x30 sec 1st ray mob 3x30 sec    GT mobs   Distraction 3x30 sec; distraction w/ flex/ext x 5 min Distraction 3x30 sec; distraction w/ flex/ext x 5 min Distraction 3x30 sec; distraction w/ flex/ext x 5 min                   TherEx        Active POP  NS x 8 min NS x 10 min     Ankle PROM        Ankle AROM        Ankle TB 4-way Yellow ball iso inv 10 x 10 sec    Ankle 4 way GTB x10 each Ankle 4 way BTB x10 each Ankle 4 way BTB 3x10 each Ankle 4 way BTB 3x10 each    Ant tib DF 20x2    Hip ext STS 10x1    STS with RLE posterior to LLE 10x1       SAQ        SLR        SL hip abd        Squat with 2\" block under L LE 10x2 at mirror  3x10 3x10    Weight shifting to R LE         Bridges  3x10      DF in standing on step  Stretch 15 x 5 sec holds Stretch 15 x 5 sec holds Stretch 15 x 5 sec holds with mobilization for DF Stretch 10 x 10 sec holds with mobilization for DF   Runner's stretch     Knee straight 5x15 sec; knee bent 5x15 sec           Neuro Re-Ed        Ankle alphabet x3 rev      Fitter        BAPS        Foot intrinsics        Heel raises with iso inversion into ball     2x10   Eccentric ant heel taps     2\" x30  In mirror x20                   TherAct        Patient education     Reassess x 20 min                                   Gait Training        AD training      " Weight shifting   Normalized gait mechanics 2x10 Gait with increased speed 100'x2            Modalities        Ice

## 2023-05-05 ENCOUNTER — APPOINTMENT (OUTPATIENT)
Dept: PHYSICAL THERAPY | Facility: CLINIC | Age: 58
End: 2023-05-05
Payer: COMMERCIAL

## 2023-05-08 ENCOUNTER — OFFICE VISIT (OUTPATIENT)
Dept: PHYSICAL THERAPY | Facility: CLINIC | Age: 58
End: 2023-05-08

## 2023-05-08 DIAGNOSIS — M76.61 TENDONITIS, ACHILLES, RIGHT: Primary | ICD-10-CM

## 2023-05-08 NOTE — PROGRESS NOTES
Daily Note     Today's date: 2023  Patient name: Yara Morales  : 1965  MRN: [id number]  Referring provider: Romy Christianson MD  Dx:   Encounter Diagnosis     ICD-10-CM    1  Tendonitis, Achilles, right  M76 61           Start Time: 1015  Stop Time: 1100  Total time in clinic (min): 45 minutes    Subjective: Patient reports slight increased soreness today secondary to increased activity over the weekend  Objective: See treatment diary below      Assessment: Tolerated treatment well  Continued advancement of functional strengthening  At this point, she is unable to walk on toes and perform SL calf raise so introduced eccentric loaded SL calf raises with patient reporting no pain, but high level of difficulty  Also introduced functional balance activities in order to normalize gait and progress off SPC on uneven terrain  Dicussed use of toe spacer secondary to hallux valgus and improvement in GT range of motion with proper alignment which may improve rollover during gait  Patient demonstrated fatigue post treatment and would benefit from continued PT to address functional mobility deficits and improve strength in order to return to PLOF  Plan: Continue per plan of care  Insurance:  AMA/CMS Niraj/ Quintin Elizondo #/ Referral # Total units  Start date  Expiration date Extension  Visit limitation? PT only or  PT+OT?  Co-Insurance   [facility] AND [facility]) 3/27/2023 No auth needed     30 PCY PT  $15 copay                                                     POC Start Date POC Expiration Date Signed POC?   3/27/2023 12 weeks - 2023       Date               Units:  Used               Authed:  Remaining                  Precautions:   Past Medical History:   Diagnosis Date   • Anxiety    • Arthritis    • Degenerative disc disease, cervical     2 cervical and 2 lumbar, goes for spinal decompression therapy 2x monthly   • Depression    • History of herniated intervertebral disc 10/19/2005   • Hypertension "       Patient provided verbal consent to treatment plan and recommended interventions      Date 4/24/23 4/28/23 5/1/23 5/4/23 5/8/23   Visit Number [phone] 11 12   FOTO Tracking        Manual        Ankle mobs        Forefoot mobs  1st ray mob 3x30 sec 1st ray mob 3x30 sec     GT mobs  Distraction 3x30 sec; distraction w/ flex/ext x 5 min Distraction 3x30 sec; distraction w/ flex/ext x 5 min Distraction 3x30 sec; distraction w/ flex/ext x 5 min Distraction 3x30 sec; distraction w/ flex/ext x 5 min    Toe spacer                   TherEx        Active POP NS x 8 min NS x 10 min      Ankle PROM        Ankle AROM        Ankle TB 4-way Ankle 4 way BTB x10 each Ankle 4 way BTB 3x10 each Ankle 4 way BTB 3x10 each    Ant tib DF 20x2     Hip ext        SAQ        SLR        SL hip abd        Squat with 2\" block under L LE  3x10 3x10     Weight shifting to R LE         Bridges 3x10       DF in standing on step Stretch 15 x 5 sec holds Stretch 15 x 5 sec holds Stretch 15 x 5 sec holds with mobilization for DF Stretch 10 x 10 sec holds with mobilization for DF    Runner's stretch    Knee straight 5x15 sec; knee bent 5x15 sec Knee straight 5x15 sec; knee bent 5x15 sec           Neuro Re-Ed        Ankle alphabet rev       Fitter     In standing DF/PF and inv/ev x20 each (L2)   BAPS        Foot intrinsics        Heel raises with iso inversion into ball    2x10 2x10   Eccentric ant heel taps    2\" x30  In mirror x20 2\" x30  In mirror x20   Hip burners     10 x 5 sec hold each   Balance beam      FW/BW x5 each   Eccentric SL heel raises     Off step - up with two, eccentric lower with one x10            TherAct        Patient education    Reassess x 20 min Updated HEP (towel soleus stretch, eccentric SL calf raises)                                   Gait Training        AD training        Weight shifting  Normalized gait mechanics 2x10 Gait with increased speed 100'x2             Modalities        Ice                                 "

## 2023-05-12 ENCOUNTER — OFFICE VISIT (OUTPATIENT)
Dept: PHYSICAL THERAPY | Facility: CLINIC | Age: 58
End: 2023-05-12

## 2023-05-12 DIAGNOSIS — M76.61 TENDONITIS, ACHILLES, RIGHT: Primary | ICD-10-CM

## 2023-05-12 NOTE — PROGRESS NOTES
"Daily Note     Today's date: 2023  Patient name: Kulwinder Jara  : 1965  MRN: [id number]  Referring provider: Jaspreet Pandey MD  Dx:   Encounter Diagnosis     ICD-10-CM    1  Tendonitis, Achilles, right  M76 61           Start Time: 1015  Stop Time: 1100  Total time in clinic (min): 45 minutes    Patient was co-treated by a physical therapy student, Leora Bills, under my direct supervision  Subjective: Pt reports she is doing well  She states she continues to have difficulty with plantarflexion due to \"calf weakness  \" She has tried a hallux valgus correction brace/spacer and it has improved her comfort  Objective: See treatment diary below      Assessment: Tolerated treatment well  She continues to demonstrate impaired PF strength in her RLE which limits her ability to eccentrically lower herself during functional mobility  She can benefit from continued loading of her R gastrocnemius, focusing on eccentric loading in order to progress to improved strength for supporting her bodyweight during functional activity  Increasing R gastrocnemius strength will improve her ability to push off during gait  Patient demonstrated fatigue post treatment and would benefit from continued PT to maximize function and promote full return to PLOF  Plan: Continue per plan of care  Progress treatment as tolerated  Insurance:  AMA/CMS Eval/ Malcom Roll #/ Referral # Total units  Start date  Expiration date Extension  Visit limitation? PT only or  PT+OT?  Co-Insurance   [facility] AND [facility]) 3/27/2023 No auth needed     30 PCY PT  $15 copay                                                     POC Start Date POC Expiration Date Signed POC?   3/27/2023 12 weeks - 2023       Date               Units:  Used               Authed:  Remaining                  Precautions:   Past Medical History:   Diagnosis Date   • Anxiety    • Arthritis    • Degenerative disc disease, cervical     2 cervical and 2 lumbar, " "goes for spinal decompression therapy 2x monthly   • Depression    • History of herniated intervertebral disc 10/19/2005   • Hypertension        Patient provided verbal consent to treatment plan and recommended interventions      Date 4/24/23 4/28/23 5/1/23 5/4/23 5/8/23 5/12/23   Visit Number [phone] 11 12 13   FOTO Tracking         Manual         Ankle mobs         Forefoot mobs  1st ray mob 3x30 sec 1st ray mob 3x30 sec      GT mobs  Distraction 3x30 sec; distraction w/ flex/ext x 5 min Distraction 3x30 sec; distraction w/ flex/ext x 5 min Distraction 3x30 sec; distraction w/ flex/ext x 5 min Distraction 3x30 sec; distraction w/ flex/ext x 5 min    Toe spacer Shakila taping for R hallux valgus                     TherEx         Active POP NS x 8 min NS x 10 min    Recumbent 10 mins   Ankle PROM         Ankle AROM         Ankle TB 4-way Ankle 4 way BTB x10 each Ankle 4 way BTB 3x10 each Ankle 4 way BTB 3x10 each    Ant tib DF 20x2      Hip ext         SAQ         SLR         SL hip abd         Squat with 2\" block under L LE  3x10 3x10      Weight shifting to R LE          Bridges 3x10        DF in standing on step Stretch 15 x 5 sec holds Stretch 15 x 5 sec holds Stretch 15 x 5 sec holds with mobilization for DF Stretch 10 x 10 sec holds with mobilization for DF     Runner's stretch    Knee straight 5x15 sec; knee bent 5x15 sec Knee straight 5x15 sec; knee bent 5x15 sec             Neuro Re-Ed         Ankle alphabet rev        Fitter     In standing DF/PF and inv/ev x20 each (L2) In standing DF/PF and inv/ev x20 each (L2)   BAPS         Foot intrinsics      Heel-toe walking, even steps, faster genny 100'x2   Heel raises with iso inversion into ball    2x10 2x10 Toe walking 8'x5 laps    Lunges focus on RLE PF to return x14   Eccentric ant heel taps    2\" x30  In mirror x20 2\" x30  In mirror x20    Hip burners     10 x 5 sec hold each 10 x 5 sec hold each   Balance beam      FW/BW x5 each    Eccentric SL heel " raises     Off step - up with two, eccentric lower with one x10  Off step - up with two, eccentric lower with one x10             TherAct         Patient education    Reassess x 20 min Updated HEP (towel soleus stretch, eccentric SL calf raises)                                        Gait Training         AD training         Weight shifting  Normalized gait mechanics 2x10 Gait with increased speed 100'x2               Modalities         Ice

## 2023-05-15 ENCOUNTER — OFFICE VISIT (OUTPATIENT)
Dept: PHYSICAL THERAPY | Facility: CLINIC | Age: 58
End: 2023-05-15

## 2023-05-15 DIAGNOSIS — M76.61 TENDONITIS, ACHILLES, RIGHT: Primary | ICD-10-CM

## 2023-05-15 NOTE — PROGRESS NOTES
Daily Note     Today's date: 5/15/2023  Patient name: Greg Bolanos  : 1965  MRN: [id number]  Referring provider: Veronique Ventura MD  Dx:   Encounter Diagnosis     ICD-10-CM    1  Tendonitis, Achilles, right  M76 61           Start Time: 1015  Stop Time: 1100  Total time in clinic (min): 45 minutes    Subjective: Patient reports mild increase in achilles soreness over the weekend likely secondary to increased activity on Friday  Objective: See treatment diary below      Assessment: Tolerated treatment well  Given patient's subjective report of soreness lasting greater than 48 hours, advised patient that we would regress some exercises and assess soreness this week  Discussed possibility of adding BFR to treatment protocol to help gain gastroc/soleus strength quicker  Surgeon was contacted for approval prior to initiating  Patient demonstrated fatigue post treatment and would benefit from continued PT to address functional mobility and strength in order to return to PLOF  Plan: Continue per plan of care  Add BFR if approved by surgeon  Insurance:  [place]/CMS Niraj/ Jw Lopez #/ Referral # Total units  Start date  Expiration date Extension  Visit limitation? PT only or  PT+OT? Co-Insurance   [facility] AND [facility]) 3/27/2023 No auth needed     30 PCY PT  $15 copay                                                     POC Start Date POC Expiration Date Signed POC?   3/27/2023 12 weeks - 2023  Y      Date               Units:  Used               Authed:  Remaining                  Precautions:   Past Medical History:   Diagnosis Date   • Anxiety    • Arthritis    • Degenerative disc disease, cervical     2 cervical and 2 lumbar, goes for spinal decompression therapy 2x monthly   • Depression    • History of herniated intervertebral disc 10/19/2005   • Hypertension        Patient provided verbal consent to treatment plan and recommended interventions      Date 5/1/23 5/4/23 5/8/23 5/12/23 5/15/23 "  Visit Number 10 11 12 13 14   FOTO Tracking        Manual        Ankle mobs        Forefoot mobs 1st ray mob 3x30 sec       GT mobs Distraction 3x30 sec; distraction w/ flex/ext x 5 min Distraction 3x30 sec; distraction w/ flex/ext x 5 min Distraction 3x30 sec; distraction w/ flex/ext x 5 min    Toe spacer Shakila taping for R hallux valgus                    TherEx        Active POP    Recumbent 10 mins RB x 8 min   Ankle PROM        Ankle AROM        Ankle TB 4-way Ankle 4 way BTB 3x10 each    Ant tib DF 20x2       Hip ext        SAQ        SLR        SL hip abd        Squat with 2\" block under L LE 3x10       Weight shifting to R LE         Bridges        DF in standing on step Stretch 15 x 5 sec holds with mobilization for DF Stretch 10 x 10 sec holds with mobilization for DF   Step stretch into DF (no mob) 10 x 5 sec   Runner's stretch  Knee straight 5x15 sec; knee bent 5x15 sec Knee straight 5x15 sec; knee bent 5x15 sec  Knee straight 5x15 sec; knee bent 5x15 sec           Neuro Re-Ed        Ankle alphabet        Fitter   In standing DF/PF and inv/ev x20 each (L2) In standing DF/PF and inv/ev x20 each (L2)    BAPS        Foot intrinsics    Heel-toe walking, even steps, faster genny 100'x2 Heel-toe walking, even steps, faster genny 100'x2   Heel raises with iso inversion into ball  2x10 2x10 Toe walking 8'x5 laps    Lunges focus on RLE PF to return x14 held   Eccentric ant heel taps  2\" x30  In mirror x20 2\" x30  In mirror x20     Hip burners   10 x 5 sec hold each 10 x 5 sec hold each 10 x 5 sec hold each   Balance beam    FW/BW x5 each     Eccentric SL heel raises   Off step - up with two, eccentric lower with one x10  Off step - up with two, eccentric lower with one x10  Off step - up with two, eccentric lower with one 2x10    SL calf raise on leg press     3x8 45#           TherAct        Patient education  Reassess x 20 min Updated HEP (towel soleus stretch, eccentric SL calf raises)                 " Gait Training        AD training        Weight shifting Gait with increased speed 100'x2               Modalities        Ice

## 2023-05-19 ENCOUNTER — OFFICE VISIT (OUTPATIENT)
Dept: PHYSICAL THERAPY | Facility: CLINIC | Age: 58
End: 2023-05-19

## 2023-05-19 DIAGNOSIS — M76.61 TENDONITIS, ACHILLES, RIGHT: Primary | ICD-10-CM

## 2023-05-19 NOTE — PROGRESS NOTES
Daily Note     Today's date: 2023  Patient name: Dalton Garsia  : 1965  MRN: [id number]  Referring provider: Justine Kong MD  Dx:   Encounter Diagnosis     ICD-10-CM    1  Tendonitis, Achilles, right  M76 61           Start Time: 1015  Stop Time: 1100  Total time in clinic (min): 45 minutes    Subjective: Patient reports frustration with slow progress and soreness in her calf, but acknowledges that time will improve both and are expected at this point in her recovery  Objective: See treatment diary below      Assessment: Tolerated treatment well  Initiated use of Blood Flow Restriction Therapy during today's session to promote hypertrophy and strength response  Utilized 80% occlusion to the Rt LE in accordance with rehabilitation guidelines  Patient was heavily educated on purpose, indications, and contraindications of BFRT during today's session  Patient provided verbal and written consent for participation prior to application  Patient demonstrated fatigue post treatment and would benefit from continued PT to address functional mobility and strength  Plan: Continue per plan of care  Insurance:  AMA/CMS Niraj/ Nisha Gaines #/ Referral # Total units  Start date  Expiration date Extension  Visit limitation? PT only or  PT+OT?  Co-Insurance   [facility] AND [facility]) 3/27/2023 No auth needed     30 PCY PT  $15 copay                                                     POC Start Date POC Expiration Date Signed POC?   3/27/2023 12 weeks - 2023  Y      Date               Units:  Used               Authed:  Remaining                  Precautions:   Past Medical History:   Diagnosis Date   • Anxiety    • Arthritis    • Degenerative disc disease, cervical     2 cervical and 2 lumbar, goes for spinal decompression therapy 2x monthly   • Depression    • History of herniated intervertebral disc 10/19/2005   • Hypertension        Patient provided verbal consent to treatment plan and recommended "interventions      Date 5/4/23 5/8/23 5/12/23 5/15/23 5/19/23   Visit Number 11 12 13 14 15   FOTO Tracking        Manual        Ankle mobs        Forefoot mobs        GT mobs Distraction 3x30 sec; distraction w/ flex/ext x 5 min Distraction 3x30 sec; distraction w/ flex/ext x 5 min    Toe spacer Shakila taping for R hallux valgus                     TherEx        Active POP   Recumbent 10 mins RB x 8 min RB x 8 min   Ankle PROM        Ankle AROM        Ankle TB 4-way        Hip ext        SAQ        SLR        SL hip abd        Squat with 2\" block under L LE        Weight shifting to R LE         Bridges        DF in standing on step Stretch 10 x 10 sec holds with mobilization for DF   Step stretch into DF (no mob) 10 x 5 sec    Runner's stretch Knee straight 5x15 sec; knee bent 5x15 sec Knee straight 5x15 sec; knee bent 5x15 sec  Knee straight 5x15 sec; knee bent 5x15 sec            Neuro Re-Ed        Ankle alphabet        Fitter  In standing DF/PF and inv/ev x20 each (L2) In standing DF/PF and inv/ev x20 each (L2)     BAPS        Foot intrinsics   Heel-toe walking, even steps, faster genny 100'x2 Heel-toe walking, even steps, faster genny 100'x2    Heel raises with iso inversion into ball 2x10 2x10 Toe walking 8'x5 laps    Lunges focus on RLE PF to return x14 held    Eccentric ant heel taps 2\" x30  In mirror x20 2\" x30  In mirror x20      Hip burners  10 x 5 sec hold each 10 x 5 sec hold each 10 x 5 sec hold each    Balance beam   FW/BW x5 each      Eccentric SL heel raises  Off step - up with two, eccentric lower with one x10  Off step - up with two, eccentric lower with one x10  Off step - up with two, eccentric lower with one 2x10     SL calf raise on leg press    3x8 45#    BFR protocol (x30, x15, x15, x15)     See below w/ therapeutic rest x 25 min           TherAct        Patient education Reassess x 20 min Updated HEP (towel soleus stretch, eccentric SL calf raises)   education on BFR and expectations, " risks, etc x 10 min                                   Gait Training        AD training        Weight shifting                Modalities        Ice          BFR Protocol:    5/19/23        Exercise 1 LAQ        Exercise 2 Standing B/L calf raises        Exercise 3 Seated B/L calf raises

## 2023-05-22 ENCOUNTER — OFFICE VISIT (OUTPATIENT)
Dept: PHYSICAL THERAPY | Facility: CLINIC | Age: 58
End: 2023-05-22

## 2023-05-22 DIAGNOSIS — M76.61 TENDONITIS, ACHILLES, RIGHT: Primary | ICD-10-CM

## 2023-05-22 NOTE — PROGRESS NOTES
Daily Note     Today's date: 2023  Patient name: Nikhil Fish  : 1965  MRN: [id number]  Referring provider: Darius Ellison MD  Dx:   Encounter Diagnosis     ICD-10-CM    1  Tendonitis, Achilles, right  M76 61           Start Time: 1015  Stop Time: 1100  Total time in clinic (min): 45 minutes    Subjective: Patient reported moderate Rt leg soreness following last session and initiation of BFR, but she states she did not have the mid substance calf pain that she had had during previous sessions  Objective: See treatment diary below      Assessment: Tolerated treatment well  Continued with established BFR protocol at 80% occlusion to the Rt LE in accordance with rehabilitation guidelines  Added SL calf raises on leg press and squats during BFR training to further facilitate strengthening on Rt leg, specifically gastroc/soleus complex  Patient demonstrated fatigue post treatment and would benefit from continued PT to address functional limitations and return to PLOF  Plan: Continue per plan of care  Advance BFR next session  Insurance:  AMA/CMS Eval/ Kalina Haro #/ Referral # Total units  Start date  Expiration date Extension  Visit limitation? PT only or  PT+OT? Co-Insurance   [facility]) 3/27/2023 No auth needed     30 PCY PT  $15 copay                                                     POC Start Date POC Expiration Date Signed POC?   3/27/2023 12 weeks - 2023  Y      Date               Units:  Used               Authed:  Remaining                  Precautions:   Past Medical History:   Diagnosis Date   • Anxiety    • Arthritis    • Degenerative disc disease, cervical     2 cervical and 2 lumbar, goes for spinal decompression therapy 2x monthly   • Depression    • History of herniated intervertebral disc 10/19/2005   • Hypertension        Patient provided verbal consent to treatment plan and recommended interventions      Date 5/8/23 5/12/23 5/15/23 5/19/23 5/22/23   Visit "Number 12 13 14 15 16   FOTO Tracking        Manual        Ankle mobs        Forefoot mobs        GT mobs Distraction 3x30 sec; distraction w/ flex/ext x 5 min    Toe spacer Jhaveri taping for R hallux valgus                      TherEx        Active POP  Recumbent 10 mins RB x 8 min RB x 8 min RB x 8 min   Ankle PROM        Ankle AROM        Ankle TB 4-way        Hip ext        SAQ        SLR        SL hip abd        Squat with 2\" block under L LE        Weight shifting to R LE         Bridges        DF in standing on step   Step stretch into DF (no mob) 10 x 5 sec     Runner's stretch Knee straight 5x15 sec; knee bent 5x15 sec  Knee straight 5x15 sec; knee bent 5x15 sec             Neuro Re-Ed        Ankle alphabet        Fitter In standing DF/PF and inv/ev x20 each (L2) In standing DF/PF and inv/ev x20 each (L2)      BAPS        Foot intrinsics  Heel-toe walking, even steps, faster genny 100'x2 Heel-toe walking, even steps, faster genny 100'x2     Heel raises with iso inversion into ball 2x10 Toe walking 8'x5 laps    Lunges focus on RLE PF to return x14 held     Eccentric ant heel taps 2\" x30  In mirror x20       Hip burners 10 x 5 sec hold each 10 x 5 sec hold each 10 x 5 sec hold each     Balance beam  FW/BW x5 each       Eccentric SL heel raises Off step - up with two, eccentric lower with one x10  Off step - up with two, eccentric lower with one x10  Off step - up with two, eccentric lower with one 2x10      SL calf raise on leg press   3x8 45#     BFR protocol (x30, x15, x15, x15)    See below w/ therapeutic rest x 25 min See below w/ therapeutic rest x 35 min           TherAct        Patient education Updated HEP (towel soleus stretch, eccentric SL calf raises)   education on BFR and expectations, risks, etc x 10 min                                    Gait Training        AD training        Weight shifting                Modalities        Ice          BFR Protocol:    5/19/23 5/22/23       Exercise 1 " LAQ LAQ       Exercise 2 Standing B/L calf raises Standing B/L calf raises       Exercise 3 Seated B/L calf raises  Seated B/L calf raises       Exercise 4  Calf raises on leg press (25#)       Exercise 5  Squats

## 2023-05-23 ENCOUNTER — OFFICE VISIT (OUTPATIENT)
Dept: OBGYN CLINIC | Facility: CLINIC | Age: 58
End: 2023-05-23

## 2023-05-23 VITALS
DIASTOLIC BLOOD PRESSURE: 81 MMHG | HEIGHT: 66 IN | SYSTOLIC BLOOD PRESSURE: 116 MMHG | BODY MASS INDEX: 34.72 KG/M2 | HEART RATE: 94 BPM | WEIGHT: 216 LBS

## 2023-05-23 DIAGNOSIS — M92.61 HAGLUND'S DEFORMITY OF RIGHT HEEL: Primary | ICD-10-CM

## 2023-05-23 NOTE — PROGRESS NOTES
"      SIDDHARTHA Batres  Attending, Orthopaedic Surgery  Foot and Ankle  [facility]      ORTHOPAEDIC FOOT AND ANKLE POST-OP VISIT     Procedure:      Right Adrienne's resection with retrocalcaneal bursectomy and achilles debridement and repair       Date of surgery:   3/2/23      PLAN  1  Weightbearing Status- WBAT operative extremity  2  DVT prophylaxis- Completed  3  Continue PT/HEP as directed  4  Pain control- OTC pain medication  5  RTC in 3 months  6  Xrays needed next visit - No    History of Present Illness:   Chief Complaint:   Chief Complaint   Patient presents with   • Right Ankle - Follow-up     Edgar Barillas is a 62 y o  female who is being seen for 3 month post-operative visit for the above procedure  Pain is well controlled and the patient has successfully transitioned to OTC pain medicines  she has completed ASA 325mg BID for DVT prophylaxis  Patient has been WBAT in a Supportive sneaker      Review of Systems:  General- denies fever/chills  Respiratory- denies cough or SOB  Cardio- denies chest pain or palpitations  GI- denies abdominal pain  Musculoskeletal- Negative except noted above  Skin- denies rashes or wounds    Physical Exam:   /81   Pulse 94   Ht 5' 6\" (1 676 m)   Wt 98 kg (216 lb)   BMI 34 86 kg/m²   General/Constitutional: No apparent distress: well-nourished and well developed  Eyes: normal ocular motion  Lymphatic: No appreciable lymphadenopathy  Respiratory: Non-labored breathing  Vascular: No edema, swelling or tenderness, except as noted in detailed exam   Integumentary: No impressive skin lesions present, except as noted in detailed exam   Neuro: No ataxia or tremors noted  Psych: Normal mood and affect, oriented to person, place and time  Appropriate affect  Musculoskeletal: Normal, except as noted in detailed exam and in HPI  Examination    right        Incision Clean, dry, intact  Sutures Previously removed      Ecchymosis none  " Swelling mild    Sensation Intact to light touch throughout sural, saphenous, superficial peroneal, deep peroneal and medial/lateral plantar nerve distributions  Creola-Bety 5 07 filament (10g) testing deferred  Cardiovascular Brisk capillary refill < 2 seconds,intact DP and PT pulses    Special Tests None      Imaging Studies:   No new images    Scribe Attestation    I,:  Pieter Coburn am acting as a scribe while in the presence of the attending physician :       I,:  Ramsey Crystal MD personally performed the services described in this documentation    as scribed in my presence :              Barth Bach Lachman, MD  Foot & Ankle Surgery   Department of [address] [place] [address]      I personally performed the service  Barth Bach Lachman, MD

## 2023-05-26 ENCOUNTER — OFFICE VISIT (OUTPATIENT)
Dept: PHYSICAL THERAPY | Facility: CLINIC | Age: 58
End: 2023-05-26

## 2023-05-26 DIAGNOSIS — M76.61 TENDONITIS, ACHILLES, RIGHT: Primary | ICD-10-CM

## 2023-05-26 NOTE — PROGRESS NOTES
Daily Note     Today's date: 2023  Patient name: Gogo Le  : 1965  MRN: [id number]  Referring provider: Anderson Maki MD  Dx:   Encounter Diagnosis     ICD-10-CM    1  Tendonitis, Achilles, right  M76 61           Start Time: 1015  Stop Time: 1100  Total time in clinic (min): 45 minutes    Subjective: Patient followed up with surgeon who is pleased with overall progress  She did reports some increased knee pain following the squats last session  Objective: See treatment diary below      Assessment: Tolerated treatment well  Continued with established BFR protocol at 80% occlusion to the Rt LE in accordance with rehabilitation guidelines  Transitioned from squats to leg press since patient reported increased knee pain following last session  Also added eccentric calf raises off step in an effort to advance calf strengthening program in order to transition off BFR  Patient demonstrated fatigue post treatment and would benefit from continued PT to address functional limitations and return to PLOF  Plan: Continue per plan of care  Advance BFR next session  Insurance:  A/CMS Niraj/ Mayank Juarez #/ Referral # Total units  Start date  Expiration date Extension  Visit limitation? PT only or  PT+OT? Co-Insurance   [facility] AND [facility]) 3/27/2023 No auth needed     30 PCY PT  $15 copay                                                     POC Start Date POC Expiration Date Signed POC?   3/27/2023 12 weeks - 2023  Y      Date               Units:  Used               Authed:  Remaining                  Precautions:   Past Medical History:   Diagnosis Date   • Anxiety    • Arthritis    • Degenerative disc disease, cervical     2 cervical and 2 lumbar, goes for spinal decompression therapy 2x monthly   • Depression    • History of herniated intervertebral disc 10/19/2005   • Hypertension        Patient provided verbal consent to treatment plan and recommended interventions      Date 23 "5/15/23 5/19/23 5/22/23 5/26/23   Visit Number 13 14 15 16 17   FOTO Tracking        Manual        Ankle mobs        Forefoot mobs        GT mobs Jhaveri taping for R hallux valgus                       TherEx        Active POP Recumbent 10 mins RB x 8 min RB x 8 min RB x 8 min RB x 10 min   Ankle PROM        Ankle AROM        Ankle TB 4-way        Hip ext        SAQ        SLR        SL hip abd        Squat with 2\" block under L LE        Weight shifting to R LE         Bridges        DF in standing on step  Step stretch into DF (no mob) 10 x 5 sec      Runner's stretch  Knee straight 5x15 sec; knee bent 5x15 sec              Neuro Re-Ed        Ankle alphabet        Fitter In standing DF/PF and inv/ev x20 each (L2)       BAPS        Foot intrinsics Heel-toe walking, even steps, faster genny 100'x2 Heel-toe walking, even steps, faster genny 100'x2      Heel raises with iso inversion into ball Toe walking 8'x5 laps    Lunges focus on RLE PF to return x14 held      Eccentric ant heel taps        Hip burners 10 x 5 sec hold each 10 x 5 sec hold each      Balance beam         Eccentric SL heel raises Off step - up with two, eccentric lower with one x10  Off step - up with two, eccentric lower with one 2x10       SL calf raise on leg press  3x8 45#      BFR protocol (x30, x15, x15, x15)   See below w/ therapeutic rest x 25 min See below w/ therapeutic rest x 35 min See below w/ therapeutic rest x 35 min           TherAct        Patient education   education on BFR and expectations, risks, etc x 10 min                                     Gait Training        AD training        Weight shifting                Modalities        Ice          BFR Protocol:    5/19/23 5/22/23 5/26/23      Exercise 1 LAQ LAQ LAQ      Exercise 2 Standing B/L calf raises Standing B/L calf raises Standing B/L calf raises      Exercise 3 Seated B/L calf raises  Seated B/L calf raises Seated B/L calf raises      Exercise 4  Calf raises on leg " press (25#) Calf raises on leg press (25#)      Exercise 5  Squats Leg press (95#        B/L eccentric calf raises off step

## 2023-05-30 ENCOUNTER — OFFICE VISIT (OUTPATIENT)
Dept: PHYSICAL THERAPY | Facility: CLINIC | Age: 58
End: 2023-05-30

## 2023-05-30 DIAGNOSIS — M76.61 TENDONITIS, ACHILLES, RIGHT: Primary | ICD-10-CM

## 2023-05-30 NOTE — PROGRESS NOTES
Daily Note     Today's date: 2023  Patient name: Carol Olvera  : 1965  MRN: [id number]  Referring provider: Lian Raya MD  Dx:   Encounter Diagnosis     ICD-10-CM    1  Tendonitis, Achilles, right  M76 61           Start Time: 1015  Stop Time: 1100  Total time in clinic (min): 45 minutes    Subjective: Patient reports mild soreness following last session, but no pain  Objective: See treatment diary below      Assessment: Tolerated treatment well  Continue with established POC utilizing BFR at established protocol  We will begin to wean BFR at next week in order to resume high-level functional activities and assess for response  Next session, wean single joint BFR activities and further introduce functional activities  Patient demonstrated fatigue post treatment and would benefit from continued PT to improve functional mobility and return to PLOF  Plan: Continue per plan of care  Insurance:  AMA/CMS Eval/ Alcario Binning #/ Referral # Total units  Start date  Expiration date Extension  Visit limitation? PT only or  PT+OT? Co-Insurance   [facility] AND [facility]) 3/27/2023 No auth needed     30 PCY PT  $15 copay                                                     POC Start Date POC Expiration Date Signed POC?   3/27/2023 12 weeks - 2023  Y      Date               Units:  Used               Authed:  Remaining                  Precautions:   Past Medical History:   Diagnosis Date   • Anxiety    • Arthritis    • Degenerative disc disease, cervical     2 cervical and 2 lumbar, goes for spinal decompression therapy 2x monthly   • Depression    • History of herniated intervertebral disc 10/19/2005   • Hypertension        Patient provided verbal consent to treatment plan and recommended interventions      Date 5/15/23 5/19/23 5/22/23 5/26/23 5/30/23   Visit Number 14 15 16 17 18   FOTO Tracking        Manual        Ankle mobs        Forefoot mobs        GT mobs                        TherEx "  Active POP RB x 8 min RB x 8 min RB x 8 min RB x 10 min RB x 10 min   Ankle PROM        Ankle AROM        Ankle TB 4-way        Hip ext        SAQ        SLR        SL hip abd        Squat with 2\" block under L LE        Weight shifting to R LE         Bridges        DF in standing on step Step stretch into DF (no mob) 10 x 5 sec       Runner's stretch Knee straight 5x15 sec; knee bent 5x15 sec               Neuro Re-Ed        Ankle alphabet        Fitter        BAPS        Foot intrinsics Heel-toe walking, even steps, faster genny 100'x2       Heel raises with iso inversion into ball held       Eccentric ant heel taps        Hip burners 10 x 5 sec hold each       Balance beam         Eccentric SL heel raises Off step - up with two, eccentric lower with one 2x10        SL calf raise on leg press 3x8 45#       BFR protocol (x30, x15, x15, x15)  See below w/ therapeutic rest x 25 min See below w/ therapeutic rest x 35 min See below w/ therapeutic rest x 35 min See below w/ therapeutic rest x 35 min           TherAct        Patient education  education on BFR and expectations, risks, etc x 10 min                                      Gait Training        AD training        Weight shifting                Modalities        Ice          BFR Protocol:    5/19/23 5/22/23 5/26/23 5/30/23     Exercise 1 LAQ LAQ LAQ LAQ     Exercise 2 Standing B/L calf raises Standing B/L calf raises Standing B/L calf raises Standing B/L calf raises     Exercise 3 Seated B/L calf raises  Seated B/L calf raises Seated B/L calf raises Seated B/L calf raises     Exercise 4  Calf raises on leg press (25#) Calf raises on leg press (25#) Calf raises on leg press (25#)     Exercise 5  Squats Leg press (95# Leg press (95#    Exercise 6   B/L eccentric calf raises off step  B/L eccentric calf raises off step                                        "

## 2023-06-02 ENCOUNTER — EVALUATION (OUTPATIENT)
Dept: PHYSICAL THERAPY | Facility: CLINIC | Age: 58
End: 2023-06-02

## 2023-06-02 DIAGNOSIS — M76.61 TENDONITIS, ACHILLES, RIGHT: Primary | ICD-10-CM

## 2023-06-02 NOTE — PROGRESS NOTES
Daily Note     Today's date: 2023  Patient name: Mary Alice Powell  : 1965  MRN: [id number]  Referring provider: Tran Escalante MD  Dx:   Encounter Diagnosis     ICD-10-CM    1  Tendonitis, Achilles, right  M76 61           Start Time: 1015  Stop Time: 1100  Total time in clinic (min): 45 minutes    Subjective: Patient is progressing well  She states mild lateral ankle swelling by end of day  Objective: See treatment diary below  Reassessment performed today  Short Term Goals (2 weeks):  MET  1  Patient will be independent with basic HEP  2  Patient will report >50% reduction in pain  3  Patient will demonstrate >1/3 improvement in MMT grade as applicable  4  Patient will increase R ankle ROM to normalize gait for functional mobility  5  Patient will demonstrate improved sensation on superior aspect of distal foot for increased comfort during activity and rest    Long Term Goals (12 weeks):   1  Patient will be independent in a comprehensive home exercise program MET  2  Patient FOTO score will improve to 52/100 MET  3  Patient will self-report >75% improvement in function MET  4  Patient will walk with minimal deviations to allow access to community and prepare for return to work/leisure PROGRESSING  5  Patient will demonstrate improved ability to WB and decrease reliance on RW to promote improve functional mobility MET    New Goals:  1  Patient will be able to perform a SL heel raise  2   Patient will be able to successful return to gym based fitness program    Pain   3/27/2023 5/4/23 6/2/23    Current 3/10 1/10 0-1/10    Best 3/10 1/10 010    Worst 5/10 3/10 2/10     Patient Specific Functional Scale  0 is unable, 10 is no limitations  Activity 3/27/2023 5/4/23 6/2/23   Stairs 5/10 7/10 8/10   Standing 6/10 9/10 9/10   Walking 6/10 8/10 8/10     LE MMT   3/27/2023 5/4/23 6/2/23    LEFT RIGHT  RIGHT RIGHT    Hip Flexion 4-/5 4-/5 5/5     Hip Abduction 4/5 4/5 5/5     Hip Adduction 4/5 4/5 5/5           Knee Flexion 4/5 4/5 5/5     Knee Extension 4-/5 4-/5 5/5          Ankle DF 4/5 3+/5 4+/5 5/5    Ankle PF 4/5 3/5 4+/5 Unable to perform SL calf raise    Ankle Inversion 4/5 3+/5 4+/5 5/5    Ankle Eversion 4/5 3+/5 4+/5 5/5    Great Toe Extension 4/5 4/5 5/5 5/5     (*) = reproduction of patients reported pain      LE ROM   3/27/2023 5/4/23 6/2/23    LEFT RIGHT  RIGHT RIGHT    Hip Flexion [facility]      Hip Extension [facility] WFL           Knee Flexion [facility] WFL      Knee Extension [facility] WFL           Ankle DF WFL -30 deg 10 deg 13 deg    Ankle PF WFL 55 deg 55 deg 55 deg    Ankle Inversion WFL 25 deg 40 deg 40 deg    Ankle Eversion WFL 20 deg 25 deg 30 deg     (*) = reproduction of patients reported pain    Sensation  Light touch: intact bilaterally, mild decreased sensation in arch and GT    Functional Assessment  Stair Negotiation:   Ascend: reciprocal    Descend: reciprocal  Gait Assessment: slight gait deviation with decreased stance time on Rt, but no pain      Assessment: Jaspreet Lind has attended 11 visits over 5 weeks  They have made notable improvements in range of motion and ambulation tolerance  Despite improvements, patient continues to have limitations in ambulation tolerance, weightbearing tolerance, and functional strength as noted below  These impairments continue to limited the patient's ability to return to gym based strengthening activities, specifically running and jumping secondary to inability to perform SL heel raises  Patient has made progress towards short term goals and long term goals  They would benefit from continued skilled physical therapy 2x per week for 6 weeks to address these impairments and functional limitations in order to return to OF  Plan: Continue per plan of care  2x per week for 6 weeks  POC start date: 6/2/23  POC expiration date:         Insurance:  AMA/CMS Kathrineal/ Melvi Wise #/ Referral # Total units  Start date  Expiration date Extension  Visit "limitation? PT only or  PT+OT? Co-Insurance   CMS ([facility]) 3/27/2023 No auth needed     30 PCY PT  $15 copay    6/2/23                                                 POC Start Date POC Expiration Date Signed POC?   3/27/2023 12 weeks - 6/19/2023  Y   6/2/2023 6 weeks - 7/14/2023       Date               Units:  Used               Authed:  Remaining                  Precautions:   Past Medical History:   Diagnosis Date   • Anxiety    • Arthritis    • Degenerative disc disease, cervical     2 cervical and 2 lumbar, goes for spinal decompression therapy 2x monthly   • Depression    • History of herniated intervertebral disc 10/19/2005   • Hypertension        Patient provided verbal consent to treatment plan and recommended interventions      Date 5/19/23 5/22/23 5/26/23 5/30/23 6/2/23   Visit Number [id number]   FOTO Tracking        Manual        Ankle mobs        Forefoot mobs        GT mobs                        TherEx        Active POP RB x 8 min RB x 8 min RB x 10 min RB x 10 min RB x 8 min   Ankle PROM        Ankle AROM        Ankle TB 4-way        Hip ext        SAQ        SLR        SL hip abd        Squat with 2\" block under L LE        Weight shifting to R LE         Bridges        DF in standing on step        Runner's stretch                Neuro Re-Ed        Ankle alphabet        Fitter        BAPS        Foot intrinsics        Heel raises with iso inversion into ball        Eccentric ant heel taps        Hip burners        Balance beam         Eccentric SL heel raises        SL calf raise on leg press        BFR protocol (x30, x15, x15, x15) See below w/ therapeutic rest x 25 min See below w/ therapeutic rest x 35 min See below w/ therapeutic rest x 35 min See below w/ therapeutic rest x 35 min See below w/ therapeutic rest x 35 min           TherAct        Patient education education on BFR and expectations, risks, etc x 10 min    Reassess x 10 min                                   Gait Training      " AD training        Weight shifting                Modalities        Ice          BFR Protocol:    5/19/23 5/22/23 5/26/23 5/30/23     Exercise 1 LAQ LAQ LAQ LAQ     Exercise 2 Standing B/L calf raises Standing B/L calf raises Standing B/L calf raises Standing B/L calf raises     Exercise 3 Seated B/L calf raises  Seated B/L calf raises Seated B/L calf raises Seated B/L calf raises     Exercise 4  Calf raises on leg press (25#) Calf raises on leg press (25#) Calf raises on leg press (25#)     Exercise 5  Squats Leg press (95# Leg press (95#    Exercise 6   B/L eccentric calf raises off step  B/L eccentric calf raises off step

## 2023-06-05 ENCOUNTER — OFFICE VISIT (OUTPATIENT)
Dept: PHYSICAL THERAPY | Facility: CLINIC | Age: 58
End: 2023-06-05
Payer: COMMERCIAL

## 2023-06-05 DIAGNOSIS — M76.61 TENDONITIS, ACHILLES, RIGHT: Primary | ICD-10-CM

## 2023-06-05 PROCEDURE — 97112 NEUROMUSCULAR REEDUCATION: CPT

## 2023-06-05 NOTE — PROGRESS NOTES
Daily Note     Today's date: 2023  Patient name: Nancy Rivera  : 1965  MRN: [id number]  Referring provider: Bao Dockery MD  Dx:   Encounter Diagnosis     ICD-10-CM    1  Tendonitis, Achilles, right  M76 61           Start Time: 1015  Stop Time: 1100  Total time in clinic (min): 45 minutes    Subjective: Patient reports mild soreness following last session  Objective: See treatment diary below      Assessment: Tolerated treatment fair  Attempted to wean BFR today and resume higher level activities not able to perform earlier secondary to pain  Patient was able to complete activities without significant pain, but continues to have notable difficulty with inability to rise in a SL heel raise  Patient will continue to work on this at home, but understands that she had significant calf atrophy leading up to surgery  Patient demonstrated fatigue post treatment and would benefit from continued PT to address functional deficits and return to PLOF  Plan: Continue per plan of care  Insurance:  A/CMS Niraj/ Josi Fierro #/ Referral # Total units  Start date  Expiration date Extension  Visit limitation? PT only or  PT+OT? Co-Insurance   CMS ([facility]) 3/27/2023 No auth needed     30 PCY PT  $15 copay    23                                                 POC Start Date POC Expiration Date Signed POC?   3/27/2023 12 weeks - 2023  Y   2023 6 weeks - 2023       Date               Units:  Used               Authed:  Remaining                  Precautions:   Past Medical History:   Diagnosis Date   • Anxiety    • Arthritis    • Degenerative disc disease, cervical     2 cervical and 2 lumbar, goes for spinal decompression therapy 2x monthly   • Depression    • History of herniated intervertebral disc 10/19/2005   • Hypertension        Patient provided verbal consent to treatment plan and recommended interventions      Date 23   Visit Number [id number]   FOTO Tracking        Manual        Ankle mobs        Forefoot mobs        GT mobs                        TherEx        Active POP RB x 8 min RB x 10 min RB x 10 min RB x 8 min RB x 8 min   Ankle PROM        Ankle AROM        Leg press     2x15 105# SS with UL calf raises   Weight shifting to R LE      BL calf raise with WS towards Rt and hold 15 x max hold   Bridges        DF in standing on step        Runner's stretch     10 x 10-15 sec           Neuro Re-Ed        Ankle alphabet        Fitter        BAPS        Foot intrinsics        Heel raises with iso inversion into ball        Eccentric ant heel taps        Hip burners        Balance beam         Eccentric SL heel raises     Eccentric lower with WS 2x15    SL calf raise on leg press     2x15 45# SS with BL leg press   BFR protocol (x30, x15, x15, x15) See below w/ therapeutic rest x 35 min See below w/ therapeutic rest x 35 min See below w/ therapeutic rest x 35 min See below w/ therapeutic rest x 35 min    Sled pushes     55# + sled push/pull 2x30ft   iso SL heel raises     15 x max hold           TherAct        Patient education    Reassess x 10 min                                    Gait Training        AD training        Weight shifting                Modalities        Ice          BFR Protocol:    5/19/23 5/22/23 5/26/23 5/30/23     Exercise 1 LAQ LAQ LAQ LAQ     Exercise 2 Standing B/L calf raises Standing B/L calf raises Standing B/L calf raises Standing B/L calf raises     Exercise 3 Seated B/L calf raises  Seated B/L calf raises Seated B/L calf raises Seated B/L calf raises     Exercise 4  Calf raises on leg press (25#) Calf raises on leg press (25#) Calf raises on leg press (25#)     Exercise 5  Squats Leg press (95# Leg press (95#    Exercise 6   B/L eccentric calf raises off step  B/L eccentric calf raises off step

## 2023-06-09 ENCOUNTER — OFFICE VISIT (OUTPATIENT)
Dept: PHYSICAL THERAPY | Facility: CLINIC | Age: 58
End: 2023-06-09
Payer: COMMERCIAL

## 2023-06-09 DIAGNOSIS — M76.61 TENDONITIS, ACHILLES, RIGHT: Primary | ICD-10-CM

## 2023-06-09 PROCEDURE — 97112 NEUROMUSCULAR REEDUCATION: CPT | Performed by: PHYSICAL THERAPIST

## 2023-06-09 PROCEDURE — 97110 THERAPEUTIC EXERCISES: CPT | Performed by: PHYSICAL THERAPIST

## 2023-06-09 NOTE — PROGRESS NOTES
Daily Note     Today's date: 2023  Patient name: Esteban Tang  : 1965  MRN: [id number]  Referring provider: Roberto Glass MD  Dx:   Encounter Diagnosis     ICD-10-CM    1  Tendonitis, Achilles, right  M76 61           Start Time: 1015  Stop Time: 1100  Total time in clinic (min): 45 minutes    Subjective: Patient reports improvement in baseline soreness reported last session  She has been adherent to HEP  Objective: See treatment diary below      Assessment: Tolerated treatment well  Continued with established POC emphasizing eccentric contraction and SL weight acceptance within tolerance  Patient reported fatigue, but no notable soreness to end session today  Patient demonstrated fatigue post treatment and would benefit from continued PT to address functional mobility and strength in order to return to PLOF  Plan: Continue per plan of care  Patient will return Friday for 1 appt prior to vacation for 2 weeks  Insurance:  [place]/CMS Kathrineal/ Denzel Manchester #/ Referral # Total units  Start date  Expiration date Extension  Visit limitation? PT only or  PT+OT? Co-Insurance   CMS ([facility]) 3/27/2023 No auth needed     30 PCY PT  $15 copay    23                         POC Start Date POC Expiration Date Signed POC?   3/27/2023 12 weeks - 2023  Y   2023 6 weeks - 2023       Date           Units:  Used           Authed:  Remaining              Precautions:   Past Medical History:   Diagnosis Date   • Anxiety    • Arthritis    • Degenerative disc disease, cervical     2 cervical and 2 lumbar, goes for spinal decompression therapy 2x monthly   • Depression    • History of herniated intervertebral disc 10/19/2005   • Hypertension        Patient provided verbal consent to treatment plan and recommended interventions      Date 23   Visit Number    FOTO Tracking        Manual        Ankle mobs                TherEx "  Active POP RB x 10 min RB x 8 min RB x 8 min Rec  bike 8' RB x 8 min   Leg press   2x15 105# SS with UL calf raises 2x15 105# SS with UL calf raises 2x15 115# SS with UL calf raises   Weight shifting to R LE    BL calf raise with WS towards Rt and hold 15 x max hold BL calf raise with WS towards Rt and hold 10*15'' hold BL calf raise with WS towards Rt and hold 10*15'' hold   Runner's stretch   10 x 10-15 sec 10*10''     Reverse lunge TRX    2*10 RLE fwd 2x10 each                           Neuro Re-Ed        Eccentric SL heel raises   Eccentric lower with WS 2x15  Eccentric lower with WS 3*10, 6\" lower Eccentric lower with WS 3*10, 6\" lower   SL calf raise on leg press   2x15 45# SS with BL leg press 2x15 45# SS with BL leg press 2x15 55# SS with BL leg press   BFR protocol (x30, x15, x15, x15) See below w/ therapeutic rest x 35 min See below w/ therapeutic rest x 35 min      Sled pushes   55# + sled push/pull 2x30ft     iso SL heel raises   15 x max hold             TherAct        Patient education  Reassess x 10 min                      Gait Training                Modalities        Ice          BFR Protocol:    5/19/23 5/22/23 5/26/23 5/30/23     Exercise 1 LAQ LAQ LAQ LAQ     Exercise 2 Standing B/L calf raises Standing B/L calf raises Standing B/L calf raises Standing B/L calf raises     Exercise 3 Seated B/L calf raises  Seated B/L calf raises Seated B/L calf raises Seated B/L calf raises     Exercise 4  Calf raises on leg press (25#) Calf raises on leg press (25#) Calf raises on leg press (25#)     Exercise 5  Squats Leg press (95# Leg press (95#    Exercise 6   B/L eccentric calf raises off step  B/L eccentric calf raises off step                                              "

## 2023-06-09 NOTE — PROGRESS NOTES
Daily Note     Today's date: 2023  Patient name: Chen Pinzon  : 1965  MRN: [id number]  Referring provider: Leanna Malone MD  Dx:   Encounter Diagnosis     ICD-10-CM    1  Tendonitis, Achilles, right  M76 61           Start Time: 1015  Stop Time: 1100  Total time in clinic (min): 45 minutes    Subjective: Patient reports some soreness entering treatment after driving a good amount yesterday  Patient reports symptoms are bordering on pain  1 on 1 with PT for 23 minutes  Remaining time independent fitness program     Objective: See treatment diary below    Assessment: Tolerated treatment fair  Patient reported Achilles aggravation with most calf based strengthening exercises  Patient educated that it is normal to have difficulty rising up on one leg at this time  Plan: Continue per plan of care  Insurance:  AMA/CMS Eval/ Markus Miranda #/ Referral # Total units  Start date  Expiration date Extension  Visit limitation? PT only or  PT+OT? Co-Insurance   CMS ([facility]) 3/27/2023 No auth needed     30 PCY PT  $15 copay    23                         POC Start Date POC Expiration Date Signed POC?   3/27/2023 12 weeks - 2023  Y   2023 6 weeks - 2023       Date           Units:  Used           Authed:  Remaining              Precautions:   Past Medical History:   Diagnosis Date   • Anxiety    • Arthritis    • Degenerative disc disease, cervical     2 cervical and 2 lumbar, goes for spinal decompression therapy 2x monthly   • Depression    • History of herniated intervertebral disc 10/19/2005   • Hypertension        Patient provided verbal consent to treatment plan and recommended interventions      Date 23   Visit Number 17    FOTO Tracking        Manual        Ankle mobs                TherEx        Active POP RB x 10 min RB x 10 min RB x 8 min RB x 8 min Rec  bike 8'   Leg press    2x15 105# SS with UL calf raises 2x15 105# SS "with UL calf raises   Weight shifting to R LE     BL calf raise with WS towards Rt and hold 15 x max hold BL calf raise with WS towards Rt and hold 10*15'' hold   Runner's stretch    10 x 10-15 sec 10*10''    Reverse lunge TRX     2*10 RLE fwd                           Neuro Re-Ed        Eccentric SL heel raises    Eccentric lower with WS 2x15  Eccentric lower with WS 3*10, 6\" lower   SL calf raise on leg press    2x15 45# SS with BL leg press 2x15 45# SS with BL leg press   BFR protocol (x30, x15, x15, x15) See below w/ therapeutic rest x 35 min See below w/ therapeutic rest x 35 min See below w/ therapeutic rest x 35 min     Sled pushes    55# + sled push/pull 2x30ft    iso SL heel raises    15 x max hold            TherAct        Patient education   Reassess x 10 min                     Gait Training                Modalities        Ice          BFR Protocol:    5/19/23 5/22/23 5/26/23 5/30/23     Exercise 1 LAQ LAQ LAQ LAQ     Exercise 2 Standing B/L calf raises Standing B/L calf raises Standing B/L calf raises Standing B/L calf raises     Exercise 3 Seated B/L calf raises  Seated B/L calf raises Seated B/L calf raises Seated B/L calf raises     Exercise 4  Calf raises on leg press (25#) Calf raises on leg press (25#) Calf raises on leg press (25#)     Exercise 5  Squats Leg press (95# Leg press (95#    Exercise 6   B/L eccentric calf raises off step  B/L eccentric calf raises off step                                            "

## 2023-06-12 ENCOUNTER — OFFICE VISIT (OUTPATIENT)
Dept: PHYSICAL THERAPY | Facility: CLINIC | Age: 58
End: 2023-06-12
Payer: COMMERCIAL

## 2023-06-12 DIAGNOSIS — I10 BENIGN ESSENTIAL HYPERTENSION: ICD-10-CM

## 2023-06-12 DIAGNOSIS — M76.61 TENDONITIS, ACHILLES, RIGHT: Primary | ICD-10-CM

## 2023-06-12 PROCEDURE — 97112 NEUROMUSCULAR REEDUCATION: CPT

## 2023-06-12 PROCEDURE — 97110 THERAPEUTIC EXERCISES: CPT

## 2023-06-12 RX ORDER — VERAPAMIL HYDROCHLORIDE 120 MG/1
CAPSULE, EXTENDED RELEASE ORAL
Qty: 90 CAPSULE | Refills: 0 | Status: SHIPPED | OUTPATIENT
Start: 2023-06-12

## 2023-06-13 ENCOUNTER — APPOINTMENT (OUTPATIENT)
Dept: PHYSICAL THERAPY | Facility: CLINIC | Age: 58
End: 2023-06-13
Payer: COMMERCIAL

## 2023-06-15 ENCOUNTER — OFFICE VISIT (OUTPATIENT)
Dept: PHYSICAL THERAPY | Facility: CLINIC | Age: 58
End: 2023-06-15
Payer: COMMERCIAL

## 2023-06-15 DIAGNOSIS — M76.61 TENDONITIS, ACHILLES, RIGHT: Primary | ICD-10-CM

## 2023-06-15 PROCEDURE — 97110 THERAPEUTIC EXERCISES: CPT | Performed by: PHYSICAL THERAPIST

## 2023-06-15 PROCEDURE — 97112 NEUROMUSCULAR REEDUCATION: CPT | Performed by: PHYSICAL THERAPIST

## 2023-06-15 NOTE — PROGRESS NOTES
Daily Note     Today's date: 6/15/2023  Patient name: Ray Foster  : 1965  MRN: [id number]  Referring provider: Shey Bolaños MD  Dx:   Encounter Diagnosis     ICD-10-CM    1  Tendonitis, Achilles, right  M76 61                      Subjective: Patient reports some soreness entering treatment today  Reports that her big toe can bother her at times  1 on 1 with PT for 23 minutes  Remaining time independent fitness program     Objective: See treatment diary below    Assessment: Tolerated treatment well  Fatigue reported with calf raise weight shift isometric  HEP progressed at this time  Plan: Continue per plan of care  Exercises progressed as patient will be away for next 2 weeks  Insurance:  AMA/CMS Eval/ Tomasz Coto #/ Referral # Total units  Start date  Expiration date Extension  Visit limitation? PT only or  PT+OT? Co-Insurance   CMS ([facility]) 3/27/2023 No auth needed     30 PCY PT  $15 copay    23                         POC Start Date POC Expiration Date Signed POC?   3/27/2023 12 weeks - 2023  Y   2023 6 weeks - 2023       Date           Units:  Used           Authed:  Remaining              Precautions:   Past Medical History:   Diagnosis Date   • Anxiety    • Arthritis    • Degenerative disc disease, cervical     2 cervical and 2 lumbar, goes for spinal decompression therapy 2x monthly   • Depression    • History of herniated intervertebral disc 10/19/2005   • Hypertension        Patient provided verbal consent to treatment plan and recommended interventions  Access Code: [email or web address]  URL: [email or web address]  Date: 06/15/2023  Prepared by: Gibson Rodriguez    Exercises  - Standing Eccentric Heel Raise  - 1 x daily - 7 x weekly - 3 sets - 10 reps  - Gastroc Stretch on Wall  - 3 x daily - 7 x weekly - 1 sets - 5 reps - 10 hold  - Assisted Lunge with TRX®  - 1 x daily - 7 x weekly - 3 sets - 10 reps  - Standing Heel Raises  - 1 x daily - 7 x weekly - 3 sets "- 20 reps    Date 6/2/23 6/5/23 6/9/23 6/12/23 6/15/23   Visit Number 19/30 20/30 21/30 22/30 23/30   FOTO Tracking        Manual                TherEx        Active POP RB x 8 min RB x 8 min Rec  bike 8' RB x 8 min RB 8'    Leg press  2x15 105# SS with UL calf raises 2x15 105# SS with UL calf raises 2x15 115# SS with UL calf raises 3*10 115# SS with UL calf raises   Weight shifting to R LE   BL calf raise with WS towards Rt and hold 15 x max hold BL calf raise with WS towards Rt and hold 10*15'' hold BL calf raise with WS towards Rt and hold 10*15'' hold BL calf raise with WS towards Rt and hold 10*15'' hold   Runner's stretch  10 x 10-15 sec 10*10''   HEP   Reverse lunge TRX   2*10 RLE fwd 2x10 each 2*10 ea                             Neuro Re-Ed        Eccentric SL heel raises  Eccentric lower with WS 2x15  Eccentric lower with WS 3*10, 6\" lower Eccentric lower with WS 3*10, 6\" lower Eccentric lower with WS 3*10, 6\" lower   SL calf raise on leg press  2x15 45# SS with BL leg press 2x15 45# SS with BL leg press 2x15 55# SS with BL leg press 3*10, 65# SS with BL leg press   BFR protocol (x30, x15, x15, x15) See below w/ therapeutic rest x 35 min       Sled pushes  55# + sled push/pull 2x30ft      iso SL heel raises  15 x max hold              TherAct        Patient education Reassess x 10 min                       Gait Training                Modalities        Ice          BFR Protocol:    5/19/23 5/22/23 5/26/23 5/30/23     Exercise 1 LAQ LAQ LAQ LAQ     Exercise 2 Standing B/L calf raises Standing B/L calf raises Standing B/L calf raises Standing B/L calf raises     Exercise 3 Seated B/L calf raises  Seated B/L calf raises Seated B/L calf raises Seated B/L calf raises     Exercise 4  Calf raises on leg press (25#) Calf raises on leg press (25#) Calf raises on leg press (25#)     Exercise 5  Squats Leg press (95# Leg press (95#    Exercise 6   B/L eccentric calf raises off step  B/L eccentric calf raises off step   "

## 2023-06-16 ENCOUNTER — APPOINTMENT (OUTPATIENT)
Dept: PHYSICAL THERAPY | Facility: CLINIC | Age: 58
End: 2023-06-16
Payer: COMMERCIAL

## 2023-07-06 ENCOUNTER — OFFICE VISIT (OUTPATIENT)
Dept: PHYSICAL THERAPY | Facility: CLINIC | Age: 58
End: 2023-07-06
Payer: COMMERCIAL

## 2023-07-06 DIAGNOSIS — M76.61 TENDONITIS, ACHILLES, RIGHT: Primary | ICD-10-CM

## 2023-07-06 PROCEDURE — 97110 THERAPEUTIC EXERCISES: CPT

## 2023-07-06 PROCEDURE — 97112 NEUROMUSCULAR REEDUCATION: CPT

## 2023-07-06 NOTE — PROGRESS NOTES
Daily Note     Today's date: 2023  Patient name: Shakila Baxter  : 1965  MRN: [id number]  Referring provider: Bernardo Munoz MD  Dx:   Encounter Diagnosis     ICD-10-CM    1. Tendonitis, Achilles, right  M76.61           Start Time: 1100  Stop Time: 1145  Total time in clinic (min): 45 minutes    Subjective: Patient reports that she experienced some swelling on the plane and while on vacation, but she was able to walk unlimited with her friends and family. Objective: See treatment diary below      Assessment: Tolerated treatment well. Continued with established POC with minimal advancement to assess for response to exercises since it has been 2 weeks. Patient had no pain during exercises, but there is a notable hitch in her eccentric lowering. Patient would benefit from continued PT to address functional mobility and strength in order to return to PLOF. Plan: Continue per plan of care. Insurance:  AMA/CMS Eval/ Areaa Miss #/ Referral # Total units  Start date  Expiration date Extension  Visit limitation? PT only or  PT+OT? Co-Insurance   CMS ([facility]) 3/27/2023 No auth needed     30 PCY PT  $15 copay    23                         POC Start Date POC Expiration Date Signed POC?   3/27/2023 12 weeks - 2023  Y   2023 6 weeks - 2023  Y      Date           Units:  Used           Authed:  Remaining              Precautions:   Past Medical History:   Diagnosis Date   • Anxiety    • Arthritis    • Degenerative disc disease, cervical     2 cervical and 2 lumbar, goes for spinal decompression therapy 2x monthly   • Depression    • History of herniated intervertebral disc 10/19/2005   • Hypertension        Patient provided verbal consent to treatment plan and recommended interventions.     Date 6/5/23 6/9/23 6/12/23 6/15/23 7/6/23   Visit Number    FOTO Tracking        Manual                TherEx        Active POP RB x 8 min Rec. bike 8' RB x 8 min RB 8'  RB x 8 min   Leg press 2x15 105# SS with UL calf raises 2x15 105# SS with UL calf raises 2x15 115# SS with UL calf raises 3*10 115# SS with UL calf raises 3x15 115# SS with UL calf raises   Weight shifting to R LE  BL calf raise with WS towards Rt and hold 15 x max hold BL calf raise with WS towards Rt and hold 10*15'' hold BL calf raise with WS towards Rt and hold 10*15'' hold BL calf raise with WS towards Rt and hold 10*15'' hold BL calf raise with WS towards Rt and hold 10*15'' hold   Runner's stretch 10 x 10-15 sec 10*10''   HEP    Reverse lunge TRX  2*10 RLE fwd 2x10 each 2*10 ea.                             Neuro Re-Ed        Eccentric SL heel raises Eccentric lower with WS 2x15  Eccentric lower with WS 3*10, 6" lower Eccentric lower with WS 3*10, 6" lower Eccentric lower with WS 3*10, 6" lower Eccentric lower with WS 3*10, 6" lower   SL calf raise on leg press 2x15 45# SS with BL leg press 2x15 45# SS with BL leg press 2x15 55# SS with BL leg press 3*10, 65# SS with BL leg press 3x15, 75# SS with BL leg press   BFR protocol (x30, x15, x15, x15)        Sled pushes 55# + sled push/pull 2x30ft       iso SL heel raises 15 x max hold               TherAct        Patient education                        Gait Training                Modalities        Ice          BFR Protocol:    5/19/23 5/22/23 5/26/23 5/30/23     Exercise 1 LAQ LAQ LAQ LAQ     Exercise 2 Standing B/L calf raises Standing B/L calf raises Standing B/L calf raises Standing B/L calf raises     Exercise 3 Seated B/L calf raises  Seated B/L calf raises Seated B/L calf raises Seated B/L calf raises     Exercise 4  Calf raises on leg press (25#) Calf raises on leg press (25#) Calf raises on leg press (25#)     Exercise 5  Squats Leg press (95# Leg press (95#    Exercise 6   B/L eccentric calf raises off step  B/L eccentric calf raises off step

## 2023-07-18 ENCOUNTER — OFFICE VISIT (OUTPATIENT)
Dept: PHYSICAL THERAPY | Facility: CLINIC | Age: 58
End: 2023-07-18
Payer: COMMERCIAL

## 2023-07-18 DIAGNOSIS — M76.61 TENDONITIS, ACHILLES, RIGHT: Primary | ICD-10-CM

## 2023-07-18 PROCEDURE — 97112 NEUROMUSCULAR REEDUCATION: CPT

## 2023-07-18 PROCEDURE — 97110 THERAPEUTIC EXERCISES: CPT

## 2023-07-18 PROCEDURE — 97530 THERAPEUTIC ACTIVITIES: CPT

## 2023-07-18 NOTE — PROGRESS NOTES
Daily Note     Today's date: 2023  Patient name: Mellissa Mueller  : 1965  MRN: [id number]  Referring provider: Wagner Thomson MD  Dx:   Encounter Diagnosis     ICD-10-CM    1. Tendonitis, Achilles, right  M76.61           Start Time: 1415  Stop Time: 1500  Total time in clinic (min): 45 minutes    Subjective: Patient reports that she returned part time to work. She is now back to highway driving (limtied distance) and reports some soreness swelling by the end of the day. She is frustrated with remaining struggles to get SL calf raise. Objective: See treatment diary below. Short Term Goals (2 weeks):  MET  1. Patient will be independent with basic HEP. 2. Patient will report >50% reduction in pain. 3. Patient will demonstrate >1/3 improvement in MMT grade as applicable  4. Patient will increase R ankle ROM to normalize gait for functional mobility  5. Patient will demonstrate improved sensation on superior aspect of distal foot for increased comfort during activity and rest    Long Term Goals (12 weeks):   1. Patient will be independent in a comprehensive home exercise program MET  2. Patient FOTO score will improve to 52/100 MET  3. Patient will self-report >75% improvement in function MET  4. Patient will walk with minimal deviations to allow access to community and prepare for return to work/leisure PROGRESSING  5. Patient will demonstrate improved ability to WB and decrease reliance on RW to promote improve functional mobility MET    New Goals:  1. Patient will be able to perform a SL heel raise  2. Patient will be able to successful return to gym based fitness program    Laterality trainin% accuracy/1.28 sec round 1, 92% accuracy/1.16 sec round 2   2 point discrimination significantly limited w/ differentiation only present at 40 mm over dorsum of foot and anterior shin; no discrimination or pin prick sensation over GT    Assessment: Tolerated treatment well.  Based on insurance visits remaining, will decrease to 1x every 2 weeks to maximally extend POC window. Patient has consistently struggled with achieving GT extension with active heel raise limiting her functional range of motion. Investigated proprioception, discrimination, and laterality to assess for possible source of neuromuscular deficits as well as assess continued numbness in GT. Patient had significant difficulty with two discrimination and was given this as part of HEP. Patient would benefit from continued PT to address functional mobility and strength in order to return to PLOF. Plan: Continue per plan of care. 1x every 2 weeks for 8 weeks. POC expiration date: 8/19/23     Insurance:  AMA/CMS Eval/ Re-eval Auth #/ Referral # Total units  Start date  Expiration date Extension  Visit limitation? PT only or  PT+OT? Co-Insurance   CMS ([facility]) 3/27/2023 No auth needed     30 PCY PT  $15 copay    6/2/23 7/18/23             POC Start Date POC Expiration Date Signed POC?   3/27/2023 12 weeks - 6/19/2023  Y   6/2/2023 6 weeks - 7/14/2023  Y   7/18/2023 8 weeks - 8/19/2023        Date           Units:  Used           Authed:  Remaining              Precautions:   Past Medical History:   Diagnosis Date   • Anxiety    • Arthritis    • Degenerative disc disease, cervical     2 cervical and 2 lumbar, goes for spinal decompression therapy 2x monthly   • Depression    • History of herniated intervertebral disc 10/19/2005   • Hypertension        Patient provided verbal consent to treatment plan and recommended interventions.     Date 6/9/23 6/12/23 6/15/23 7/6/23 7/18/23   Visit Number 21/30 22/30 23/30 24/30 25/30   FOTO Tracking        Manual                TherEx        Active POP Rec. bike 8' RB x 8 min RB 8'  RB x 8 min    Leg press 2x15 105# SS with UL calf raises 2x15 115# SS with UL calf raises 3*10 115# SS with UL calf raises 3x15 115# SS with UL calf raises 3x15 115# SS with UL calf raises   Weight shifting to R LE BL calf raise with WS towards Rt and hold 10*15'' hold BL calf raise with WS towards Rt and hold 10*15'' hold BL calf raise with WS towards Rt and hold 10*15'' hold BL calf raise with WS towards Rt and hold 10*15'' hold    Runner's stretch 10*10''   HEP     Reverse lunge TRX 2*10 RLE fwd 2x10 each 2*10 ea.                              Neuro Re-Ed        Eccentric SL heel raises Eccentric lower with WS 3*10, 6" lower Eccentric lower with WS 3*10, 6" lower Eccentric lower with WS 3*10, 6" lower Eccentric lower with WS 3*10, 6" lower    SL calf raise on leg press 2x15 45# SS with BL leg press 2x15 55# SS with BL leg press 3*10, 65# SS with BL leg press 3x15, 75# SS with BL leg press 3x15, 75# SS with BL leg press   BFR protocol (x30, x15, x15, x15)        Sled pushes        iso SL heel raises        Laterality      3x foot   Two-point discrimination     Testing x 10 min           TherAct        Patient education     Reassess x 15 min                   Gait Training                Modalities        Ice          BFR Protocol:    5/19/23 5/22/23 5/26/23 5/30/23     Exercise 1 LAQ LAQ LAQ LAQ     Exercise 2 Standing B/L calf raises Standing B/L calf raises Standing B/L calf raises Standing B/L calf raises     Exercise 3 Seated B/L calf raises  Seated B/L calf raises Seated B/L calf raises Seated B/L calf raises     Exercise 4  Calf raises on leg press (25#) Calf raises on leg press (25#) Calf raises on leg press (25#)     Exercise 5  Squats Leg press (95# Leg press (95#     Exercise 6   B/L eccentric calf raises off step  B/L eccentric calf raises off step

## 2023-08-01 ENCOUNTER — OFFICE VISIT (OUTPATIENT)
Dept: PHYSICAL THERAPY | Facility: CLINIC | Age: 58
End: 2023-08-01
Payer: COMMERCIAL

## 2023-08-01 DIAGNOSIS — M76.61 TENDONITIS, ACHILLES, RIGHT: Primary | ICD-10-CM

## 2023-08-01 PROCEDURE — 97112 NEUROMUSCULAR REEDUCATION: CPT

## 2023-08-01 PROCEDURE — 97110 THERAPEUTIC EXERCISES: CPT

## 2023-08-01 NOTE — PROGRESS NOTES
Daily Note     Today's date: 2023  Patient name: Jazmine Talley  : 1965  MRN: [id number]  Referring provider: Daisy Romo MD  Dx:   Encounter Diagnosis     ICD-10-CM    1. Tendonitis, Achilles, right  M76.61           Start Time: 1415  Stop Time: 1500  Total time in clinic (min): 45 minutes    Subjective: Patient states that she has been gradually improving. She had to run after a kiddo at camp. Objective: See treatment diary below. Assessment: Tolerated treatment well. Trialed running and jumping since patient states that she had to carlos after a kiddo at camp this week with adequate running gait as well as ability to jump from SL to SL despite not being able to complete a SL heel raise. Continued emphasis on eccentric calf strengthening as well as reviewed two point discrimination training. Patient would benefit from continued PT to address functional limitations and strength in order to return to PLOF. Plan: Continue per plan of care. Insurance:  AMA/CMS Eval/ Arnold Stain #/ Referral # Total units  Start date  Expiration date Extension  Visit limitation? PT only or  PT+OT? Co-Insurance   CMS ([facility]) 3/27/2023 No auth needed     30 PCY PT  $15 copay    23             POC Start Date POC Expiration Date Signed POC?   3/27/2023 12 weeks - 2023  Y   2023 6 weeks - 2023  Y   2023 8 weeks - 2023        Date           Units:  Used           Authed:  Remaining              Precautions:   Past Medical History:   Diagnosis Date   • Anxiety    • Arthritis    • Degenerative disc disease, cervical     2 cervical and 2 lumbar, goes for spinal decompression therapy 2x monthly   • Depression    • History of herniated intervertebral disc 10/19/2005   • Hypertension        Patient provided verbal consent to treatment plan and recommended interventions.     Date 6/12/23 6/15/23 7/6/23 7/18/23 8/1/23   Visit Number  FOTO Tracking        Manual                TherEx        Active POP RB x 8 min RB 8'  RB x 8 min  RB x 8 min   Leg press 2x15 115# SS with UL calf raises 3*10 115# SS with UL calf raises 3x15 115# SS with UL calf raises 3x15 115# SS with UL calf raises 3x15 115# SS with UL calf raises   Weight shifting to R LE  BL calf raise with WS towards Rt and hold 10*15'' hold BL calf raise with WS towards Rt and hold 10*15'' hold BL calf raise with WS towards Rt and hold 10*15'' hold     Runner's stretch  HEP      Reverse lunge TRX 2x10 each 2*10 ea.    2x10 each                           Neuro Re-Ed        Eccentric SL heel raises Eccentric lower with WS 3*10, 6" lower Eccentric lower with WS 3*10, 6" lower Eccentric lower with WS 3*10, 6" lower  Eccentric lower with WS 3*10, 6" lower   SL calf raise on leg press 2x15 55# SS with BL leg press 3*10, 65# SS with BL leg press 3x15, 75# SS with BL leg press 3x15, 75# SS with BL leg press 3x15, 75# SS with BL leg press   BFR protocol (x30, x15, x15, x15)        Sled pushes        iso SL heel raises        Laterality     3x foot    Two-point discrimination    Testing x 10 min            TherAct        Patient education    Reassess x 15 min Jogging/jumping x 5 min                   Gait Training                Modalities        Ice          BFR Protocol:    5/19/23 5/22/23 5/26/23 5/30/23     Exercise 1 LAQ LAQ LAQ LAQ     Exercise 2 Standing B/L calf raises Standing B/L calf raises Standing B/L calf raises Standing B/L calf raises     Exercise 3 Seated B/L calf raises  Seated B/L calf raises Seated B/L calf raises Seated B/L calf raises     Exercise 4  Calf raises on leg press (25#) Calf raises on leg press (25#) Calf raises on leg press (25#)     Exercise 5  Squats Leg press (95# Leg press (95#     Exercise 6   B/L eccentric calf raises off step  B/L eccentric calf raises off step

## 2023-08-08 ENCOUNTER — TELEPHONE (OUTPATIENT)
Dept: FAMILY MEDICINE CLINIC | Facility: CLINIC | Age: 58
End: 2023-08-08

## 2023-08-08 DIAGNOSIS — Z12.31 ENCOUNTER FOR SCREENING MAMMOGRAM FOR BREAST CANCER: Primary | ICD-10-CM

## 2023-08-11 ENCOUNTER — HOSPITAL ENCOUNTER (OUTPATIENT)
Dept: RADIOLOGY | Facility: HOSPITAL | Age: 58
Discharge: HOME | End: 2023-08-11
Payer: COMMERCIAL

## 2023-08-11 VITALS — BODY MASS INDEX: 34.72 KG/M2 | HEIGHT: 66 IN | WEIGHT: 216 LBS

## 2023-08-11 DIAGNOSIS — F41.1 GENERALIZED ANXIETY DISORDER: ICD-10-CM

## 2023-08-11 DIAGNOSIS — Z12.31 ENCOUNTER FOR SCREENING MAMMOGRAM FOR BREAST CANCER: ICD-10-CM

## 2023-08-11 PROCEDURE — 77063 BREAST TOMOSYNTHESIS BI: CPT

## 2023-08-11 PROCEDURE — 77067 SCR MAMMO BI INCL CAD: CPT

## 2023-08-11 RX ORDER — SERTRALINE HYDROCHLORIDE 100 MG/1
TABLET, FILM COATED ORAL
Qty: 90 TABLET | Refills: 0 | Status: SHIPPED | OUTPATIENT
Start: 2023-08-11

## 2023-08-15 ENCOUNTER — OFFICE VISIT (OUTPATIENT)
Dept: PHYSICAL THERAPY | Facility: CLINIC | Age: 58
End: 2023-08-15
Payer: COMMERCIAL

## 2023-08-15 DIAGNOSIS — M76.61 TENDONITIS, ACHILLES, RIGHT: Primary | ICD-10-CM

## 2023-08-15 PROCEDURE — 97112 NEUROMUSCULAR REEDUCATION: CPT

## 2023-08-15 NOTE — PROGRESS NOTES
Daily Note     Today's date: 8/15/2023  Patient name: Ahmet Rojas  : 1965  MRN: [id number]  Referring provider: Eva Melchor MD  Dx:   Encounter Diagnosis     ICD-10-CM    1. Tendonitis, Achilles, right  M76.61           Start Time: 1015  Stop Time: 1100  Total time in clinic (min): 45 minutes    Subjective: Patient states that she has been gradually improving. She follows-up with surgeon so POC will be discussed further at that point. She is only concerned with her strength progress at this point. Objective: See treatment diary below. Short Term Goals (2 weeks):  MET  1. Patient will be independent with basic HEP. 2. Patient will report >50% reduction in pain. 3. Patient will demonstrate >1/3 improvement in MMT grade as applicable  4. Patient will increase R ankle ROM to normalize gait for functional mobility  5. Patient will demonstrate improved sensation on superior aspect of distal foot for increased comfort during activity and rest    Long Term Goals (12 weeks):   1. Patient will be independent in a comprehensive home exercise program MET  2. Patient FOTO score will improve to 52/100 MET  3. Patient will self-report >75% improvement in function MET  4. Patient will walk with minimal deviations to allow access to community and prepare for return to work/leisure PROGRESSING  5. Patient will demonstrate improved ability to WB and decrease reliance on RW to promote improve functional mobility MET    New Goals:  1. Patient will be able to perform a SL heel raise  2.  Patient will be able to successful return to gym based fitness program    Laterality trainin% accuracy/1.28 sec round 1, 92% accuracy/1.16 sec round 2   2 point discrimination significantly limited w/ differentiation only present at 40 mm over dorsum of foot and anterior shin; no discrimination or pin prick sensation over GT    Alter-G: able to perform SL calf raises @ 50% WBing (20 reps), 55% WBing (20 reps), 60% WBing (20 reps, started losing height and slight pain at end range)      Assessment: Barron Moritz has attended 27 visits over 20 weeks. They have made notable improvements in functional mobility and strength. Despite improvements, patient continues to have limitations in ability to perform a SL heel raise as noted above. Patient has made progress towards short term goals and long term goals. They would benefit from continued skilled physical therapy 1x every 2 weeks for 8 weeks to address these impairments and functional limitations in order to return to [facility]. Plan: Continue per plan of care. POC start date: 8/15/23  POC end date: 10/10/23     Insurance:  AMA/CMS Eval/ Re-eval Auth #/ Referral # Total units  Start date  Expiration date Extension  Visit limitation? PT only or  PT+OT? Co-Insurance   CMS ([facility]) 3/27/2023 No auth needed     30 PCY PT  $15 copay    6/2/23            7/18/23            8/15/23             POC Start Date POC Expiration Date Signed POC?   3/27/2023 12 weeks - 6/19/2023  Y   6/2/2023 6 weeks - 7/14/2023  Y   7/18/2023 8 weeks - 8/19/2023  Y   8/15/2023 8 weeks - 10/10/2023       Date           Units:  Used           Authed:  Remaining              Precautions:   Past Medical History:   Diagnosis Date   • Anxiety    • Arthritis    • Degenerative disc disease, cervical     2 cervical and 2 lumbar, goes for spinal decompression therapy 2x monthly   • Depression    • History of herniated intervertebral disc 10/19/2005   • Hypertension        Patient provided verbal consent to treatment plan and recommended interventions.     Date 6/15/23 7/6/23 7/18/23 8/1/23 8/15/23   Visit Number 23/30 24/30 25/30 26/30 27/30   FOTO Tracking        Manual                TherEx        Active POP RB 8'  RB x 8 min  RB x 8 min RB x 8 min   Leg press 3*10 115# SS with UL calf raises 3x15 115# SS with UL calf raises 3x15 115# SS with UL calf raises 3x15 115# SS with UL calf raises    Weight shifting to R LE  BL calf raise with WS towards Rt and hold 10*15'' hold BL calf raise with WS towards Rt and hold 10*15'' hold      Runner's stretch HEP       Reverse lunge TRX 2*10 ea.    2x10 each                            Neuro Re-Ed        Eccentric SL heel raises Eccentric lower with WS 3*10, 6" lower Eccentric lower with WS 3*10, 6" lower  Eccentric lower with WS 3*10, 6" lower    SL calf raise on leg press 3*10, 65# SS with BL leg press 3x15, 75# SS with BL leg press 3x15, 75# SS with BL leg press 3x15, 75# SS with BL leg press    BFR protocol (x30, x15, x15, x15)        Sled pushes        iso SL heel raises     Alter-G testing x 15 min (see above)   Laterality    3x foot     Two-point discrimination   Testing x 10 min     Sl balance     B[facility] step ups x20    wobble disc x10    SL 5 x max holds           TherAct        Patient education   Reassess x 15 min Jogging/jumping x 5 min Reassess x 15 min                   Gait Training                Modalities        Ice          BFR Protocol:    5/19/23 5/22/23 5/26/23 5/30/23     Exercise 1 LAQ LAQ LAQ LAQ     Exercise 2 Standing B/L calf raises Standing B/L calf raises Standing B/L calf raises Standing B/L calf raises     Exercise 3 Seated B/L calf raises  Seated B/L calf raises Seated B/L calf raises Seated B/L calf raises     Exercise 4  Calf raises on leg press (25#) Calf raises on leg press (25#) Calf raises on leg press (25#)     Exercise 5  Squats Leg press (95# Leg press (95#     Exercise 6   B/L eccentric calf raises off step  B/L eccentric calf raises off step

## 2023-08-16 ENCOUNTER — OFFICE VISIT (OUTPATIENT)
Dept: OBGYN CLINIC | Facility: CLINIC | Age: 58
End: 2023-08-16
Payer: COMMERCIAL

## 2023-08-16 VITALS
DIASTOLIC BLOOD PRESSURE: 86 MMHG | WEIGHT: 216 LBS | BODY MASS INDEX: 34.72 KG/M2 | HEART RATE: 94 BPM | HEIGHT: 66 IN | SYSTOLIC BLOOD PRESSURE: 120 MMHG

## 2023-08-16 DIAGNOSIS — M76.61 TENDONITIS, ACHILLES, RIGHT: ICD-10-CM

## 2023-08-16 DIAGNOSIS — M77.51 RETROCALCANEAL BURSITIS (BACK OF HEEL), RIGHT: ICD-10-CM

## 2023-08-16 DIAGNOSIS — M92.61 HAGLUND'S DEFORMITY OF RIGHT HEEL: Primary | ICD-10-CM

## 2023-08-16 PROCEDURE — 99213 OFFICE O/P EST LOW 20 MIN: CPT | Performed by: ORTHOPAEDIC SURGERY

## 2023-08-16 NOTE — PROGRESS NOTES
Eva Melchor M.D. Attending, Orthopaedic Surgery  [facility]      ORTHOPAEDIC [facility]OT [facility] VISIT     Assessment:     Encounter Diagnoses   Name Primary? • Adrienne's deformity of right heel Yes   • Tendonitis, Achilles, right    • Retrocalcaneal bursitis (back of heel), right          DOS: 3/2/23     Plan:   · The patient verbalized understanding of exam findings and treatment plan. We engaged in the shared decision-making process and treatment options were discussed at length with the patient. Surgical and conservative management discussed today along with risks and benefits. · Patient is 6 months s/p right Adrienne's resection with retrocalcaneal bursectomy and achilles debridement and repair  · Continue PT/HEP as directed  · Compression stocking for swelling control prn  · Ice and elevation for pain control prn  · She has no restrictions and is stable from an orthopedic standpoint  Return if symptoms worsen or fail to improve. History of Present Illness:   Chief Complaint:   Chief Complaint   Patient presents with   • Right Ankle - Follow-up     Ahmet Rojas is a 62 y.o. female who is being seen in follow-up for above procedure. When we last saw she we recommended WBAT in sneaker. Pain has improved. Residual pain is localized at incision site with minimal radiating and described as sharp and severe.       Pain/symptom timing:  Worse during the day when active  Pain/symptom context:  Worse with activites and work  Pain/symptom modifying factors:  Rest makes better, activities make worse  Pain/symptom associated signs/symptoms: none    Prior treatment   · NSAIDsYes   · Injections No   · Bracing/Orthotics Yes    · Physical Therapy Yes     Orthopedic Surgical History:   See below    Past Medical, Surgical and Social History:  Past Medical History:  has a past medical history of Anxiety, Arthritis, Degenerative disc disease, cervical, Depression, History of herniated intervertebral disc (10/19/2005), and Hypertension. Problem List: does not have any pertinent problems on file. Past Surgical History:  has a past surgical history that includes Appendectomy;  section; Foot surgery (Left, ); Knee surgery (Right); Tubal ligation; Hysterectomy; Vulvectomy; and pr ostectomy calcaneus spur w/wo plntar fascial rls (Right, 3/2/2023). Family History: family history includes Anxiety disorder in her mother; Arthritis in her father and mother; Atrial fibrillation in her father; Breast cancer (age of onset: 79) in her maternal grandmother; Diabetes in her mother; Heart disease in her mother; No Known Problems in her brother and maternal aunt; Obesity in her sister. Social History:  reports that she has never smoked. She has never used smokeless tobacco. She reports current alcohol use of about 2.0 standard drinks of alcohol per week. She reports that she does not use drugs. Current Medications: has a current medication list which includes the following prescription(s): cholecalciferol, clobetasol, clonazepam, diclofenac sodium, docusate sodium, misc natural products, fish oil, ondansetron, polyethylene glycol, restasis, sertraline, and verapamil. Allergies: is allergic to bee venom, meperidine, and penicillins.      Review of Systems:  General- denies fever/chills  HEENT- denies hearing loss or sore throat  Eyes- denies eye pain or visual disturbances, denies red eyes  Respiratory- denies cough or SOB  Cardio- denies chest pain or palpitations  GI- denies abdominal pain  Endocrine- denies urinary frequency  Urinary- denies pain with urination  Musculoskeletal- Negative except noted above  Skin- denies rashes or wounds  Neurological- denies dizziness or headache  Psychiatric- denies anxiety or difficulty concentrating    Physical Exam:   /86   Pulse 94   Ht 5' 6" (1.676 m)   Wt 98 kg (216 lb)   BMI 34.86 kg/m²   General/Constitutional: No apparent distress: well-nourished and well developed. Eyes: normal ocular motion  Lymphatic: No appreciable lymphadenopathy  Respiratory: Non-labored breathing  Vascular: No edema, swelling or tenderness, except as noted in detailed exam.  Integumentary: No impressive skin lesions present, except as noted in detailed exam.  Neuro: No ataxia or tremors noted  Psych: Normal mood and affect, oriented to person, place and time. Appropriate affect. Musculoskeletal: Normal, except as noted in detailed exam and in HPI. Examination    Left      Gait Normal   Musculoskeletal NTTP    Skin Normal.  Well-healed incisions. Nails Normal    Range of Motion  15 degrees dorsiflexion, 30 degrees plantarflexion  Subtalar motion: normal    Stability Stable    Muscle Strength 5/5 tibialis anterior  5/5 gastrocnemius-soleus  5/5 posterior tibialis  5/5 peroneal/eversion strength  5/5 EHL  5/5 FHL    Neurologic Normal    Sensation Intact to light touch throughout sural, saphenous, superficial peroneal, deep peroneal and medial/lateral plantar nerve distributions. [place]-Bety 5.07 filament (10g) testing  deferred. Cardiovascular Brisk capillary refill < 2 seconds,intact DP and PT pulses    Special Tests None      Imaging Studies:   No new imaging      Scribe Attestation    I,:  Toby Cisneros PA-C am acting as a scribe while in the presence of the attending physician.:       I,:  Wagner Thomson MD personally performed the services described in this documentation    as scribed in my presence.:               Molinda Rue. Lachman, MD  Foot & Ankle Surgery   Department of [address] [place] [address]      I personally performed the service. Molinda Rue. Lachman, MD

## 2023-08-28 ENCOUNTER — HOSPITAL ENCOUNTER (OUTPATIENT)
Dept: RADIOLOGY | Facility: HOSPITAL | Age: 58
Discharge: HOME | End: 2023-08-28
Payer: COMMERCIAL

## 2023-08-28 VITALS — BODY MASS INDEX: 34.55 KG/M2 | HEIGHT: 66 IN | WEIGHT: 215 LBS

## 2023-08-28 DIAGNOSIS — Z78.0 POSTMENOPAUSAL: ICD-10-CM

## 2023-08-28 DIAGNOSIS — S92.034A CLOSED NONDISPLACED AVULSION FRACTURE OF TUBEROSITY OF RIGHT CALCANEUS, INITIAL ENCOUNTER: ICD-10-CM

## 2023-08-28 PROCEDURE — 77080 DXA BONE DENSITY AXIAL: CPT

## 2023-08-29 ENCOUNTER — APPOINTMENT (OUTPATIENT)
Dept: PHYSICAL THERAPY | Facility: CLINIC | Age: 58
End: 2023-08-29
Payer: COMMERCIAL

## 2023-10-17 ENCOUNTER — OFFICE VISIT (OUTPATIENT)
Dept: FAMILY MEDICINE CLINIC | Facility: CLINIC | Age: 58
End: 2023-10-17
Payer: COMMERCIAL

## 2023-10-17 VITALS
HEART RATE: 72 BPM | HEIGHT: 66 IN | DIASTOLIC BLOOD PRESSURE: 70 MMHG | WEIGHT: 219 LBS | BODY MASS INDEX: 35.2 KG/M2 | SYSTOLIC BLOOD PRESSURE: 124 MMHG | RESPIRATION RATE: 16 BRPM | TEMPERATURE: 98.6 F | OXYGEN SATURATION: 98 %

## 2023-10-17 DIAGNOSIS — I10 BENIGN ESSENTIAL HYPERTENSION: ICD-10-CM

## 2023-10-17 DIAGNOSIS — F41.1 GENERALIZED ANXIETY DISORDER: ICD-10-CM

## 2023-10-17 DIAGNOSIS — J02.0 STREP PHARYNGITIS: Primary | ICD-10-CM

## 2023-10-17 LAB — S PYO AG THROAT QL: POSITIVE

## 2023-10-17 PROCEDURE — 99214 OFFICE O/P EST MOD 30 MIN: CPT | Performed by: NURSE PRACTITIONER

## 2023-10-17 PROCEDURE — 87880 STREP A ASSAY W/OPTIC: CPT | Performed by: NURSE PRACTITIONER

## 2023-10-17 RX ORDER — AZITHROMYCIN 250 MG/1
TABLET, FILM COATED ORAL
Qty: 6 TABLET | Refills: 0 | Status: SHIPPED | OUTPATIENT
Start: 2023-10-17 | End: 2023-10-22

## 2023-10-17 NOTE — LETTER
October 17, 2023     Patient: Shay Dotson  YOB: 1965  Date of Visit: 10/17/2023      To Whom it May Concern:    Manisha Marilyn is under my professional care. Scott Kennedy was seen in my office on 10/17/2023. Scott Kennedy may return to work on 10/19/2023 . If you have any questions or concerns, please don't hesitate to call.          Sincerely,          ASA Thomson        CC: No Recipients

## 2023-10-17 NOTE — PATIENT INSTRUCTIONS
Strep Throat   AMBULATORY CARE:   Strep throat  is a throat infection caused by bacteria. It is easily spread from person to person. Common symptoms include the following:   Sore, red, and swollen throat    Fever and headache     Upset stomach, abdominal pain, or vomiting    White or yellow patches or blisters in the back of your throat    Tender, swollen lumps on the sides of your neck or jaw    Throat pain when you swallow    Call 911 for any of the following: You have trouble breathing. Seek care immediately if:   You have new symptoms like a bad headache, stiff neck, chest pain, or vomiting. You are drooling because you cannot swallow your spit. Contact your healthcare provider if:   You have a fever. You have a rash or ear pain. You have green, yellow-brown, or bloody mucus when you cough or blow your nose. You are unable to drink anything. You have questions or concerns about your condition or care. Treatment for strep throat  may include antibiotic medicine to treat your strep throat. You should feel better within 2 to 3 days after you start antibiotics. You may return to work or school 24 hours after you start antibiotics. Manage strep throat:   Use lozenges, ice, soft foods, or popsicles  to soothe your throat. Drink juice, milk shakes, or soup  if your throat is too sore to eat solid food. Drinking liquids can also help prevent dehydration. Gargle with salt water. Mix ¼ teaspoon salt in a glass of warm water and gargle. This may help reduce swelling in your throat. Do not smoke. Nicotine and other chemicals in cigarettes and cigars can cause lung damage and make your symptoms worse. Ask your healthcare provider for information if you currently smoke and need help to quit. E-cigarettes or smokeless tobacco still contain nicotine. Talk to your healthcare provider before you use these products. Prevent the spread of strep throat:   Wash your hands often.   Use soap and water. Wash your hands after you use the bathroom, change a child's diapers, or sneeze. Wash your hands before you prepare or eat food. Do not share food or drinks. Replace your toothbrush after you have taken antibiotics for 24 hours. Follow up with your doctor as directed:  Write down your questions so you remember to ask them during your visits. © Copyright Reyna Veliz 2023 Information is for End User's use only and may not be sold, redistributed or otherwise used for commercial purposes. The above information is an  only. It is not intended as medical advice for individual conditions or treatments. Talk to your doctor, nurse or pharmacist before following any medical regimen to see if it is safe and effective for you. Azithromycin (By mouth)   Azithromycin (em-ylln-xco-MYE-sin)  Treats infections. This medicine is a macrolide antibiotic. Brand Name(s): Zithromax, Zithromax Tri-Agusto, Zithromax Z-Agusto, Zmax   There may be other brand names for this medicine. When This Medicine Should Not Be Used: This medicine is not right for everyone. Do not use it if you had an allergic reaction to azithromycin, erythromycin, or similar medicines, or if you have a history of liver problems caused by azithromycin. How to Use This Medicine:   Capsule, Liquid, Packet, Powder, Tablet  Your doctor will tell you how much medicine to use. Do not use more than directed. Take all of the medicine in your prescription to clear up your infection, even if you feel better after the first few doses. Multiple dose (Zithromax® oral liquid or tablets): You may take this medicine with or without food. Shake the bottle well before you measure the medicine. Measure the oral liquid medicine with a marked measuring spoon, oral syringe, or medicine cup.   Single dose (Zmax® extended-release oral liquid or powder):   Liquid:   Take this medicine on an empty stomach at least 1 hour before you eat, or 2 hours after you eat.  Call your doctor right away if you vomit within 1 hour after you take the medicine. You must take the liquid within 12 hours after the pharmacist gives it to you. Shake the bottle well before you measure the medicine. Measure your dose with a marked measuring spoon, cup, or syringe. Powder:   Open 1 packet and pour all of the medicine into a glass with about 2 ounces (¼ cup) of water. Mix well and drink the medicine right away. Pour another 2 ounces of water into the same glass, and drink the remaining medicine. Read and follow the patient instructions that come with this medicine. Talk to your doctor or pharmacist if you have any questions. Missed dose: If you are taking multiple doses, take the dose as soon as you remember. If it is almost time for your next dose, wait until then to take a regular dose. Do not use extra medicine to make up for a missed dose. Store the medicine in a closed container at room temperature, away from heat, moisture, and direct light. Extended-release oral liquid: Do not refrigerate or freeze. Oral liquid for 1 dose only: Store at room temperature. Do not store in the refrigerator or allow the medicine to freeze. Oral liquid for multiple doses: Store at room temperature or in the refrigerator. Use it within 10 days of filling the prescription. Drugs and Foods to Avoid:   Ask your doctor or pharmacist before using any other medicine, including over-the-counter medicines, vitamins, and herbal products. Some medicines can affect how azithromycin works. Tell your doctor if you are also using any of the following:  Colchicine, cyclosporine, digoxin, nelfinavir, phenytoin  Blood thinner (including warfarin)  Ergot medicine  Medicine for a heart rhythm problem (including amiodarone, dofetilide, procainamide, quinidine, sotalol)  Zithromax® for multiple doses: Do not take an antacid that contains magnesium or aluminum at the same time you take Zithromax®.  An antacid will affect how the medicine works. Antacids will not affect Zmax® for single dose. Warnings While Using This Medicine:   Tell your doctor if you are pregnant or breastfeeding, or if you have kidney disease, liver disease, heart disease, heart rhythm problems, heart failure, or myasthenia gravis. Tell your doctor if anyone in your family has heart rhythm problems. This medicine may cause the following problems:   Serious skin reactions, including Leon-Tim syndrome, acute generalized exanthematous pustulosis, toxic epidermal necrolysis, and drug reaction with eosinophilia and systemic symptoms (DRESS)  Liver problems  Infantile hypertrophic pyloric stenosis  Heart rhythm problems, including QT prolongation  Increased risk of serious heart or blood vessel problems  This medicine can cause diarrhea. Call your doctor if the diarrhea becomes severe, does not stop, or is bloody. Do not take any medicine to stop diarrhea until you have talked to your doctor. Diarrhea can occur 2 months or more after you stop taking this medicine. It may occur 2 months or more after you stop using this medicine. Call your doctor if your symptoms do not improve or if they get worse. Your doctor will do lab tests at regular visits to check on the effects of this medicine. Keep all appointments. Keep all medicine out of the reach of children. Never share your medicine with anyone. Possible Side Effects While Using This Medicine:   Call your doctor right away if you notice any of these side effects:   Allergic reaction: Itching or hives, swelling in your face or hands, swelling or tingling in your mouth or throat, chest tightness, trouble breathing  Blistering, peeling, red skin rash  Dark urine, pale stools, nausea, vomiting, loss of appetite, stomach pain, yellow skin or eyes  Fainting, dizziness, lightheadedness  Fast, pounding, or uneven heartbeat, blurred vision, chest pain, trouble breathing, tiredness or weakness  Feeling irritable or vomits after feeding (in babies)  Severe diarrhea that may contain blood, stomach cramps, fever  If you notice these less serious side effects, talk with your doctor:   Mild diarrhea, nausea, vomiting, stomach pain  If you notice other side effects that you think are caused by this medicine, tell your doctor. Call your doctor for medical advice about side effects. You may report side effects to FDA at 9-662-FDA-7130  © Copyright Thelma Sin 2023 Information is for End User's use only and may not be sold, redistributed or otherwise used for commercial purposes. The above information is an  only. It is not intended as medical advice for individual conditions or treatments. Talk to your doctor, nurse or pharmacist before following any medical regimen to see if it is safe and effective for you.

## 2023-10-17 NOTE — PROGRESS NOTES
Assessment/Plan:    1. Strep pharyngitis  -     POCT rapid strepA  -     azithromycin (ZITHROMAX) 250 mg tablet; Take 500mg on day 1, 250mg on days 2-5    2. Benign essential hypertension  Assessment & Plan:  Stable with current regimen      3. Generalized anxiety disorder  Assessment & Plan:  Stable with zoloft              Patient Instructions: Take medication with food. It is important that you take the entire course of antibiotics prescribed. May also take a probiotic of your choice to maintain healthy GI beverley. Can take some probiotic and yogurt with the medication. Gargle with warm salt water for 5 minutes every 4 hours. Drink plenty of fluids at least 6 glasses of water a day. Can use some honey lemon tea. Call or follow up if symptoms are not better in 7 days. Return if symptoms worsen or fail to improve. No future appointments. Subjective:      Patient ID: Steve Aguirre is a 62 y.o. female. Chief Complaint   Patient presents with   • Sore Throat   • swollen glands   • Generalized Body Aches   • Fever     Symptoms started 4 days ago, LPapo Moya/LPN         Vitals:  /70   Pulse 72   Temp 98.6 °F (37 °C) (Temporal)   Resp 16   Ht 5' 6" (1.676 m)   Wt 99.3 kg (219 lb)   SpO2 98%   BMI 35.35 kg/m²     Sore Throat   Pertinent negatives include no abdominal pain, congestion, coughing, diarrhea, drooling, ear discharge, ear pain, headaches, shortness of breath, trouble swallowing or vomiting. Generalized Body Aches  Associated symptoms include a sore throat. Pertinent negatives include no congestion, ear discharge, ear pain, headaches, hearing loss, mouth sores, rhinorrhea, fatigue, fever, coughing, shortness of breath, wheezing, abdominal pain, constipation, diarrhea, nausea or vomiting. Fever  Associated symptoms include a sore throat. Pertinent negatives include no abdominal pain, chills, congestion, coughing, diaphoresis, fatigue, fever, headaches, nausea or vomiting. Patient stated that started with scratchy throat couple of days ago and progressed to fever, sore throat, chills and swollen glands in neck. Fever has resolved now. Complaint with medications for chronic illnesses. The following portions of the patient's history were reviewed and updated as appropriate: allergies, current medications, past family history, past medical history, past social history, past surgical history and problem list.      Review of Systems   Constitutional:  Negative for chills, diaphoresis, fatigue, fever and unexpected weight change. HENT:  Positive for sore throat. Negative for congestion, dental problem, drooling, ear discharge, ear pain, facial swelling, hearing loss, mouth sores, nosebleeds, postnasal drip, rhinorrhea, sinus pressure, sinus pain, sneezing, tinnitus, trouble swallowing and voice change. Respiratory:  Negative for cough, chest tightness, shortness of breath and wheezing. Cardiovascular: Negative. Gastrointestinal:  Negative for abdominal pain, constipation, diarrhea, nausea and vomiting. Musculoskeletal: Negative. Skin: Negative. Neurological:  Negative for dizziness, light-headedness and headaches. Objective:    Social History     Tobacco Use   Smoking Status Never   Smokeless Tobacco Never       Allergies: Allergies   Allergen Reactions   • Bee Venom Anaphylaxis     Reaction Date: [id number];   Reaction Date: 23Oct2006;    • Meperidine GI Intolerance     DEMEROL vomiting  DEMEROL vomiting   • Penicillins      Reaction Date: 25Jul2005;  Annotation - [id number]: as infant - throat closes, difficult breathing         Current Outpatient Medications   Medication Sig Dispense Refill   • azithromycin (ZITHROMAX) 250 mg tablet Take 500mg on day 1, 250mg on days 2-5 6 tablet 0   • Calcium-Magnesium-Vitamin D (CALCIUM 1200+D3 PO)      • cholecalciferol (VITAMIN D3) 1,000 units tablet Take 1 tablet (1,000 Units total) by mouth daily 30 tablet 0 • clobetasol (TEMOVATE) 0.05 % cream Apply topically 2 (two) times a day as needed     • clonazePAM (KlonoPIN) 0.5 mg tablet take 1 tablet by mouth three times a day if needed for anxiety 90 tablet 0   • diclofenac sodium (VOLTAREN) 1 % Place on the skin     • Misc Natural Products (ELDERBERRY ZINC/VIT C/IMMUNE MT) Apply to the mouth or throat     • Omega-3 Fatty Acids (fish oil) 1,000 mg Take 1,000 mg by mouth daily     • ondansetron (ZOFRAN) 4 mg tablet Take 1 tablet (4 mg total) by mouth every 8 (eight) hours as needed for nausea or vomiting 30 tablet 0   • RESTASIS 0.05 % ophthalmic emulsion Administer to both eyes every 12 (twelve) hours    0   • sertraline (ZOLOFT) 100 mg tablet take 1 tablet by mouth once daily 90 tablet 0   • verapamil (VERELAN PM) 120 MG 24 hr capsule take 1 capsule by mouth once daily 90 capsule 0   • docusate sodium (COLACE) 100 mg capsule Take 1 capsule (100 mg total) by mouth 2 (two) times a day as needed for constipation (Patient not taking: Reported on 10/17/2023) 60 capsule 0   • polyethylene glycol (MIRALAX) 17 g packet Take 17 g by mouth daily (Patient not taking: Reported on 10/17/2023) 14 each 0     No current facility-administered medications for this visit. Physical Exam  Vitals reviewed. Constitutional:       Appearance: Normal appearance. She is well-developed. HENT:      Head: Normocephalic. Right Ear: External ear normal.      Left Ear: External ear normal.      Nose: Nose normal.      Right Sinus: No maxillary sinus tenderness or frontal sinus tenderness. Left Sinus: No maxillary sinus tenderness or frontal sinus tenderness. Mouth/Throat:      Mouth: No oral lesions. Pharynx: Pharyngeal swelling present. No oropharyngeal exudate or posterior oropharyngeal erythema. Cardiovascular:      Rate and Rhythm: Normal rate and regular rhythm. Heart sounds: Normal heart sounds.    Pulmonary:      Effort: Pulmonary effort is normal. Breath sounds: Normal breath sounds. Musculoskeletal:         General: Normal range of motion. Cervical back: Neck supple. Lymphadenopathy:      Cervical: Cervical adenopathy present. Right cervical: Superficial cervical adenopathy present. No posterior cervical adenopathy. Left cervical: Superficial cervical adenopathy present. No posterior cervical adenopathy. Skin:     General: Skin is warm and dry. Neurological:      Mental Status: She is alert and oriented to person, place, and time. Psychiatric:         Behavior: Behavior normal.         Thought Content:  Thought content normal.         Judgment: Judgment normal.           Recent Results (from the past 24 hour(s))   POCT rapid strepA    Collection Time: 10/17/23  4:16 PM   Result Value Ref Range     RAPID STREP A Positive (A) Negative               ASA Carl

## 2023-10-29 DIAGNOSIS — I10 BENIGN ESSENTIAL HYPERTENSION: ICD-10-CM

## 2023-10-30 RX ORDER — VERAPAMIL HYDROCHLORIDE 120 MG/1
CAPSULE, EXTENDED RELEASE ORAL
Qty: 90 CAPSULE | Refills: 1 | Status: SHIPPED | OUTPATIENT
Start: 2023-10-30

## 2023-11-30 DIAGNOSIS — F41.1 GENERALIZED ANXIETY DISORDER: ICD-10-CM

## 2023-11-30 RX ORDER — SERTRALINE HYDROCHLORIDE 100 MG/1
TABLET, FILM COATED ORAL
Qty: 90 TABLET | Refills: 1 | Status: SHIPPED | OUTPATIENT
Start: 2023-11-30

## 2024-05-13 ENCOUNTER — TELEPHONE (OUTPATIENT)
Dept: GASTROENTEROLOGY | Facility: CLINIC | Age: 59
End: 2024-05-13

## 2024-05-13 NOTE — TELEPHONE ENCOUNTER
Pt is due for a 5 yr colon recall due to hx of hyperplastic polyps with Dr Huerta. Called pt to try to schedule, no answer, no machine. Will call again to try to schedule.

## 2024-07-24 DIAGNOSIS — F41.1 GENERALIZED ANXIETY DISORDER: ICD-10-CM

## 2024-07-24 RX ORDER — SERTRALINE HYDROCHLORIDE 100 MG/1
TABLET, FILM COATED ORAL
Qty: 100 TABLET | Refills: 0 | Status: SHIPPED | OUTPATIENT
Start: 2024-07-24

## 2024-08-23 ENCOUNTER — TELEPHONE (OUTPATIENT)
Age: 59
End: 2024-08-23

## 2024-08-23 DIAGNOSIS — R73.01 IMPAIRED FASTING GLUCOSE: Primary | ICD-10-CM

## 2024-08-23 DIAGNOSIS — I10 BENIGN ESSENTIAL HYPERTENSION: ICD-10-CM

## 2024-08-23 NOTE — TELEPHONE ENCOUNTER
Pt called . She scheduled her annual physical for the end of October .   She requested if she can have routine lab work ordered to have done before her appt.    Pt asked if this order can be mailed to her .

## 2024-08-25 NOTE — TELEPHONE ENCOUNTER
New orders in chart  Please mail to patient as requested, also I sent them to [facility]      Thank you,  Marietta Voss, DO

## 2024-09-20 LAB
ALBUMIN SERPL-MCNC: 4.2 G/DL (ref 3.8–4.9)
ALP SERPL-CCNC: 102 IU/L (ref 44–121)
ALT SERPL-CCNC: 17 IU/L (ref 0–32)
AST SERPL-CCNC: 17 IU/L (ref 0–40)
BILIRUB SERPL-MCNC: 0.3 MG/DL (ref 0–1.2)
BUN SERPL-MCNC: 14 MG/DL (ref 6–24)
BUN/CREAT SERPL: 15 (ref 9–23)
CALCIUM SERPL-MCNC: 9 MG/DL (ref 8.7–10.2)
CHLORIDE SERPL-SCNC: 104 MMOL/L (ref 96–106)
CHOLEST SERPL-MCNC: 187 MG/DL (ref 100–199)
CO2 SERPL-SCNC: 21 MMOL/L (ref 20–29)
CREAT SERPL-MCNC: 0.93 MG/DL (ref 0.57–1)
EGFR: 71 ML/MIN/1.73
ERYTHROCYTE [DISTWIDTH] IN BLOOD BY AUTOMATED COUNT: 13.6 % (ref 11.7–15.4)
EST. AVERAGE GLUCOSE BLD GHB EST-MCNC: 117 MG/DL
GLOBULIN SER-MCNC: 2.7 G/DL (ref 1.5–4.5)
GLUCOSE SERPL-MCNC: 99 MG/DL (ref 70–99)
HBA1C MFR BLD: 5.7 % (ref 4.8–5.6)
HCT VFR BLD AUTO: 44.1 % (ref 34–46.6)
HDLC SERPL-MCNC: 51 MG/DL
HGB BLD-MCNC: 14.5 G/DL (ref 11.1–15.9)
LDLC SERPL CALC-MCNC: 108 MG/DL (ref 0–99)
LDLC/HDLC SERPL: 2.1 RATIO (ref 0–3.2)
MCH RBC QN AUTO: 28.2 PG (ref 26.6–33)
MCHC RBC AUTO-ENTMCNC: 32.9 G/DL (ref 31.5–35.7)
MCV RBC AUTO: 86 FL (ref 79–97)
PLATELET # BLD AUTO: 324 X10E3/UL (ref 150–450)
POTASSIUM SERPL-SCNC: 4.2 MMOL/L (ref 3.5–5.2)
PROT SERPL-MCNC: 6.9 G/DL (ref 6–8.5)
RBC # BLD AUTO: 5.14 X10E6/UL (ref 3.77–5.28)
SL AMB VLDL CHOLESTEROL CALC: 28 MG/DL (ref 5–40)
SODIUM SERPL-SCNC: 138 MMOL/L (ref 134–144)
TRIGL SERPL-MCNC: 162 MG/DL (ref 0–149)
WBC # BLD AUTO: 7.8 X10E3/UL (ref 3.4–10.8)

## 2024-10-03 ENCOUNTER — TELEPHONE (OUTPATIENT)
Age: 59
End: 2024-10-03

## 2024-10-03 DIAGNOSIS — Z12.31 ENCOUNTER FOR SCREENING MAMMOGRAM FOR MALIGNANT NEOPLASM OF BREAST: Primary | ICD-10-CM

## 2024-10-03 DIAGNOSIS — I10 BENIGN ESSENTIAL HYPERTENSION: ICD-10-CM

## 2024-10-03 RX ORDER — VERAPAMIL HYDROCHLORIDE 120 MG/1
CAPSULE, EXTENDED RELEASE ORAL
Qty: 30 CAPSULE | Refills: 0 | Status: SHIPPED | OUTPATIENT
Start: 2024-10-03

## 2024-10-03 NOTE — TELEPHONE ENCOUNTER
Patient request PCP place order for routine mammogram Patient will schedule within the network Please call when ordered

## 2024-10-28 ENCOUNTER — OFFICE VISIT (OUTPATIENT)
Dept: FAMILY MEDICINE CLINIC | Facility: CLINIC | Age: 59
End: 2024-10-28
Payer: COMMERCIAL

## 2024-10-28 VITALS
TEMPERATURE: 99.4 F | SYSTOLIC BLOOD PRESSURE: 118 MMHG | DIASTOLIC BLOOD PRESSURE: 84 MMHG | HEIGHT: 65 IN | RESPIRATION RATE: 16 BRPM | HEART RATE: 80 BPM | BODY MASS INDEX: 38.32 KG/M2 | WEIGHT: 230 LBS

## 2024-10-28 DIAGNOSIS — Z12.11 SCREENING FOR COLON CANCER: ICD-10-CM

## 2024-10-28 DIAGNOSIS — Z00.00 ANNUAL PHYSICAL EXAM: Primary | ICD-10-CM

## 2024-10-28 DIAGNOSIS — R73.01 IMPAIRED FASTING GLUCOSE: ICD-10-CM

## 2024-10-28 DIAGNOSIS — H04.123 DRY EYE SYNDROME OF BOTH EYES: ICD-10-CM

## 2024-10-28 DIAGNOSIS — R53.83 FATIGUE, UNSPECIFIED TYPE: ICD-10-CM

## 2024-10-28 DIAGNOSIS — F41.1 GENERALIZED ANXIETY DISORDER: ICD-10-CM

## 2024-10-28 DIAGNOSIS — I10 BENIGN ESSENTIAL HYPERTENSION: ICD-10-CM

## 2024-10-28 DIAGNOSIS — R25.3 MUSCLE TWITCHING: ICD-10-CM

## 2024-10-28 PROBLEM — N18.31 STAGE 3A CHRONIC KIDNEY DISEASE (HCC): Status: RESOLVED | Noted: 2020-09-18 | Resolved: 2024-10-28

## 2024-10-28 PROCEDURE — 99396 PREV VISIT EST AGE 40-64: CPT | Performed by: FAMILY MEDICINE

## 2024-10-28 RX ORDER — VARENICLINE 0.03 MG/.05ML
SPRAY NASAL
COMMUNITY
Start: 2024-09-20

## 2024-10-28 NOTE — PATIENT INSTRUCTIONS
"Patient Education     Routine physical for adults   The Basics   Written by the doctors and editors at [facility]   What is a physical? -- A physical is a routine visit, or \"check-up,\" with your doctor. You might also hear it called a \"wellness visit\" or \"preventive visit.\"  During each visit, the doctor will:   Ask about your physical and mental health   Ask about your habits, behaviors, and lifestyle   Do an exam   Give you vaccines if needed   Talk to you about any medicines you take   Give advice about your health   Answer your questions  Getting regular check-ups is an important part of taking care of your health. It can help your doctor find and treat any problems you have. But it's also important for preventing health problems.  A routine physical is different from a \"sick visit.\" A sick visit is when you see a doctor because of a health concern or problem. Since physicals are scheduled ahead of time, you can think about what you want to ask the doctor.  How often should I get a physical? -- It depends on your age and health. In general, for people age 21 years and older:   If you are younger than 50 years, you might be able to get a physical every 3 years.   If you are 50 years or older, your doctor might recommend a physical every year.  If you have an ongoing health condition, like diabetes or high blood pressure, your doctor will probably want to see you more often.  What happens during a physical? -- In general, each visit will include:   Physical exam - The doctor or nurse will check your height, weight, heart rate, and blood pressure. They will also look at your eyes and ears. They will ask about how you are feeling and whether you have any symptoms that bother you.   Medicines - It's a good idea to bring a list of all the medicines you take to each doctor visit. Your doctor will talk to you about your medicines and answer any questions. Tell them if you are having any side effects that bother you. You " "should also tell them if you are having trouble paying for any of your medicines.   Habits and behaviors - This includes:   Your diet   Your exercise habits   Whether you smoke, drink alcohol, or use drugs   Whether you are sexually active   Whether you feel safe at home  Your doctor will talk to you about things you can do to improve your health and lower your risk of health problems. They will also offer help and support. For example, if you want to quit smoking, they can give you advice and might prescribe medicines. If you want to improve your diet or get more physical activity, they can help you with this, too.   Lab tests, if needed - The tests you get will depend on your age and situation. For example, your doctor might want to check your:   Cholesterol   Blood sugar   Iron level   Vaccines - The recommended vaccines will depend on your age, health, and what vaccines you already had. Vaccines are very important because they can prevent certain serious or deadly infections.   Discussion of screening - \"Screening\" means checking for diseases or other health problems before they cause symptoms. Your doctor can recommend screening based on your age, risk, and preferences. This might include tests to check for:   Cancer, such as breast, prostate, cervical, ovarian, colorectal, prostate, lung, or skin cancer   Sexually transmitted infections, such as chlamydia and gonorrhea   Mental health conditions like depression and anxiety  Your doctor will talk to you about the different types of screening tests. They can help you decide which screenings to have. They can also explain what the results might mean.   Answering questions - The physical is a good time to ask the doctor or nurse questions about your health. If needed, they can refer you to other doctors or specialists, too.  Adults older than 65 years often need other care, too. As you get older, your doctor will talk to you about:   How to prevent falling at " home   Hearing or vision tests   Memory testing   How to take your medicines safely   Making sure that you have the help and support you need at home  All topics are updated as new evidence becomes available and our peer review process is complete.  This topic retrieved from [employer] on: May 02, 2024.  Topic 186261 Version 1.0  Release: 32.4.3 - C32.122  © 2024 UpToDate, Inc. and/or its affiliates. All rights reserved.  Consumer Information Use and Disclaimer   Disclaimer: This generalized information is a limited summary of diagnosis, treatment, and/or medication information. It is not meant to be comprehensive and should be used as a tool to help the user understand and/or assess potential diagnostic and treatment options. It does NOT include all information about conditions, treatments, medications, side effects, or risks that may apply to a specific patient. It is not intended to be medical advice or a substitute for the medical advice, diagnosis, or treatment of a health care provider based on the health care provider's examination and assessment of a patient's specific and unique circumstances. Patients must speak with a health care provider for complete information about their health, medical questions, and treatment options, including any risks or benefits regarding use of medications. This information does not endorse any treatments or medications as safe, effective, or approved for treating a specific patient. UpToDate, Inc. and its affiliates disclaim any warranty or liability relating to this information or the use thereof.The use of this information is governed by the Terms of Use, available at https://www.woltersJalousieruwer.com/en/know/clinical-effectiveness-terms. 2024© UpToDate, Inc. and its affiliates and/or licensors. All rights reserved.  Copyright   © 2024 UpToDate, Inc. and/or its affiliates. All rights reserved.

## 2024-10-28 NOTE — PROGRESS NOTES
Adult Annual Physical  Name: Mike Luna      : 1965      MRN: [id number]  Encounter Provider: Marietta Voss DO  Encounter Date: 10/28/2024   Encounter department: [facility]    Assessment & Plan  Annual physical exam         Screening for colon cancer    Orders:    Ambulatory referral to Gastroenterology; Future    Benign essential hypertension    Orders:    TSH, 3rd generation; Future    TSH, 3rd generation    Impaired fasting glucose  Stable        Dry eye syndrome of both eyes  Worsening   Orders:    Sjogren's Antibodies; Future    Sjogren's Antibodies    Muscle twitching    Orders:    Magnesium; Future    Magnesium    Fatigue, unspecified type    Orders:    Vitamin B12; Future    Vitamin B12    Generalized anxiety disorder         Immunizations and preventive care screenings were discussed with patient today. Appropriate education was printed on patient's after visit summary.    Counseling:  Dental Health: discussed importance of regular tooth brushing, flossing, and dental visits.  Exercise: the importance of regular exercise/physical activity was discussed. Recommend exercise 3-5 times per week for at least 30 minutes.          History of Present Illness     Adult Annual Physical:  Patient presents for annual physical. She is going to be moving to [place]  She will nancy going back and forth. She has a condo in the area.   During the big change she needed the anxiety medication more.   She has not needed the clonazepam now in months.    Now she has been getting weird muscle spasms.  She has felt more fatigued.  Doing laundry takes a lot out of her. .     Diet and Physical Activity:    - Exercise: walking.    Depression Screening:    - PHQ-9 Score: 3    General Health:  - Sleep: sleeps well.  - Hearing: normal hearing bilateral ears.  - Vision: goes for regular eye exams and wears glasses.  - Dental: regular dental visits.    /GYN Health:  - Follows with GYN: yes.   - Menopause:  "postmenopausal.     Review of Systems   Constitutional:  Positive for fatigue.   Eyes:         Following with eye doctor for dry eyes, gets blurred vision sometimes    Neurological:  Negative for weakness.         Objective     /84   Pulse 80   Temp 99.4 °F (37.4 °C)   Resp 16   Ht 5' 5.25\" (1.657 m)   Wt 104 kg (230 lb)   BMI 37.98 kg/m²     Physical Exam  Vitals and nursing note reviewed.   Constitutional:       Appearance: She is well-developed.   HENT:      Head: Normocephalic and atraumatic.      Right Ear: External ear normal.      Left Ear: External ear normal.      Nose: Nose normal.   Cardiovascular:      Rate and Rhythm: Normal rate and regular rhythm.      Heart sounds: Normal heart sounds. No murmur heard.     No friction rub.   Pulmonary:      Effort: No respiratory distress.      Breath sounds: Normal breath sounds. No wheezing or rales.   Abdominal:      Palpations: Abdomen is soft.      Tenderness: There is no abdominal tenderness.   Musculoskeletal:      Right lower leg: No edema.      Left lower leg: No edema.   Neurological:      Mental Status: She is oriented to person, place, and time.      Cranial Nerves: No cranial nerve deficit.        Vision Screening    Right eye Left eye Both eyes   Without correction      With correction 20/20 20/20 20/15       "

## 2024-10-28 NOTE — ASSESSMENT & PLAN NOTE
Lab Results   Component Value Date    EGFR 71 09/19/2024    EGFR 74 04/12/2023    EGFR 71 09/22/2022    CREATININE 0.93 09/19/2024    CREATININE 0.90 04/12/2023    CREATININE 0.94 09/22/2022

## 2024-10-28 NOTE — ASSESSMENT & PLAN NOTE
Orders:    TSH, 3rd generation; Future    TSH, 3rd generation     Rhofade Pregnancy And Lactation Text: This medication has not been assigned a Pregnancy Risk Category. It is unknown if the medication is excreted in breast milk.

## 2024-10-31 LAB
ENA SS-A AB SER-ACNC: <0.2 AI (ref 0–0.9)
ENA SS-B AB SER-ACNC: <0.2 AI (ref 0–0.9)
MAGNESIUM SERPL-MCNC: 2.2 MG/DL (ref 1.6–2.3)
TSH SERPL DL<=0.005 MIU/L-ACNC: 2.35 UIU/ML (ref 0.45–4.5)
VIT B12 SERPL-MCNC: 256 PG/ML (ref 232–1245)

## 2024-11-09 DIAGNOSIS — I10 BENIGN ESSENTIAL HYPERTENSION: ICD-10-CM

## 2024-11-09 RX ORDER — VERAPAMIL HYDROCHLORIDE 120 MG/1
CAPSULE, EXTENDED RELEASE ORAL
Qty: 90 CAPSULE | Refills: 1 | Status: SHIPPED | OUTPATIENT
Start: 2024-11-09

## 2024-11-21 DIAGNOSIS — F41.1 GENERALIZED ANXIETY DISORDER: ICD-10-CM

## 2024-11-22 RX ORDER — SERTRALINE HYDROCHLORIDE 100 MG/1
100 TABLET, FILM COATED ORAL DAILY
Qty: 90 TABLET | Refills: 1 | Status: SHIPPED | OUTPATIENT
Start: 2024-11-22

## 2024-12-14 ENCOUNTER — HOSPITAL ENCOUNTER (OUTPATIENT)
Facility: HOSPITAL | Age: 59
Discharge: HOME | End: 2024-12-14

## 2024-12-14 DIAGNOSIS — Z12.31 ENCOUNTER FOR SCREENING MAMMOGRAM FOR MALIGNANT NEOPLASM OF BREAST: ICD-10-CM

## 2024-12-14 PROCEDURE — 77067 SCR MAMMO BI INCL CAD: CPT

## 2024-12-14 PROCEDURE — 77063 BREAST TOMOSYNTHESIS BI: CPT

## 2024-12-15 ENCOUNTER — RESULTS FOLLOW-UP (OUTPATIENT)
Dept: FAMILY MEDICINE CLINIC | Facility: CLINIC | Age: 59
End: 2024-12-15

## 2025-01-11 DIAGNOSIS — I10 BENIGN ESSENTIAL HYPERTENSION: ICD-10-CM

## 2025-01-13 ENCOUNTER — TELEPHONE (OUTPATIENT)
Dept: GASTROENTEROLOGY | Facility: CLINIC | Age: 60
End: 2025-01-13

## 2025-01-13 DIAGNOSIS — L40.9 PSORIASIS: Primary | ICD-10-CM

## 2025-01-13 RX ORDER — VERAPAMIL HYDROCHLORIDE 120 MG/1
120 CAPSULE, EXTENDED RELEASE ORAL DAILY
Qty: 90 CAPSULE | Refills: 1 | Status: SHIPPED | OUTPATIENT
Start: 2025-01-13

## 2025-01-13 RX ORDER — CLOBETASOL PROPIONATE 0.5 MG/G
CREAM TOPICAL
Qty: 30 G | Refills: 1 | Status: SHIPPED | OUTPATIENT
Start: 2025-01-13

## 2025-01-13 NOTE — TELEPHONE ENCOUNTER
Reason for call:   [x] Refill   [] Prior Auth  [] Other:     Office:   [x] PCP/Provider - Marietta Voss  [] Specialty/Provider -     Medication: verapamil (Verelan) 120 MG 24 hr capsule     Dose/Frequency: 1 daily    Quantity: 90    [facility]    Does the patient have enough for 3 days?   [] Yes   [x] No - Send as HP to PODReason for call:   [] Refill   [] Prior Auth  [] Other:       Reason for call:   [x] Refill   [] Prior Auth  [] Other:   Office:   [] PCP/Provider - Marietta Voss  [] Specialty/Provider -     Medication: clobetasol (TEMOVATE) 0.05 % cream     Dose/Frequency: as needed    Quantity: Not listed    [facility]    Does the patient have enough for 3 days?   [] Yes   [x] No - Send as HP to POD  
No

## 2025-01-13 NOTE — TELEPHONE ENCOUNTER
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

## 2025-01-14 RX ORDER — CLOBETASOL PROPIONATE 0.5 MG/G
CREAM TOPICAL 2 TIMES DAILY PRN
Refills: 0 | OUTPATIENT
Start: 2025-01-14

## 2025-02-04 ENCOUNTER — TELEPHONE (OUTPATIENT)
Age: 60
End: 2025-02-04

## 2025-02-04 ENCOUNTER — PREP FOR PROCEDURE (OUTPATIENT)
Age: 60
End: 2025-02-04

## 2025-02-04 DIAGNOSIS — Z83.719 FAMILY HISTORY OF COLONIC POLYPS: Primary | ICD-10-CM

## 2025-02-04 NOTE — TELEPHONE ENCOUNTER
02/04/25  Screened by: Katiuska Gonzales MA    Referring Provider recall    Pre- Screening:     There is no height or weight on file to calculate BMI.  Has patient been referred for a routine screening Colonoscopy? yes  Is the patient between 45-75 years old? yes      Previous Colonoscopy 2019   If yes:    Date: 2019    Facility: Dr. Huerta    Reason: hx of polyps      SCHEDULING STAFF: If the patient is between 45yrs-49yrs, please advise patient to confirm benefits/coverage with their insurance company for a routine screening colonoscopy, some insurance carriers will only cover at 50yrs or older. If the patient is over 75years old, please schedule an office visit.     Does the patient want to see a Gastroenterologist prior to their procedure OR are they having any GI symptoms? no    Has the patient been hospitalized or had abdominal surgery in the past 6 months? no    Does the patient use supplemental oxygen? no    Does the patient take Coumadin, Lovenox, Plavix, Elliquis, Xarelto, or other blood thinning medication? no    Has the patient had a stroke, cardiac event, or stent placed in the past year? no    SCHEDULING STAFF: If patient answers NO to above questions, then schedule procedure. If patient answers YES to above questions, then schedule office appointment.     If patient is between 45yrs - 49yrs, please advise patient that we will have to confirm benefits & coverage with their insurance company for a routine screening colonoscopy.

## 2025-02-04 NOTE — TELEPHONE ENCOUNTER
Scheduled date of colonoscopy (as of today):4-9-2025  Physician performing colonoscopy: Dr. Huerta  Location of colonoscopy: [facility]   Bowel prep reviewed with patient: dulcolax miralax sent via [employer]   Instructions reviewed with patient by: genevieve   Clearances: n/a

## 2025-02-04 NOTE — LETTER
COLONOSCOPY  MIRALAX/Dulcolax Bowel Preparation Instructions    The OR/GI Lab will contact you the evening prior to your procedure with your exact arrival time.    Our practice requires a 1 week notice for any cancellations or rescheduling. We kindly ask that you immediately notify us of any changes including any new medications that are prescribed. Thank you for your cooperation.     WEEK BEFORE YOUR PROCEDURE:  Stop taking Iron tablets.  5 days prior, AVOID vegetables and fruits with skins or seeds, nuts, corn, popcorn and whole grain breads.   Purchase: One (1) 238-gram container of Miralax (polyethylene glycol 3350), four (4) 5 mg Dulcolax (bisacodyl) tablets, and one (1) 64-ounce bottle of Gatorade (sports drink) - no red, orange, or purple. These may be purchased at any pharmacy without a prescription. Generic products are permissible.   Arrange responsible transportation for day of the procedure.     DAY BEFORE THE PROCEDURE:   CLEAR liquids only for entire day prior. Nothing red, orange or purple.    You MAY have:                                                               Soda  Water  Broth Gatorade  Jello  Popsicles Coffee/tea without milk/creamer     YOU MAY NOT HAVE:  Solid foods   Milk and milk products    Juice with pulp    BOWEL PREPARATION:  Includes: One (1) 238-gram container of Miralax (polyethylene glycol 3350), four (4) 5 mg Dulcolax (bisacodyl) tablets, and one (1) 64-ounce bottle of Gatorade (sports drink).  Preparation may be refrigerated.  Entire bowel prep should be completed.     Afternoon before the procedure (2:00 pm - 5:00 pm):    Take two (2) 5 mg Dulcolax laxative tablets.     Evening before the procedure (6:00 pm):  Mix entire container of Miralax with one (1) 64-ounce bottle of Gatorade and shake until all medication is dissolved.   Begin drinking solution. Drink an eight (8) ounce cup every 10-15 minutes until you have consumed half (32 ounces) of the solution.   Refrigerate remaining solution.    Night before the procedure (8:00 pm):  Take two (2) 5 mg Dulcolax laxative tablets.     Beginning 5 hours before your procedure:  Drink the remaining amount of prepared solution (32 ounces).  Drink an eight (8) ounce cup every 10-15 minutes until you have consumed the remaining solution.     Bowel prep should be completed 4 hours prior to procedure time.    NOTHING TO EAT OR DRINK AFTER MIDNIGHT- EXCEPT FOR YOUR PREP    DAY OF THE PROCEDURE:  You may brush your teeth.  Leave all jewelry at home.  Please arrive for your procedure as indicated by the OR / GI Lab / Endoscopy Unit. The hospital will contact you the day before with your exact arrival time.   Make sure you have arranged ahead of time for a responsible adult (18 or older) to accompany and drive you home after the procedure.  Please discuss any transportation concerns with our staff prior to your procedure.    The effects of the anesthesia can persist for 24 hours.  After receiving the sedation, you must exercise caution before engaging in any activity that could harm yourself and others (such as driving a car).  Do not make any important decisions or do not drink any alcoholic beverages during this time period.  After your procedure, you may have anything you'd like to eat or drink.  You will probably want to start with something light.  Please include plenty of fluids.  Avoid items that cause gas such as sodas and salads.    SPECIAL INSTRUCTIONS:    For patients currently taking blood thinners and/or antiplatelet therapy our office will contact the prescribing provider.  Our office will contact you with any required changes to your medication regimen.     Blood thinner (i.e. - Coumadin, Pradaxa, Lovenox, Xarelto, Eliquis)  ?  Continue (Do Not Stop)  ? Stop______________for_____________days prior to the procedure.    Antiplatelet (i.e. - Plavix, Aggrenox, Effient, Brilinta)  ?  Continue (Do Not  Stop)  ? Stop______________for_____________days prior to the procedure.       Diabetes:   If you are Diabetic, please see separate Diabetic Instruction Sheet.          Prescribed medications:  Do not stop your aspirin, or any of your other medications (unless instructed otherwise).    Take the rest of your prescribed medications with small sips of water at least 2 hours prior to your procedure.      For any questions or concerns related to your bowel preparation or pre-procedure instructions, please contact our office at [phone].  Thank you for choosing [facility]!

## 2025-02-25 ENCOUNTER — OFFICE VISIT (OUTPATIENT)
Dept: URGENT CARE | Facility: CLINIC | Age: 60
End: 2025-02-25
Payer: COMMERCIAL

## 2025-02-25 VITALS
TEMPERATURE: 103 F | OXYGEN SATURATION: 99 % | SYSTOLIC BLOOD PRESSURE: 115 MMHG | RESPIRATION RATE: 24 BRPM | HEART RATE: 115 BPM | DIASTOLIC BLOOD PRESSURE: 73 MMHG

## 2025-02-25 DIAGNOSIS — J11.1 FLU: Primary | ICD-10-CM

## 2025-02-25 PROCEDURE — 99213 OFFICE O/P EST LOW 20 MIN: CPT | Performed by: FAMILY MEDICINE

## 2025-02-25 PROCEDURE — 87636 SARSCOV2 & INF A&B AMP PRB: CPT | Performed by: FAMILY MEDICINE

## 2025-02-25 RX ORDER — OSELTAMIVIR PHOSPHATE 75 MG/1
75 CAPSULE ORAL EVERY 12 HOURS SCHEDULED
Qty: 10 CAPSULE | Refills: 0 | Status: SHIPPED | OUTPATIENT
Start: 2025-02-25 | End: 2025-03-02

## 2025-02-25 RX ORDER — ONDANSETRON 4 MG/1
4 TABLET, FILM COATED ORAL EVERY 8 HOURS PRN
Qty: 20 TABLET | Refills: 0 | Status: SHIPPED | OUTPATIENT
Start: 2025-02-25

## 2025-02-25 NOTE — PROGRESS NOTES
[facility]        NAME: Mike Luna is a 59 y.o. female  : 1965    MRN: [id number]  DATE: 2025  TIME: 6:42 PM    Assessment and Plan   Flu [J11.1]  1. Flu  Covid/Flu- Office Collect Normal    oseltamivir (TAMIFLU) 75 mg capsule    ondansetron (ZOFRAN) 4 mg tablet    Covid/Flu- Office Collect Normal        Presumptive flu based on symptoms.  Patient is within treatment window.  Discussed risks benefits of medication and patient does wish to proceed.  In addition, supportive care for flu symptoms discussed.  Follow up with PCP if not improving and ED precautions discussed.      Patient Instructions       Follow up with PCP in 3-5 days.  Proceed to  ER if symptoms worsen.    If tests have been performed at [facility], our office will contact you with results if changes need to be made to the care plan discussed with you at the visit.  [facility]    Chief Complaint     Chief Complaint   Patient presents with   • Flu Symptoms     Started yesterday with sore throat laryngitis, vomited during the night h/a congestion, cough, temp 103         History of Present Illness       Flu Symptoms  The current episode started yesterday. The problem occurs constantly. The problem is unchanged. The pain is moderate. The symptoms are aggravated by activity, light and movement. Associated symptoms include congestion, headaches, rhinorrhea, a sore throat, a URI, fatigue, a fever, coughing and nausea. Pertinent negatives include no decreased vision, double vision, ear discharge, ear pain, eye discharge, eye itching, eye pain, eye redness, hearing loss, mouth sores, photophobia, stridor, swollen glands, shortness of breath or wheezing.       Review of Systems   Review of Systems   Constitutional:  Positive for fatigue and fever.   HENT:  Positive for congestion, rhinorrhea and sore throat. Negative for ear discharge, ear pain, hearing loss and mouth sores.     Eyes:  Negative for double vision, photophobia, pain, discharge, redness and itching.   Respiratory:  Positive for cough. Negative for shortness of breath, wheezing and stridor.    Gastrointestinal:  Positive for nausea.   Neurological:  Positive for headaches.         Current Medications       Current Outpatient Medications:   •  clobetasol (TEMOVATE) 0.05 % cream, Apply topically 2 (two) times a day as needed, Disp: 30 g, Rfl: 1  •  clonazePAM (KlonoPIN) 0.5 mg tablet, take 1 tablet by mouth three times a day if needed for anxiety, Disp: 90 tablet, Rfl: 0  •  diclofenac sodium (VOLTAREN) 1 %, Place on the skin, Disp: , Rfl:   •  Misc Natural Products (ELDERBERRY ZINC/VIT C/IMMUNE MT), Apply to the mouth or throat, Disp: , Rfl:   •  Omega-3 Fatty Acids (fish oil) 1,000 mg, Take 1,000 mg by mouth daily, Disp: , Rfl:   •  ondansetron (ZOFRAN) 4 mg tablet, Take 1 tablet (4 mg total) by mouth every 8 (eight) hours as needed for nausea or vomiting, Disp: 20 tablet, Rfl: 0  •  oseltamivir (TAMIFLU) 75 mg capsule, Take 1 capsule (75 mg total) by mouth every 12 (twelve) hours for 5 days, Disp: 10 capsule, Rfl: 0  •  sertraline (ZOLOFT) 100 mg tablet, Take 1 tablet (100 mg total) by mouth daily, Disp: 90 tablet, Rfl: 1  •  Tyrvaya 0.03 MG/ACT SOLN, , Disp: , Rfl:   •  verapamil (Verelan) 120 MG 24 hr capsule, Take 1 capsule (120 mg total) by mouth daily, Disp: 90 capsule, Rfl: 1  •  Calcium-Magnesium-Vitamin D (CALCIUM 1200+D3 PO), , Disp: , Rfl:   •  cholecalciferol (VITAMIN D3) 1,000 units tablet, Take 1 tablet (1,000 Units total) by mouth daily (Patient not taking: Reported on 2/25/2025), Disp: 30 tablet, Rfl: 0  •  docusate sodium (COLACE) 100 mg capsule, Take 1 capsule (100 mg total) by mouth 2 (two) times a day as needed for constipation (Patient not taking: Reported on 10/17/2023), Disp: 60 capsule, Rfl: 0  •  polyethylene glycol (MIRALAX) 17 g packet, Take 17 g by mouth daily (Patient not taking: Reported on  10/17/2023), Disp: 14 each, Rfl: 0  •  RESTASIS 0.05 % ophthalmic emulsion, Administer to both eyes every 12 (twelve) hours   (Patient not taking: Reported on 10/28/2024), Disp: , Rfl: 0    Current Allergies     Allergies as of 2025 - Reviewed 2025   Allergen Reaction Noted   • Bee venom Anaphylaxis 10/23/2006   • Meperidine GI Intolerance 2016   • Penicillins  2005            The following portions of the patient's history were reviewed and updated as appropriate: allergies, current medications, past family history, past medical history, past social history, past surgical history and problem list.     Past Medical History:   Diagnosis Date   • Anxiety    • Arthritis    • Degenerative disc disease, cervical     2 cervical and 2 lumbar, goes for spinal decompression therapy 2x monthly   • Depression    • History of herniated intervertebral disc 10/19/2005   • Hypertension        Past Surgical History:   Procedure Laterality Date   • APPENDECTOMY     •  SECTION     • FOOT SURGERY Left 1997    exostosectomy for spur   • HYSTERECTOMY     • KNEE SURGERY Right     arthroscopy   • IA OSTECTOMY CALCANEUS SPUR W/WO PLNTAR FASCIAL RLS Right 3/2/2023    Procedure: [place] resection, retrocalcaneal bursectomy, achilles debridement and repair.;  Surgeon: James R Lachman, MD;  Location: [facility] MAIN OR;  Service: Orthopedics   • TUBAL LIGATION     • VULVECTOMY         Family History   Problem Relation Age of Onset   • Diabetes Mother    • Heart disease Mother    • Arthritis Mother    • Anxiety disorder Mother    • Atrial fibrillation Father    • Arthritis Father    • Obesity Sister    • Breast cancer Maternal Grandmother 70   • No Known Problems Maternal Aunt    • No Known Problems Brother    • Colon cancer Neg Hx          Medications have been verified.        Objective   /73   Pulse (!) 115   Temp (!) 103 °F (39.4 °C) (Temporal)   Resp (!) 24   SpO2 99%   No LMP recorded. Patient has  had a hysterectomy.       Physical Exam     Physical Exam  Vitals reviewed.   Constitutional:       General: She is not in acute distress.     Appearance: Normal appearance. She is not ill-appearing, toxic-appearing or diaphoretic.   HENT:      Head: Normocephalic and atraumatic.      Right Ear: Tympanic membrane normal.      Left Ear: Tympanic membrane normal.      Nose: Congestion and rhinorrhea present.      Mouth/Throat:      Mouth: Mucous membranes are moist.      Pharynx: Posterior oropharyngeal erythema present. No oropharyngeal exudate.   Eyes:      Pupils: Pupils are equal, round, and reactive to light.   Cardiovascular:      Rate and Rhythm: Normal rate and regular rhythm.      Heart sounds: No murmur heard.  Pulmonary:      Effort: Pulmonary effort is normal. No respiratory distress.      Breath sounds: Normal breath sounds.   Abdominal:      General: Abdomen is flat.      Palpations: Abdomen is soft.      Tenderness: There is no abdominal tenderness.   Musculoskeletal:         General: Normal range of motion.      Cervical back: Normal range of motion. No rigidity or tenderness.   Lymphadenopathy:      Cervical: No cervical adenopathy.   Skin:     General: Skin is warm and dry.      Capillary Refill: Capillary refill takes less than 2 seconds.   Neurological:      General: No focal deficit present.      Mental Status: She is alert and oriented to person, place, and time. Mental status is at baseline.   Psychiatric:         Mood and Affect: Mood normal.         Behavior: Behavior normal.         Thought Content: Thought content normal.

## 2025-02-26 ENCOUNTER — RESULTS FOLLOW-UP (OUTPATIENT)
Dept: URGENT CARE | Facility: CLINIC | Age: 60
End: 2025-02-26

## 2025-02-26 LAB
FLUAV RNA RESP QL NAA+PROBE: POSITIVE
FLUBV RNA RESP QL NAA+PROBE: NEGATIVE
SARS-COV-2 RNA RESP QL NAA+PROBE: NEGATIVE

## 2025-02-28 DIAGNOSIS — F41.1 GENERALIZED ANXIETY DISORDER: ICD-10-CM

## 2025-02-28 RX ORDER — SERTRALINE HYDROCHLORIDE 100 MG/1
100 TABLET, FILM COATED ORAL DAILY
Qty: 90 TABLET | Refills: 1 | Status: SHIPPED | OUTPATIENT
Start: 2025-02-28

## 2025-04-29 ENCOUNTER — HOSPITAL ENCOUNTER (OUTPATIENT)
Dept: GASTROENTEROLOGY | Facility: HOSPITAL | Age: 60
Setting detail: OUTPATIENT SURGERY
Discharge: HOME | End: 2025-04-29
Attending: INTERNAL MEDICINE
Payer: COMMERCIAL

## 2025-04-29 ENCOUNTER — ANESTHESIA (OUTPATIENT)
Dept: GASTROENTEROLOGY | Facility: HOSPITAL | Age: 60
End: 2025-04-29
Payer: COMMERCIAL

## 2025-04-29 ENCOUNTER — ANESTHESIA EVENT (OUTPATIENT)
Dept: GASTROENTEROLOGY | Facility: HOSPITAL | Age: 60
End: 2025-04-29
Payer: COMMERCIAL

## 2025-04-29 VITALS
TEMPERATURE: 97.5 F | RESPIRATION RATE: 16 BRPM | SYSTOLIC BLOOD PRESSURE: 123 MMHG | HEART RATE: 92 BPM | OXYGEN SATURATION: 96 % | BODY MASS INDEX: 37.49 KG/M2 | HEIGHT: 65 IN | DIASTOLIC BLOOD PRESSURE: 83 MMHG | WEIGHT: 225 LBS

## 2025-04-29 DIAGNOSIS — Z83.719 FAMILY HISTORY OF COLONIC POLYPS: ICD-10-CM

## 2025-04-29 PROCEDURE — 45385 COLONOSCOPY W/LESION REMOVAL: CPT | Performed by: INTERNAL MEDICINE

## 2025-04-29 PROCEDURE — 88305 TISSUE EXAM BY PATHOLOGIST: CPT | Performed by: PATHOLOGY

## 2025-04-29 RX ORDER — SODIUM CHLORIDE, SODIUM LACTATE, POTASSIUM CHLORIDE, CALCIUM CHLORIDE 600; 310; 30; 20 MG/100ML; MG/100ML; MG/100ML; MG/100ML
INJECTION, SOLUTION INTRAVENOUS CONTINUOUS PRN
Status: DISCONTINUED | OUTPATIENT
Start: 2025-04-29 | End: 2025-04-29

## 2025-04-29 RX ORDER — PROPOFOL 10 MG/ML
INJECTION, EMULSION INTRAVENOUS AS NEEDED
Status: DISCONTINUED | OUTPATIENT
Start: 2025-04-29 | End: 2025-04-29

## 2025-04-29 RX ORDER — LIDOCAINE HYDROCHLORIDE 10 MG/ML
INJECTION, SOLUTION EPIDURAL; INFILTRATION; INTRACAUDAL; PERINEURAL AS NEEDED
Status: DISCONTINUED | OUTPATIENT
Start: 2025-04-29 | End: 2025-04-29

## 2025-04-29 RX ADMIN — PROPOFOL 120 MCG/KG/MIN: 10 INJECTION, EMULSION INTRAVENOUS at 11:01

## 2025-04-29 RX ADMIN — SODIUM CHLORIDE, SODIUM LACTATE, POTASSIUM CHLORIDE, AND CALCIUM CHLORIDE: .6; .31; .03; .02 INJECTION, SOLUTION INTRAVENOUS at 10:50

## 2025-04-29 RX ADMIN — PROPOFOL 100 MG: 10 INJECTION, EMULSION INTRAVENOUS at 11:00

## 2025-04-29 RX ADMIN — LIDOCAINE HYDROCHLORIDE 50 MG: 10 INJECTION, SOLUTION EPIDURAL; INFILTRATION; INTRACAUDAL; PERINEURAL at 11:00

## 2025-04-29 RX ADMIN — PROPOFOL 50 MG: 10 INJECTION, EMULSION INTRAVENOUS at 11:03

## 2025-04-29 NOTE — ANESTHESIA POSTPROCEDURE EVALUATION
Post-Op Assessment Note    CV Status:  Stable    Pain management: satisfactory to patient       Mental Status:  Awake and sleepy   Hydration Status:  Euvolemic and stable   PONV Controlled:  None   Airway Patency:  Patent     Post Op Vitals Reviewed: Yes    No anethesia notable event occurred.    Staff: Anesthesiologist, CRNA           Last Filed PACU Vitals:  Vitals Value Taken Time   Temp 97.5 °F (36.4 °C) 04/29/25 1120   Pulse 94 04/29/25 1120   BP     Resp 16 04/29/25 1120   SpO2 94 % 04/29/25 1120       Modified Ronna:     Vitals Value Taken Time   Activity 2 04/29/25 1121   Respiration 2 04/29/25 1121   Circulation 2 04/29/25 1121   Consciousness 1 04/29/25 1121   Oxygen Saturation 2 04/29/25 1121     Modified Ronna Score: 9

## 2025-04-29 NOTE — PERIOPERATIVE NURSING NOTE
Pt & daughter expressing understanding to discharge instructions, no complaints of pain. Pt tolerated snack uneventfully, IV site removed.  Pt getting dressed.

## 2025-04-29 NOTE — ANESTHESIA PREPROCEDURE EVALUATION
Procedure:  COLONOSCOPY    Relevant Problems   CARDIO   (+) Benign essential hypertension      GI/HEPATIC   (+) GERD (gastroesophageal reflux disease)      NEURO/PSYCH   (+) Depression   (+) Generalized anxiety disorder      Rheumatology   (+) Tendonitis, Achilles, right      Orthopedic/Musculoskeletal   (+) Closed nondisplaced avulsion fracture of tuberosity of right calcaneus   (+) Adrienne's deformity of right heel   (+) Radiculopathy of lumbar region      FEN/Gastrointestinal   (+) Irritable bowel syndrome      Other   (+) Obesity        Physical Exam    Airway    Mallampati score: II  TM Distance: >3 FB  Neck ROM: full     Dental   No notable dental hx     Cardiovascular      Pulmonary      Other Findings  post-pubertal.      Anesthesia Plan  ASA Score- 3     Anesthesia Type- IV sedation with anesthesia with ASA Monitors.         Additional Monitors:     Airway Plan:            Plan Factors-Exercise tolerance (METS): >4 METS.    Chart reviewed.    Patient summary reviewed.    Patient is not a current smoker.  Patient did not smoke on day of surgery.            Induction- intravenous.    Postoperative Plan-     Perioperative Resuscitation Plan - Level 1 - Full Code.       Informed Consent- Anesthetic plan and risks discussed with patient.  I personally reviewed this patient with the CRNA. Discussed and agreed on the Anesthesia Plan with the CRNA..      NPO Status:  No vitals data found for the desired time range.

## 2025-05-05 ENCOUNTER — RESULTS FOLLOW-UP (OUTPATIENT)
Dept: GASTROENTEROLOGY | Facility: CLINIC | Age: 60
End: 2025-05-05

## 2025-05-05 PROCEDURE — 88305 TISSUE EXAM BY PATHOLOGIST: CPT | Performed by: PATHOLOGY

## 2025-05-27 ENCOUNTER — TELEPHONE (OUTPATIENT)
Age: 60
End: 2025-05-27

## 2025-05-27 DIAGNOSIS — R73.01 IMPAIRED FASTING GLUCOSE: ICD-10-CM

## 2025-05-27 DIAGNOSIS — I10 BENIGN ESSENTIAL HYPERTENSION: Primary | ICD-10-CM

## 2025-05-27 NOTE — TELEPHONE ENCOUNTER
The patient called she has an appointment on  9/22/25  does she need to get blood work done   please advise thank you

## 2025-05-27 NOTE — TELEPHONE ENCOUNTER
Called and left message advising patient that she needs her bloodwork done and that her orders were sent to [facility].  HAM

## 2025-07-15 ENCOUNTER — OFFICE VISIT (OUTPATIENT)
Dept: OBGYN CLINIC | Facility: CLINIC | Age: 60
End: 2025-07-15
Payer: COMMERCIAL

## 2025-07-15 VITALS — BODY MASS INDEX: 39.11 KG/M2 | DIASTOLIC BLOOD PRESSURE: 74 MMHG | SYSTOLIC BLOOD PRESSURE: 110 MMHG | WEIGHT: 235 LBS

## 2025-07-15 DIAGNOSIS — Z78.0 MENOPAUSE: ICD-10-CM

## 2025-07-15 DIAGNOSIS — Z01.419 WELL WOMAN EXAM: Primary | ICD-10-CM

## 2025-07-15 DIAGNOSIS — N81.10 CYSTOCELE, UNSPECIFIED: ICD-10-CM

## 2025-07-15 DIAGNOSIS — N39.41 URGE INCONTINENCE: ICD-10-CM

## 2025-07-15 PROCEDURE — S0610 ANNUAL GYNECOLOGICAL EXAMINA: HCPCS | Performed by: PHYSICIAN ASSISTANT

## 2025-07-22 LAB
ALBUMIN SERPL-MCNC: 4.1 G/DL (ref 3.8–4.9)
ALP SERPL-CCNC: 91 IU/L (ref 44–121)
ALT SERPL-CCNC: 18 IU/L (ref 0–32)
AST SERPL-CCNC: 18 IU/L (ref 0–40)
BILIRUB SERPL-MCNC: 0.3 MG/DL (ref 0–1.2)
BUN SERPL-MCNC: 14 MG/DL (ref 8–27)
BUN/CREAT SERPL: 15 (ref 12–28)
CALCIUM SERPL-MCNC: 8.9 MG/DL (ref 8.7–10.3)
CHLORIDE SERPL-SCNC: 106 MMOL/L (ref 96–106)
CHOLEST SERPL-MCNC: 189 MG/DL (ref 100–199)
CO2 SERPL-SCNC: 21 MMOL/L (ref 20–29)
CREAT SERPL-MCNC: 0.91 MG/DL (ref 0.57–1)
EGFR: 72 ML/MIN/1.73
ERYTHROCYTE [DISTWIDTH] IN BLOOD BY AUTOMATED COUNT: 14.4 % (ref 11.7–15.4)
EST. AVERAGE GLUCOSE BLD GHB EST-MCNC: 111 MG/DL
GLOBULIN SER-MCNC: 2.6 G/DL (ref 1.5–4.5)
GLUCOSE SERPL-MCNC: 96 MG/DL (ref 70–99)
HBA1C MFR BLD: 5.5 % (ref 4.8–5.6)
HCT VFR BLD AUTO: 43 % (ref 34–46.6)
HDLC SERPL-MCNC: 51 MG/DL
HGB BLD-MCNC: 13.7 G/DL (ref 11.1–15.9)
LDLC SERPL CALC-MCNC: 111 MG/DL (ref 0–99)
LDLC/HDLC SERPL: 2.2 RATIO (ref 0–3.2)
MCH RBC QN AUTO: 27.6 PG (ref 26.6–33)
MCHC RBC AUTO-ENTMCNC: 31.9 G/DL (ref 31.5–35.7)
MCV RBC AUTO: 87 FL (ref 79–97)
MICRODELETION SYND BLD/T FISH: NORMAL
PLATELET # BLD AUTO: 316 X10E3/UL (ref 150–450)
POTASSIUM SERPL-SCNC: 4.6 MMOL/L (ref 3.5–5.2)
PROT SERPL-MCNC: 6.7 G/DL (ref 6–8.5)
RBC # BLD AUTO: 4.96 X10E6/UL (ref 3.77–5.28)
SL AMB VLDL CHOLESTEROL CALC: 27 MG/DL (ref 5–40)
SODIUM SERPL-SCNC: 143 MMOL/L (ref 134–144)
TRIGL SERPL-MCNC: 155 MG/DL (ref 0–149)
WBC # BLD AUTO: 7.8 X10E3/UL (ref 3.4–10.8)

## 2025-07-25 DIAGNOSIS — I10 BENIGN ESSENTIAL HYPERTENSION: ICD-10-CM

## 2025-07-28 RX ORDER — VERAPAMIL HYDROCHLORIDE 120 MG/1
120 CAPSULE, EXTENDED RELEASE ORAL DAILY
Qty: 90 CAPSULE | Refills: 0 | Status: SHIPPED | OUTPATIENT
Start: 2025-07-28

## (undated) DEVICE — GLOVE SRG BIOGEL 8

## (undated) DEVICE — DRESSING XEROFORM 5 X 9

## (undated) DEVICE — GLOVE INDICATOR PI UNDERGLOVE SZ 8 BLUE

## (undated) DEVICE — GAUZE SPONGES,16 PLY: Brand: CURITY

## (undated) DEVICE — ABDOMINAL PAD: Brand: DERMACEA

## (undated) DEVICE — DRAPE SHEET THREE QUARTER

## (undated) DEVICE — 6.5MM BIOZIP DRILL 4.0MM X 13.5MM: Brand: BIOZIP

## (undated) DEVICE — CAST PADDING 6IN UNSTERILE

## (undated) DEVICE — DISPOSABLE EQUIPMENT COVER: Brand: SMALL TOWEL DRAPE

## (undated) DEVICE — CUFF TOURNIQUET 30 X 4 IN QUICK CONNECT DISP 1BLA

## (undated) DEVICE — ACE WRAP 6 IN UNSTERILE

## (undated) DEVICE — PENCIL ELECTROSURG E-Z CLEAN -0035H

## (undated) DEVICE — SUT VICRYL 4-0 PS-2 18 IN J496G

## (undated) DEVICE — CHLORAPREP HI-LITE 26ML ORANGE

## (undated) DEVICE — MEDI-VAC YANKAUER SUCTION HANDLE W/STRAIGHT TIP & CONTROL VENT: Brand: CARDINAL HEALTH

## (undated) DEVICE — BRUSH EZ SCRUB PCMX W/NAIL CLEANER

## (undated) DEVICE — PADDING CAST 4 IN  COTTON STRL

## (undated) DEVICE — INTENDED FOR TISSUE SEPARATION, AND OTHER PROCEDURES THAT REQUIRE A SHARP SURGICAL BLADE TO PUNCTURE OR CUT.: Brand: BARD-PARKER ® CARBON RIB-BACK BLADES

## (undated) DEVICE — DRILL: Brand: SONICFUSION

## (undated) DEVICE — SUT VICRYL 2-0 CT-2 18 IN J726D

## (undated) DEVICE — BETHLEHEM UNIVERSAL  MIONR EXT: Brand: CARDINAL HEALTH

## (undated) DEVICE — SPLINT 5 X 30 IN FAST SET PLASTER

## (undated) DEVICE — BANDAGE, ESMARK LF STR 6"X9' (20/CS): Brand: CYPRESS

## (undated) DEVICE — SUT ETHILON 3-0 PS-1 18 IN 1663G

## (undated) DEVICE — SUT VICRYL 0 CT-1 27 IN J260H

## (undated) DEVICE — 3M™ DURAPORE™ SURGICAL TAPE 1538-1, 1 INCH X 10 YARD (2,5CM X 9,1M), 12 ROLLS/BOX: Brand: 3M™ DURAPORE™

## (undated) DEVICE — GLOVE SRG BIOGEL 7.5